# Patient Record
Sex: MALE | Race: WHITE | Employment: OTHER | URBAN - METROPOLITAN AREA
[De-identification: names, ages, dates, MRNs, and addresses within clinical notes are randomized per-mention and may not be internally consistent; named-entity substitution may affect disease eponyms.]

---

## 2017-02-24 DIAGNOSIS — R06.02 SHORTNESS OF BREATH: ICD-10-CM

## 2017-02-24 RX ORDER — FUROSEMIDE 40 MG
40 TABLET ORAL DAILY
Qty: 90 TABLET | Refills: 1 | Status: SHIPPED | OUTPATIENT
Start: 2017-02-24 | End: 2019-09-18

## 2017-03-01 ENCOUNTER — ALLIED HEALTH/NURSE VISIT (OUTPATIENT)
Dept: CARDIOLOGY | Facility: CLINIC | Age: 82
End: 2017-03-01
Payer: COMMERCIAL

## 2017-03-01 DIAGNOSIS — Z95.0 CARDIAC PACEMAKER IN SITU: Primary | ICD-10-CM

## 2017-03-01 PROCEDURE — 93280 PM DEVICE PROGR EVAL DUAL: CPT | Performed by: INTERNAL MEDICINE

## 2017-03-01 NOTE — MR AVS SNAPSHOT
"              After Visit Summary   3/1/2017    Jose Rucker    MRN: [id number]           Patient Information     Date Of Birth          2/4/1931        Visit Information        Provider Department      3/1/2017 10:50 AM RU MIGUELN [facility]        Today's Diagnoses     Cardiac pacemaker in situ    -  1       Follow-ups after your visit        Your next 10 appointments already scheduled     Jun 05, 2017 11:15 AM CDT   Remote PPM Check with NELSON TECH1   [facility] ([facility])    [address] [place] [address] [phone]           This appointment is for a remote check of your pacemaker.  This is not an appointment at the office.              Who to contact     If you have questions or need follow up information about today's clinic visit or your schedule please contact [facility] directly at [phone].  Normal or non-critical lab and imaging results will be communicated to you by Swoopohart, letter or phone within 4 business days after the clinic has received the results. If you do not hear from us within 7 days, please contact the clinic through MyChart or phone. If you have a critical or abnormal lab result, we will notify you by phone as soon as possible.  Submit refill requests through Grafighters or call your pharmacy and they will forward the refill request to us. Please allow 3 business days for your refill to be completed.          Additional Information About Your Visit        Swoopohart Information     Grafighters lets you send messages to your doctor, view your test results, renew your prescriptions, schedule appointments and more. To sign up, go to [email or web address] . Click on \"Log in\" on the left side of the screen, which will take you to the Welcome page. Then click on \"Sign up Now\" on the right side of the page.     You will be asked to enter " the access code listed below, as well as some personal information. Please follow the directions to create your username and password.     Your access code is: [email or web address]  Expires: 2017 11:24 AM     Your access code will  in 90 days. If you need help or a new code, please call your [place] clinic or [phone].        Care EveryWhere ID     This is your Care EveryWhere ID. This could be used by other organizations to access your [place] medical records  [id number]         Blood Pressure from Last 3 Encounters:   16 130/72   02/10/16 135/62   16 120/70    Weight from Last 3 Encounters:   16 83.9 kg (185 lb)   02/10/16 85.8 kg (189 lb 3.2 oz)   16 86.6 kg (191 lb)              We Performed the Following     PM DEVICE PROGRAMMING EVAL, DUAL LEAD PACER (58415)        Primary Care Provider Office Phone # Fax #    Octaviano Simmons [phone]       [facility] [address] [place] [address]        Thank you!     Thank you for choosing [facility] [place]  for your care. Our goal is always to provide you with excellent care. Hearing back from our patients is one way we can continue to improve our services. Please take a few minutes to complete the written survey that you may receive in the mail after your visit with us. Thank you!             Your Updated Medication List - Protect others around you: Learn how to safely use, store and throw away your medicines at www.disposemymeds.org.          This list is accurate as of: 3/1/17 11:24 AM.  Always use your most recent med list.                   Brand Name Dispense Instructions for use    apixaban ANTICOAGULANT 5 MG tablet    ELIQUIS    180 tablet    Take 1 tablet (5 mg) by mouth 2 times daily       furosemide 40 MG tablet    LASIX    90 tablet    Take 1 tablet (40 mg) by mouth daily       lisinopril 10 MG tablet    PRINIVIL/ZESTRIL    90 tablet    Take 1  tablet (10 mg) by mouth daily       metoprolol 50 MG 24 hr tablet    TOPROL-XL    135 tablet    Take 1 tab every am and 1/2 tab every pm (total of 75 mg daily)       PROSTEON PO      Take 500 mg by mouth 2 tablets twice daily       simvastatin 20 MG tablet    ZOCOR     Take 20 mg by mouth At Bedtime       TYLENOL PM EXTRA STRENGTH PO      Take 500 mg by mouth daily

## 2017-03-01 NOTE — PROGRESS NOTES
Medtronic Adapta (D) Pacemaker Device Check  AP: 96 % : 2.4 %  Mode: AAIR-DDDR  bpm        Underlying Rhythm: SB  Heart Rate: Heart rate stable with good variability.  Sensing: WNL    Pacing Threshold: WNL   Impedance: WNL  Battery Status: Approximately 13 years longevity.  Atrial Arrhythmia: Since last reset 2/17/16, there has been 18 mode switches comprising of <0.1 % of the time, stored EGMS showed a PAF with a controlled ventricular rate, longest lasting 1 hour 35 minutes. Patient is on Eliquis.  Ventricular Arrhythmia: 0  Setting Change: 0    Care Plan: Follow up with Q 3 month remote checks. MARY Vieira RN

## 2017-05-01 DIAGNOSIS — N40.0 BENIGN ENLARGEMENT OF PROSTATE: Primary | ICD-10-CM

## 2017-06-05 ENCOUNTER — ALLIED HEALTH/NURSE VISIT (OUTPATIENT)
Dept: CARDIOLOGY | Facility: CLINIC | Age: 82
End: 2017-06-05
Payer: COMMERCIAL

## 2017-06-05 DIAGNOSIS — Z95.0 CARDIAC PACEMAKER IN SITU: Primary | ICD-10-CM

## 2017-06-05 PROCEDURE — 93296 REM INTERROG EVL PM/IDS: CPT | Performed by: INTERNAL MEDICINE

## 2017-06-05 PROCEDURE — 93294 REM INTERROG EVL PM/LDLS PM: CPT | Performed by: INTERNAL MEDICINE

## 2017-06-05 NOTE — MR AVS SNAPSHOT
After Visit Summary   6/5/2017    Jose Rucker    MRN: [id number]           Patient Information     Date Of Birth          2/4/1931        Visit Information        Provider Department      6/5/2017 11:15 AM NELSON TECH1 [facility]        Today's Diagnoses     Cardiac pacemaker in situ    -  1       Follow-ups after your visit        Your next 10 appointments already scheduled     Jun 05, 2017 11:15 AM CDT   Remote PPM Check with NELSON TECH1   [facility] ([facility])    [address]  [facility] [id number] [phone]           This appointment is for a remote check of your pacemaker.  This is not an appointment at the office.            Jun 28, 2017 10:00 AM CDT   Return Visit with Boone Huerta MD   [facility] [place] ([facility])    [address]  [address]  [facility] [address] [phone]            Aug 01, 2017 11:15 AM CDT   Return Visit with Mode Ya MD   [facility] ([facility])    [address]  [facility] [address] [phone]              Who to contact     If you have questions or need follow up information about today's clinic visit or your schedule please contact [facility] directly at [phone].  Normal or non-critical lab and imaging results will be communicated to you by MyChart, letter or phone within 4 business days after the clinic has received the results. If you do not hear from us within 7 days, please contact the clinic through MyChart or phone. If you have a critical or abnormal lab result, we will notify you by phone as soon as possible.  Submit refill requests through [employer] or call your pharmacy and they will forward the refill request to us. Please allow  "3 business days for your refill to be completed.          Additional Information About Your Visit        MyChart Information     Wasabi Productionshart lets you send messages to your doctor, view your test results, renew your prescriptions, schedule appointments and more. To sign up, go to [email or web address] . Click on \"Log in\" on the left side of the screen, which will take you to the Welcome page. Then click on \"Sign up Now\" on the right side of the page.     You will be asked to enter the access code listed below, as well as some personal information. Please follow the directions to create your username and password.     Your access code is: [email or web address]  Expires: 9/3/2017 11:12 AM     Your access code will  in 90 days. If you need help or a new code, please call your [place] clinic or [phone].        Care EveryWhere ID     This is your Care EveryWhere ID. This could be used by other organizations to access your [place] medical records  [id number]         Blood Pressure from Last 3 Encounters:   16 130/72   02/10/16 135/62   16 120/70    Weight from Last 3 Encounters:   16 83.9 kg (185 lb)   02/10/16 85.8 kg (189 lb 3.2 oz)   16 86.6 kg (191 lb)              We Performed the Following     INTERROGATION DEVICE EVAL REMOTE, PACER/ICD (19109)     PM DEVICE INTERROGATE REMOTE (77850)        Primary Care Provider Office Phone # Fax #    Octaviano Simmons [phone]       [facility] [address] [place] [address]        Thank you!     Thank you for choosing [facility] [place]  for your care. Our goal is always to provide you with excellent care. Hearing back from our patients is one way we can continue to improve our services. Please take a few minutes to complete the written survey that you may receive in the mail after your visit with us. Thank you!             Your Updated Medication List - Protect others " around you: Learn how to safely use, store and throw away your medicines at www.disposemymeds.org.          This list is accurate as of: 6/5/17 11:12 AM.  Always use your most recent med list.                   Brand Name Dispense Instructions for use    apixaban ANTICOAGULANT 5 MG tablet    ELIQUIS    180 tablet    Take 1 tablet (5 mg) by mouth 2 times daily       furosemide 40 MG tablet    LASIX    90 tablet    Take 1 tablet (40 mg) by mouth daily       lisinopril 10 MG tablet    PRINIVIL/ZESTRIL    90 tablet    Take 1 tablet (10 mg) by mouth daily       metoprolol 50 MG 24 hr tablet    TOPROL-XL    135 tablet    Take 1 tab every am and 1/2 tab every pm (total of 75 mg daily)       PROSTEON PO      Take 500 mg by mouth 2 tablets twice daily       simvastatin 20 MG tablet    ZOCOR     Take 20 mg by mouth At Bedtime       TYLENOL PM EXTRA STRENGTH PO      Take 500 mg by mouth daily

## 2017-06-23 ENCOUNTER — HOSPITAL ENCOUNTER (OUTPATIENT)
Dept: LAB | Facility: CLINIC | Age: 82
Discharge: HOME | End: 2017-06-23
Attending: UROLOGY | Admitting: UROLOGY
Payer: MEDICARE

## 2017-06-23 DIAGNOSIS — N40.0 BENIGN ENLARGEMENT OF PROSTATE: ICD-10-CM

## 2017-06-23 LAB — PSA SERPL-MCNC: NORMAL UG/L (ref 0–4)

## 2017-06-23 PROCEDURE — 84153 ASSAY OF PSA TOTAL: CPT | Performed by: UROLOGY

## 2017-06-23 PROCEDURE — 36415 COLL VENOUS BLD VENIPUNCTURE: CPT | Performed by: UROLOGY

## 2017-06-28 ENCOUNTER — OFFICE VISIT (OUTPATIENT)
Dept: UROLOGY | Facility: CLINIC | Age: 82
End: 2017-06-28
Payer: COMMERCIAL

## 2017-06-28 VITALS — BODY MASS INDEX: 25.48 KG/M2 | WEIGHT: 178 LBS | HEIGHT: 70 IN | OXYGEN SATURATION: 97 % | HEART RATE: 90 BPM

## 2017-06-28 DIAGNOSIS — Z85.46 PERSONAL HISTORY OF MALIGNANT NEOPLASM OF PROSTATE: Primary | ICD-10-CM

## 2017-06-28 PROCEDURE — 51798 US URINE CAPACITY MEASURE: CPT | Performed by: UROLOGY

## 2017-06-28 PROCEDURE — 99212 OFFICE O/P EST SF 10 MIN: CPT | Mod: 25 | Performed by: UROLOGY

## 2017-06-28 ASSESSMENT — PAIN SCALES - GENERAL: PAINLEVEL: NO PAIN (0)

## 2017-06-28 NOTE — LETTER
6/28/2017       RE: Jose LIDIA [place]  [address] [place] [address]     Dear Colleague,    Thank you for referring your patient, Jose Rucker, [facility]. Please see a copy of my visit note below.    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 Hensley. His last hormone injection was in June 2010. He has done very well since his treatment with an undetectable PSA. istory is a remains undetectable last week. He has no urinary symptoms or complaints      PAST MEDICAL HISTORY:   Past Medical History:   Diagnosis Date     A-fib (H)      Bradycardia     PPM 5/27/2008     Hernia, abdominal      HTN (hypertension)      Palpitations      Sick sinus syndrome (H)      Syncope        PAST SURGICAL HISTORY:   Past Surgical History:   Procedure Laterality Date     CYSTOSCOPY       EP PPM GENERATOR CHANGE DUAL  2/10/16     HERNIA REPAIR       IMPLANT PACEMAKER  5/7/08    Medtronic Sensia, model# SEDR01, serial# [id number]     PROSTATE SURGERY         FAMILY HISTORY:   Family History   Problem Relation Age of Onset     HEART DISEASE Mother        SOCIAL HISTORY:   Social History   Substance Use Topics     Smoking status: Former Smoker     Smokeless tobacco: Never Used      Comment: quit about 60 years ago     Alcohol use Yes      Comment: 2 beers daily       Current Outpatient Prescriptions   Medication     furosemide (LASIX) 40 MG tablet     lisinopril (PRINIVIL/ZESTRIL) 10 MG tablet     apixaban ANTICOAGULANT (ELIQUIS) 5 MG tablet     metoprolol (TOPROL-XL) 50 MG 24 hr tablet     Multiple  "Minerals-Vitamins (PROSTEON PO)     simvastatin (ZOCOR) 20 MG tablet     Diphenhydramine-APAP, sleep, (TYLENOL PM EXTRA STRENGTH PO)     No current facility-administered medications for this visit.          PHYSICAL EXAM:    Pulse 90  Ht 1.778 m (5' 10\")  Wt 80.7 kg (178 lb)  SpO2 97%  BMI 25.54 kg/m2    HEENT: Normocephalic and atraumatic   Cardiac: Not done  Back/Flank: Not done  CNS/PNS: Not done  Respiratory: Normal non-labored breathing  Abdomen: Soft nontender and nondistended  Peripheral Vascular: Not done  Mental Status: Not done    Penis: Not done  Scrotal Skin: Not done  Testicles: Not done  Epididymis: Not done  Digital Rectal Exam:     Cystoscopy: Not done    Imaging: None    Urinalysis: UA RESULTS:  No results for input(s): COLOR, APPEARANCE, URINEGLC, URINEBILI, URINEKETONE, SG, UBLD, URINEPH, PROTEIN, UROBILINOGEN, NITRITE, LEUKEST, RBCU, WBCU in the last 60834 hours.    PSA: undetectable    Post Void Residual: 182 in bladder without voiding prior    Other labs: None today      IMPRESSION:  Doing well, PSA undetectable    PLAN:  I recommended that he continue to have PSA checked annually to make certain it remains undetectable. We will see him back next year.    Total Time: 10 minutes                                      Total in Consultation: 10 minutes  Boone Huerta M.D.            "

## 2017-06-28 NOTE — MR AVS SNAPSHOT
After Visit Summary   6/28/2017    Jose Rucker    MRN: [id number]           Patient Information     Date Of Birth          2/4/1931        Visit Information        Provider Department      6/28/2017 10:00 AM Boone Huerta MD [facility] [place]        Today's Diagnoses     Personal history of malignant neoplasm of prostate    -  1       Follow-ups after your visit        Follow-up notes from your care team     Return in about 1 year (around 6/28/2018) for See PA in 1 year for PSA.      Your next 10 appointments already scheduled     Aug 01, 2017 11:15 AM CDT   Return Visit with Mode Ya MD   [facility] [place] ([facility])    [address]  [facility] [id number] [phone]            Sep 11, 2017  9:45 AM CDT   Remote PPM Check with NELSON TECH1   [facility] [place] ([facility])    [address]  [facility] [id number] [phone]           This appointment is for a remote check of your pacemaker.  This is not an appointment at the office.              Future tests that were ordered for you today     Open Future Orders        Priority Expected Expires Ordered    PSA tumor marker Routine  6/28/2018 6/28/2017            Who to contact     If you have questions or need follow up information about today's clinic visit or your schedule please contact [facility] [place] directly at [phone].  Normal or non-critical lab and imaging results will be communicated to you by MyChart, letter or phone within 4 business days after the clinic has received the results. If you do not hear from us within 7 days, please contact the clinic through MyChart or phone. If you have a critical or abnormal lab result, we will notify you by phone as soon as possible.  Submit refill requests through  "MyChart or call your pharmacy and they will forward the refill request to us. Please allow 3 business days for your refill to be completed.          Additional Information About Your Visit        MyChart Information     Alereonhart lets you send messages to your doctor, view your test results, renew your prescriptions, schedule appointments and more. To sign up, go to [email or web address] . Click on \"Log in\" on the left side of the screen, which will take you to the Welcome page. Then click on \"Sign up Now\" on the right side of the page.     You will be asked to enter the access code listed below, as well as some personal information. Please follow the directions to create your username and password.     Your access code is: [email or web address]  Expires: 9/3/2017 11:12 AM     Your access code will  in 90 days. If you need help or a new code, please call your [place] clinic or [phone].        Care EveryWhere ID     This is your Care EveryWhere ID. This could be used by other organizations to access your [place] medical records  [id number]        Your Vitals Were     Pulse Height Pulse Oximetry BMI (Body Mass Index)          90 1.778 m (5' 10\") 97% 25.54 kg/m2         Blood Pressure from Last 3 Encounters:   16 130/72   02/10/16 135/62   16 120/70    Weight from Last 3 Encounters:   17 80.7 kg (178 lb)   16 83.9 kg (185 lb)   02/10/16 85.8 kg (189 lb 3.2 oz)               Primary Care Provider Office Phone # Fax #    Octaviano Simmons [phone]       [facility] [address] [place] [address]        Equal Access to Services     BRITTNEY QUEEN : Hailee Pope, wakerrie robles, qaybta kaalabdoul rosenthal, paul pérez. So [facility] [phone].    ATENCIÓN: Si habla español, tiene a varela disposición servicios gratuitos de asistencia lingüística. Eve al [phone].    We comply with applicable federal civil " rights laws and [place] laws. We do not discriminate on the basis of race, color, national origin, age, disability sex, sexual orientation or gender identity.            Thank you!     Thank you for choosing [facility] [place]  for your care. Our goal is always to provide you with excellent care. Hearing back from our patients is one way we can continue to improve our services. Please take a few minutes to complete the written survey that you may receive in the mail after your visit with us. Thank you!             Your Updated Medication List - Protect others around you: Learn how to safely use, store and throw away your medicines at www.disposemymeds.org.          This list is accurate as of: 6/28/17 10:19 AM.  Always use your most recent med list.                   Brand Name Dispense Instructions for use Diagnosis    apixaban ANTICOAGULANT 5 MG tablet    ELIQUIS    180 tablet    Take 1 tablet (5 mg) by mouth 2 times daily    A-fib (H)       furosemide 40 MG tablet    LASIX    90 tablet    Take 1 tablet (40 mg) by mouth daily    Shortness of breath       lisinopril 10 MG tablet    PRINIVIL/ZESTRIL    90 tablet    Take 1 tablet (10 mg) by mouth daily    Essential hypertension, benign       metoprolol 50 MG 24 hr tablet    TOPROL-XL    135 tablet    Take 1 tab every am and 1/2 tab every pm (total of 75 mg daily)    Paroxysmal atrial fibrillation (H)       PROSTEON PO      Take 500 mg by mouth 2 tablets twice daily        simvastatin 20 MG tablet    ZOCOR     Take 20 mg by mouth At Bedtime        TYLENOL PM EXTRA STRENGTH PO      Take 500 mg by mouth daily

## 2017-06-28 NOTE — PROGRESS NOTES
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 Hensley. His last hormone injection was in June 2010. He has done very well since his treatment with an undetectable PSA. istory is a remains undetectable last week. He has no urinary symptoms or complaints      PAST MEDICAL HISTORY:   Past Medical History:   Diagnosis Date     A-fib (H)      Bradycardia     PPM 5/27/2008     Hernia, abdominal      HTN (hypertension)      Palpitations      Sick sinus syndrome (H)      Syncope        PAST SURGICAL HISTORY:   Past Surgical History:   Procedure Laterality Date     CYSTOSCOPY       EP PPM GENERATOR CHANGE DUAL  2/10/16     HERNIA REPAIR       IMPLANT PACEMAKER  5/7/08    Medtronic Sensia, model# SEDR01, serial# [id number]     PROSTATE SURGERY         FAMILY HISTORY:   Family History   Problem Relation Age of Onset     HEART DISEASE Mother        SOCIAL HISTORY:   Social History   Substance Use Topics     Smoking status: Former Smoker     Smokeless tobacco: Never Used      Comment: quit about 60 years ago     Alcohol use Yes      Comment: 2 beers daily       Current Outpatient Prescriptions   Medication     furosemide (LASIX) 40 MG tablet     lisinopril (PRINIVIL/ZESTRIL) 10 MG tablet     apixaban ANTICOAGULANT (ELIQUIS) 5 MG tablet     metoprolol (TOPROL-XL) 50 MG 24 hr tablet     Multiple Minerals-Vitamins (PROSTEON PO)     simvastatin (ZOCOR) 20 MG tablet     Diphenhydramine-APAP, sleep, (TYLENOL PM EXTRA STRENGTH PO)     No current facility-administered medications for this visit.          PHYSICAL EXAM:    Pulse 90  Ht 1.778 m (5' 10\")  Wt 80.7 kg (178 lb)  SpO2 97%  BMI 25.54 kg/m2    HEENT: Normocephalic and " atraumatic   Cardiac: Not done  Back/Flank: Not done  CNS/PNS: Not done  Respiratory: Normal non-labored breathing  Abdomen: Soft nontender and nondistended  Peripheral Vascular: Not done  Mental Status: Not done    Penis: Not done  Scrotal Skin: Not done  Testicles: Not done  Epididymis: Not done  Digital Rectal Exam:     Cystoscopy: Not done    Imaging: None    Urinalysis: UA RESULTS:  No results for input(s): COLOR, APPEARANCE, URINEGLC, URINEBILI, URINEKETONE, SG, UBLD, URINEPH, PROTEIN, UROBILINOGEN, NITRITE, LEUKEST, RBCU, WBCU in the last 53552 hours.    PSA: undetectable    Post Void Residual: 182 in bladder without voiding prior    Other labs: None today      IMPRESSION:  Doing well, PSA undetectable    PLAN:  I recommended that he continue to have PSA checked annually to make certain it remains undetectable. We will see him back next year.    Total Time: 10 minutes                                      Total in Consultation: 10 minutes      Boone Huerta M.D.

## 2017-08-01 ENCOUNTER — OFFICE VISIT (OUTPATIENT)
Dept: CARDIOLOGY | Facility: CLINIC | Age: 82
End: 2017-08-01
Attending: INTERNAL MEDICINE
Payer: COMMERCIAL

## 2017-08-01 VITALS
HEIGHT: 70 IN | WEIGHT: 182 LBS | DIASTOLIC BLOOD PRESSURE: 70 MMHG | HEART RATE: 66 BPM | OXYGEN SATURATION: 95 % | BODY MASS INDEX: 26.05 KG/M2 | SYSTOLIC BLOOD PRESSURE: 132 MMHG

## 2017-08-01 DIAGNOSIS — I42.9 CARDIOMYOPATHY, UNSPECIFIED (H): ICD-10-CM

## 2017-08-01 DIAGNOSIS — Z95.0 CARDIAC PACEMAKER IN SITU: Primary | ICD-10-CM

## 2017-08-01 DIAGNOSIS — I48.0 PAROXYSMAL ATRIAL FIBRILLATION (H): ICD-10-CM

## 2017-08-01 DIAGNOSIS — I50.22 CHRONIC SYSTOLIC CONGESTIVE HEART FAILURE (H): ICD-10-CM

## 2017-08-01 DIAGNOSIS — I49.5 SSS (SICK SINUS SYNDROME) (H): ICD-10-CM

## 2017-08-01 PROCEDURE — 99214 OFFICE O/P EST MOD 30 MIN: CPT | Performed by: INTERNAL MEDICINE

## 2017-08-01 NOTE — PROGRESS NOTES
HPI and Plan:   See dictation    Orders Placed This Encounter   Procedures     Follow-Up with Cardiologist     EKG 12-lead complete w/read - Clinics (to be scheduled)     Echocardiogram       No orders of the defined types were placed in this encounter.      There are no discontinued medications.      Encounter Diagnoses   Name Primary?     SSS (sick sinus syndrome) (H)      Cardiac pacemaker in situ Yes     Paroxysmal atrial fibrillation (H)      Chronic systolic congestive heart failure (H)      Cardiomyopathy, unspecified (H)        CURRENT MEDICATIONS:  Current Outpatient Prescriptions   Medication Sig Dispense Refill     furosemide (LASIX) 40 MG tablet Take 1 tablet (40 mg) by mouth daily 90 tablet 1     lisinopril (PRINIVIL/ZESTRIL) 10 MG tablet Take 1 tablet (10 mg) by mouth daily 90 tablet 2     apixaban ANTICOAGULANT (ELIQUIS) 5 MG tablet Take 1 tablet (5 mg) by mouth 2 times daily 180 tablet 3     metoprolol (TOPROL-XL) 50 MG 24 hr tablet Take 1 tab every am and 1/2 tab every pm (total of 75 mg daily) 135 tablet 3     Multiple Minerals-Vitamins (PROSTEON PO) Take 500 mg by mouth 2 tablets twice daily       simvastatin (ZOCOR) 20 MG tablet Take 20 mg by mouth At Bedtime       Diphenhydramine-APAP, sleep, (TYLENOL PM EXTRA STRENGTH PO) Take 500 mg by mouth daily         ALLERGIES     Allergies   Allergen Reactions     Penicillin G Rash     And patient had an awful smell        PAST MEDICAL HISTORY:  Past Medical History:   Diagnosis Date     A-fib (H)      Bradycardia     PPM 5/27/2008     Hernia, abdominal      HTN (hypertension)      Palpitations      Sick sinus syndrome (H)      Syncope        PAST SURGICAL HISTORY:  Past Surgical History:   Procedure Laterality Date     CYSTOSCOPY       EP PPM GENERATOR CHANGE DUAL  2/10/16     HERNIA REPAIR       IMPLANT PACEMAKER  5/7/08    Medtronic Sensia, model# SEDR01, serial# [id number]     PROSTATE SURGERY         FAMILY HISTORY:  Family History   Problem  "Relation Age of Onset     HEART DISEASE Mother        SOCIAL HISTORY:  Social History     Social History     Marital status:      Spouse name: N/A     Number of children: N/A     Years of education: N/A     Social History Main Topics     Smoking status: Former Smoker     Smokeless tobacco: Never Used      Comment: quit about 60 years ago     Alcohol use Yes      Comment: 2 beers daily     Drug use: None     Sexual activity: Not Asked     Other Topics Concern     Caffeine Concern No     2 cups daily     Special Diet No     Exercise No     some stairs, yardwork, uses exercise ball     Social History Narrative       Review of Systems:  Skin:  Positive for   precancer on skin seeing derm    Eyes:  Positive for glasses    ENT:  Positive for postnasal drainage;hearing loss    Respiratory:  Positive for wheezing once in a while more so when laying head on a pillow    Cardiovascular:  Negative      Gastroenterology: Negative      Genitourinary:  Positive for urinary frequency hx of prostate cancer  Musculoskeletal:  Positive for arthritis;neck pain    Neurologic:  Positive for numbness or tingling of hands;numbness or tingling of feet hands and feet   Psychiatric:  Negative      Heme/Lymph/Imm:  Positive for easy bruising    Endocrine:  Negative        Physical Exam:  Vitals: /70 (BP Location: Right arm, Patient Position: Sitting, Cuff Size: Adult Regular)  Pulse 66  Ht 1.778 m (5' 10\")  Wt 82.6 kg (182 lb)  SpO2 95%  BMI 26.11 kg/m2    Constitutional:  cooperative, alert and oriented, well developed, well nourished, in no acute distress;cooperative overweight      Skin:  warm and dry to the touch, no apparent skin lesions or masses noted        Head:  normocephalic, no masses or lesions        Eyes:  pupils equal and round, conjunctivae and lids unremarkable, sclera white, no xanthalasma, EOMS intact, no nystagmus        ENT:  no pallor or cyanosis, dentition good        Neck:  carotid pulses are full and " equal bilaterally, JVP normal, no carotid bruit, no thyromegaly        Chest:  normal breath sounds, clear to auscultation, normal A-P diameter, normal symmetry, normal respiratory excursion, no use of accessory muscles  (Occassional coarse crackles at right base.) pacemaker incision in the left infraclavicular area was well-healed      Cardiac: regular rhythm, normal S1/S2, no S3 or S4, apical impulse not displaced, no murmurs, gallops or rubs                  Abdomen:  abdomen soft, non-tender, BS normoactive, no mass, no HSM, no bruits        Vascular: pulses full and equal, no bruits auscultated;pulses full and equal                                        Extremities and Back:  no deformities, clubbing, cyanosis, erythema observed;no edema stasis pigmentation            Neurological:  affect appropriate, oriented to time, person and place;no gross motor deficits              CC  Saida Rivas MD   [facility]  [address]  [place] [id number]

## 2017-08-01 NOTE — LETTER
2017    Octaviano Simmons MD  [facility]   [address] [place] [address]    RE: Jose Rucker       Dear Colleague,    I had the pleasure of seeing Jose Rucker in the [facility].    2017           Octaviano Simmons MD   [facility] [address] [place] [address]      RE: Jose Rucker   MRN: [id number]   : 1931           Dear Dr. Simmons:      It is my pleasure to see your patient Jose Rucker, who is a nice, 84-year-old gentleman previously seen by my partner, Dr. Radames Jewell, for a mild cardiomyopathy when his ejection fraction was felt to be in the 40%-45%.  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  The patient also has a history of sick sinus syndrome and paroxysmal atrial fibrillation.  The patient has a dual-chamber permanent pacemaker in situ for sick sinus syndrome.  The patient was noted to have short episodes of atrial fibrillation with a low AF burden.  He is anticoagulated with eliquis, and he is on metoprolol 50 mg once in the morning and 1/2 tablet at nighttime (total of 75 mg per day).   If you remember when I saw him in  he was complaining of shortness of breath. However, the EKG showed that his permanent pacemaker was at end-of-life mode. This means that the pacemaker is in a fixed heart rate and paces only in the ventricles with no rate responsiveness. So one loses the atrial involvement in the cardiac cycle and the ventricular rate does not increase with exercise. He had his generator replaced by Dr. Rivas and he symptoms went away. He still feels very well with no shortness of breath and is able to carry out his activities of daily living without limitation. Interrogation of these permanent pacemaker indicated that he had 8 V switches the lungs episode was only 1 minute and  7 seconds ~ventricular rate was on the higher side with that mode switch. Typically, he's ventricular rate has been well-controlled. He is asymptomatic when he's in atrial fibrillation. He is anticoagulated with eliquis and has no bleeding issues. In the past he has a history of cardiomyopathy. His most recent echocardiogram was a year ago showing that he had normal ejection fraction in the 55 to 60% range.       IMPRESSION:     1.  Atrial fibrillation. He's atrial fibrillation burden is low. The episodes are short-lived and he is asymptomatic. He is having no bleeding issues with his anticoagulation. 2. Permanent pacemaker. Since the replacement of his generator he symptoms of shortness of breath have disappeared for the reasons I mentioned above.  3. Cardiomyopathy. He's ejection fraction a year ago was normal. He does not of symptoms of congestive heart failure.       PLAN:   I will continue the patient on his present medications. I will have to follow-up with me in one years time and at that time we would repeat his echocardiogram to follow the cardiomyopathy.     It has been a pleasure to be involved in the care of this extremely nice patient. As always, he has been told to contact us if he has any questions or any concerns.     Sincerely,      Mode Ya MD, [facility]                         Name:     IDANIA BEDOLLA   MRN:      [id number]        Account:      [id number]   :      1931           Service Date: 2017         HPI and Plan:   See dictation    Orders Placed This Encounter   Procedures     Follow-Up with Cardiologist     EKG 12-lead complete w/read - Clinics (to be scheduled)     Echocardiogram       No orders of the defined types were placed in this encounter.      There are no discontinued medications.      Encounter Diagnoses   Name Primary?     SSS (sick sinus syndrome) (H)      Cardiac pacemaker in situ Yes     Paroxysmal atrial fibrillation (H)      Chronic systolic  congestive heart failure (H)      Cardiomyopathy, unspecified (H)        CURRENT MEDICATIONS:  Current Outpatient Prescriptions   Medication Sig Dispense Refill     furosemide (LASIX) 40 MG tablet Take 1 tablet (40 mg) by mouth daily 90 tablet 1     lisinopril (PRINIVIL/ZESTRIL) 10 MG tablet Take 1 tablet (10 mg) by mouth daily 90 tablet 2     apixaban ANTICOAGULANT (ELIQUIS) 5 MG tablet Take 1 tablet (5 mg) by mouth 2 times daily 180 tablet 3     metoprolol (TOPROL-XL) 50 MG 24 hr tablet Take 1 tab every am and 1/2 tab every pm (total of 75 mg daily) 135 tablet 3     Multiple Minerals-Vitamins (PROSTEON PO) Take 500 mg by mouth 2 tablets twice daily       simvastatin (ZOCOR) 20 MG tablet Take 20 mg by mouth At Bedtime       Diphenhydramine-APAP, sleep, (TYLENOL PM EXTRA STRENGTH PO) Take 500 mg by mouth daily         ALLERGIES     Allergies   Allergen Reactions     Penicillin G Rash     And patient had an awful smell        PAST MEDICAL HISTORY:  Past Medical History:   Diagnosis Date     A-fib (H)      Bradycardia     PPM 5/27/2008     Hernia, abdominal      HTN (hypertension)      Palpitations      Sick sinus syndrome (H)      Syncope        PAST SURGICAL HISTORY:  Past Surgical History:   Procedure Laterality Date     CYSTOSCOPY       EP PPM GENERATOR CHANGE DUAL  2/10/16     HERNIA REPAIR       IMPLANT PACEMAKER  5/7/08    Medtronic Sensia, model# SEDR01, serial# [id number]     PROSTATE SURGERY         FAMILY HISTORY:  Family History   Problem Relation Age of Onset     HEART DISEASE Mother        SOCIAL HISTORY:  Social History     Social History     Marital status:      Spouse name: N/A     Number of children: N/A     Years of education: N/A     Social History Main Topics     Smoking status: Former Smoker     Smokeless tobacco: Never Used      Comment: quit about 60 years ago     Alcohol use Yes      Comment: 2 beers daily     Drug use: None     Sexual activity: Not Asked     Other Topics Concern      "Caffeine Concern No     2 cups daily     Special Diet No     Exercise No     some stairs, yardwork, uses exercise ball     Social History Narrative       Review of Systems:  Skin:  Positive for   precancer on skin seeing derm    Eyes:  Positive for glasses    ENT:  Positive for postnasal drainage;hearing loss    Respiratory:  Positive for wheezing once in a while more so when laying head on a pillow    Cardiovascular:  Negative      Gastroenterology: Negative      Genitourinary:  Positive for urinary frequency hx of prostate cancer  Musculoskeletal:  Positive for arthritis;neck pain    Neurologic:  Positive for numbness or tingling of hands;numbness or tingling of feet hands and feet   Psychiatric:  Negative      Heme/Lymph/Imm:  Positive for easy bruising    Endocrine:  Negative        Physical Exam:  Vitals: /70 (BP Location: Right arm, Patient Position: Sitting, Cuff Size: Adult Regular)  Pulse 66  Ht 1.778 m (5' 10\")  Wt 82.6 kg (182 lb)  SpO2 95%  BMI 26.11 kg/m2    Constitutional:  cooperative, alert and oriented, well developed, well nourished, in no acute distress;cooperative overweight      Skin:  warm and dry to the touch, no apparent skin lesions or masses noted        Head:  normocephalic, no masses or lesions        Eyes:  pupils equal and round, conjunctivae and lids unremarkable, sclera white, no xanthalasma, EOMS intact, no nystagmus        ENT:  no pallor or cyanosis, dentition good        Neck:  carotid pulses are full and equal bilaterally, JVP normal, no carotid bruit, no thyromegaly        Chest:  normal breath sounds, clear to auscultation, normal A-P diameter, normal symmetry, normal respiratory excursion, no use of accessory muscles  (Occassional coarse crackles at right base.) pacemaker incision in the left infraclavicular area was well-healed      Cardiac: regular rhythm, normal S1/S2, no S3 or S4, apical impulse not displaced, no murmurs, gallops or rubs              "     Abdomen:  abdomen soft, non-tender, BS normoactive, no mass, no HSM, no bruits        Vascular: pulses full and equal, no bruits auscultated;pulses full and equal                                        Extremities and Back:  no deformities, clubbing, cyanosis, erythema observed;no edema stasis pigmentation            Neurological:  affect appropriate, oriented to time, person and place;no gross motor deficits              Thank you for allowing me to participate in the care of your patient.    Sincerely,     Mode Ya MD    [facility]

## 2017-08-01 NOTE — MR AVS SNAPSHOT
After Visit Summary   8/1/2017    Jose Rucker    MRN: [id number]           Patient Information     Date Of Birth          2/4/1931        Visit Information        Provider Department      8/1/2017 11:15 AM Mode Ya MD [facility]        Today's Diagnoses     Cardiac pacemaker in situ    -  1    SSS (sick sinus syndrome) (H)        Paroxysmal atrial fibrillation (H)        Chronic systolic congestive heart failure (H)        Cardiomyopathy, unspecified (H)           Follow-ups after your visit        Additional Services     Follow-Up with Cardiologist                 Your next 10 appointments already scheduled     Sep 11, 2017  9:45 AM CDT   Remote PPM Check with NELSON TECH1   [facility] ([facility])    [facility] [id number]  [facility] [place] [id number] [phone]           This appointment is for a remote check of your pacemaker.  This is not an appointment at the office.              Future tests that were ordered for you today     Open Future Orders        Priority Expected Expires Ordered    EKG 12-lead complete w/read - Clinics (to be scheduled) Routine 8/1/2018 8/2/2018 8/1/2017    Echocardiogram Routine 8/1/2018 8/2/2018 8/1/2017    Follow-Up with Cardiologist Routine 8/1/2018 8/2/2018 8/1/2017            Who to contact     If you have questions or need follow up information about today's clinic visit or your schedule please contact [facility] directly at [phone].  Normal or non-critical lab and imaging results will be communicated to you by MyChart, letter or phone within 4 business days after the clinic has received the results. If you do not hear from us within 7 days, please contact the clinic through MyChart or phone. If you have a critical or abnormal lab result, we will notify you by phone as soon as possible.  Submit refill  "requests through eCo[employer] or call your pharmacy and they will forward the refill request to us. Please allow 3 business days for your refill to be completed.          Additional Information About Your Visit        [employer] Information     e[employer]zy lets you send messages to your doctor, view your test results, renew your prescriptions, schedule appointments and more. To sign up, go to [email or web address] . Click on \"Log in\" on the left side of the screen, which will take you to the Welcome page. Then click on \"Sign up Now\" on the right side of the page.     You will be asked to enter the access code listed below, as well as some personal information. Please follow the directions to create your username and password.     Your access code is: [email or web address]  Expires: 9/3/2017 11:12 AM     Your access code will  in 90 days. If you need help or a new code, please call your [facility] [place] clinic or [phone].        Care EveryWhere ID     This is your Care EveryWhere ID. This could be used by other organizations to access your [facility] [place] medical records  [id number]        Your Vitals Were     Pulse Height Pulse Oximetry BMI (Body Mass Index)          66 1.778 m (5' 10\") 95% 26.11 kg/m2         Blood Pressure from Last 3 Encounters:   17 132/70   16 130/72   02/10/16 135/62    Weight from Last 3 Encounters:   17 82.6 kg (182 lb)   17 80.7 kg (178 lb)   16 83.9 kg (185 lb)              We Performed the Following     Follow-Up with Cardiologist        Primary Care Provider Office Phone # Fax #    Octaviano Simmons [phone]       [facility] [address] [place] [address]        Equal Access to Services     BAUTISTA QUEEN : Hailee Pope, victor manuel robles, qagómez kaalabdoul rosenthal, paul pérez. [facility] [phone].    ATENCIÓN: Si habla español, tiene a varela disposición servicios gratuitos de asistencia " lingüística. Eve al [phone].    We comply with applicable federal civil rights laws and [place] laws. We do not discriminate on the basis of race, color, national origin, age, disability sex, sexual orientation or gender identity.            Thank you!     Thank you for choosing [facility] [place]  for your care. Our goal is always to provide you with excellent care. Hearing back from our patients is one way we can continue to improve our services. Please take a few minutes to complete the written survey that you may receive in the mail after your visit with us. Thank you!             Your Updated Medication List - Protect others around you: Learn how to safely use, store and throw away your medicines at www.disposemymeds.org.          This list is accurate as of: 8/1/17 11:39 AM.  Always use your most recent med list.                   Brand Name Dispense Instructions for use Diagnosis    apixaban ANTICOAGULANT 5 MG tablet    ELIQUIS    180 tablet    Take 1 tablet (5 mg) by mouth 2 times daily    A-fib (H)       furosemide 40 MG tablet    LASIX    90 tablet    Take 1 tablet (40 mg) by mouth daily    Shortness of breath       lisinopril 10 MG tablet    PRINIVIL/ZESTRIL    90 tablet    Take 1 tablet (10 mg) by mouth daily    Essential hypertension, benign       metoprolol 50 MG 24 hr tablet    TOPROL-XL    135 tablet    Take 1 tab every am and 1/2 tab every pm (total of 75 mg daily)    Paroxysmal atrial fibrillation (H)       PROSTEON PO      Take 500 mg by mouth 2 tablets twice daily        simvastatin 20 MG tablet    ZOCOR     Take 20 mg by mouth At Bedtime        TYLENOL PM EXTRA STRENGTH PO      Take 500 mg by mouth daily

## 2017-08-01 NOTE — PROGRESS NOTES
2017           Octaviano Simmons MD   [facility]    [address] [place] [address]      RE: Jose Rucker   MRN: [id number]   : 1931           Dear Dr. Simmons:      It is my pleasure to see your patient Jose Rucker, who is a nice, 84-year-old gentleman previously seen by my partner, Dr. Radames Jewell, for a mild cardiomyopathy when his ejection fraction was felt to be in the 40%-45%.  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  The patient also has a history of sick sinus syndrome and paroxysmal atrial fibrillation.  The patient has a dual-chamber permanent pacemaker in situ for sick sinus syndrome.  The patient was noted to have short episodes of atrial fibrillation with a low AF burden.  He is anticoagulated with eliquis, and he is on metoprolol 50 mg once in the morning and 1/2 tablet at nighttime (total of 75 mg per day).   If you remember when I saw him in  he was complaining of shortness of breath. However, the EKG showed that his permanent pacemaker was at end-of-life mode. This means that the pacemaker is in a fixed heart rate and paces only in the ventricles with no rate responsiveness. So one loses the atrial involvement in the cardiac cycle and the ventricular rate does not increase with exercise. He had his generator replaced by Dr. Rivas and he symptoms went away. He still feels very well with no shortness of breath and is able to carry out his activities of daily living without limitation. Interrogation of these permanent pacemaker indicated that he had 8 V switches the lungs episode was only 1 minute and 7 seconds ~ventricular rate was on the higher side with that mode switch. Typically, he's ventricular rate has been well-controlled. He is asymptomatic when he's in atrial fibrillation. He is anticoagulated with eliquis and has no bleeding issues. In the  past he has a history of cardiomyopathy. His most recent echocardiogram was a year ago showing that he had normal ejection fraction in the 55 to 60% range.       IMPRESSION:     1.  Atrial fibrillation. He's atrial fibrillation burden is low. The episodes are short-lived and he is asymptomatic. He is having no bleeding issues with his anticoagulation. 2. Permanent pacemaker. Since the replacement of his generator he symptoms of shortness of breath have disappeared for the reasons I mentioned above.  3. Cardiomyopathy. He's ejection fraction a year ago was normal. He does not of symptoms of congestive heart failure.       PLAN:   I will continue the patient on his present medications. I will have to follow-up with me in one years time and at that time we would repeat his echocardiogram to follow the cardiomyopathy.     It has been a pleasure to be involved in the care of this extremely nice patient. As always, he has been told to contact us if he has any questions or any concerns.     Sincerely,      Mode Ya MD, [facility]                         Name:     IDANIA BEDOLLA   MRN:      [id number]        Account:      [id number]   :      1931           Service Date: 2017

## 2017-09-11 ENCOUNTER — ALLIED HEALTH/NURSE VISIT (OUTPATIENT)
Dept: CARDIOLOGY | Facility: CLINIC | Age: 82
End: 2017-09-11
Payer: COMMERCIAL

## 2017-09-11 DIAGNOSIS — Z95.0 CARDIAC PACEMAKER IN SITU: Primary | ICD-10-CM

## 2017-09-11 PROCEDURE — 93294 REM INTERROG EVL PM/LDLS PM: CPT | Performed by: INTERNAL MEDICINE

## 2017-09-11 PROCEDURE — 93296 REM INTERROG EVL PM/IDS: CPT | Performed by: INTERNAL MEDICINE

## 2017-09-11 NOTE — MR AVS SNAPSHOT
"              After Visit Summary   2017    Jose Rucker    MRN: [id number]           Patient Information     Date Of Birth          1931        Visit Information        Provider Department      2017 9:45 AM NELSON TECH1 [facility] HEART AT [place]        Today's Diagnoses     Cardiac pacemaker in situ    -  1       Follow-ups after your visit        Who to contact     If you have questions or need follow up information about today's clinic visit or your schedule please contact [facility] directly at [phone].  Normal or non-critical lab and imaging results will be communicated to you by ZALPhart, letter or phone within 4 business days after the clinic has received the results. If you do not hear from us within 7 days, please contact the clinic through "[employer]t or phone. If you have a critical or abnormal lab result, we will notify you by phone as soon as possible.  Submit refill requests through [email or web address] or call your pharmacy and they will forward the refill request to us. Please allow 3 business days for your refill to be completed.          Additional Information About Your Visit        MyChart Information     [email or web address] lets you send messages to your doctor, view your test results, renew your prescriptions, schedule appointments and more. To sign up, go to [email or web address] . Click on \"Log in\" on the left side of the screen, which will take you to the Welcome page. Then click on \"Sign up Now\" on the right side of the page.     You will be asked to enter the access code listed below, as well as some personal information. Please follow the directions to create your username and password.     Your access code is: [email or web address]  Expires: 12/10/2017 10:13 AM     Your access code will  in 90 days. If you need help or a new code, please call your [place] clinic or [phone].        Care EveryWhere ID     This is your Care " EveryWhere ID. This could be used by other organizations to access your [place] medical records  [id number]         Blood Pressure from Last 3 Encounters:   08/01/17 132/70   06/22/16 130/72   02/10/16 135/62    Weight from Last 3 Encounters:   08/01/17 82.6 kg (182 lb)   06/28/17 80.7 kg (178 lb)   06/22/16 83.9 kg (185 lb)              We Performed the Following     INTERROGATION DEVICE EVAL REMOTE, PACER/ICD (67576)     PM DEVICE INTERROGATE REMOTE (33194)        Primary Care Provider Office Phone # Fax #    Octaviano Simmons [phone]       [facility] [address] [place] [address]        Equal Access to Services     BAUTISTA QUEEN : Hadii aad ku hadasho Soomaali, waaxda luqadaha, qaybta kaalmada adeegyada, waxay dakotain haycammie estrada . So [facility] [phone].    ATENCIÓN: Si habla español, tiene a varela disposición servicios gratuitos de asistencia lingüística. Eve al [phone].    We comply with applicable federal civil rights laws and [place] laws. We do not discriminate on the basis of race, color, national origin, age, disability sex, sexual orientation or gender identity.            Thank you!     Thank you for choosing [facility] [place]  for your care. Our goal is always to provide you with excellent care. Hearing back from our patients is one way we can continue to improve our services. Please take a few minutes to complete the written survey that you may receive in the mail after your visit with us. Thank you!             Your Updated Medication List - Protect others around you: Learn how to safely use, store and throw away your medicines at www.disposemymeds.org.          This list is accurate as of: 9/11/17 10:13 AM.  Always use your most recent med list.                   Brand Name Dispense Instructions for use Diagnosis    apixaban ANTICOAGULANT 5 MG tablet    ELIQUIS    180 tablet    Take 1 tablet (5  mg) by mouth 2 times daily    A-fib (H)       furosemide 40 MG tablet    LASIX    90 tablet    Take 1 tablet (40 mg) by mouth daily    Shortness of breath       lisinopril 10 MG tablet    PRINIVIL/ZESTRIL    90 tablet    Take 1 tablet (10 mg) by mouth daily    Essential hypertension, benign       metoprolol 50 MG 24 hr tablet    TOPROL-XL    135 tablet    Take 1 tab every am and 1/2 tab every pm (total of 75 mg daily)    Paroxysmal atrial fibrillation (H)       PROSTEON PO      Take 500 mg by mouth 2 tablets twice daily        simvastatin 20 MG tablet    ZOCOR     Take 20 mg by mouth At Bedtime        TYLENOL PM EXTRA STRENGTH PO      Take 500 mg by mouth daily

## 2017-09-11 NOTE — PROGRESS NOTES
Medtronic Adapta (D) Remote PPM Device Check  AP: 99 % : 3 %  Mode: AAIR<->DDDR        Presenting Rhythm: AP/VS  Heart Rate: Adequate rates per histogram  Sensing: Stable    Pacing Threshold: Stable    Impedance: Stable  Battery Status: 10.5-14 years  Atrial Arrhythmia: 142 mode switch episodes comprising 0.6% of the time. Longest episode lasted 11 hours 47 minutes. Ventricular rates 60-160bpm. Taking Eliquis.       Ventricular Arrhythmia: None     Care Plan: F/u PPM Carelink q 3 months. LM with results. DEA QuinnT

## 2017-12-18 ENCOUNTER — ALLIED HEALTH/NURSE VISIT (OUTPATIENT)
Dept: CARDIOLOGY | Facility: CLINIC | Age: 82
End: 2017-12-18
Payer: COMMERCIAL

## 2017-12-18 DIAGNOSIS — Z95.0 CARDIAC PACEMAKER IN SITU: Primary | ICD-10-CM

## 2017-12-18 PROCEDURE — 93294 REM INTERROG EVL PM/LDLS PM: CPT | Performed by: INTERNAL MEDICINE

## 2017-12-18 PROCEDURE — 93296 REM INTERROG EVL PM/IDS: CPT | Performed by: INTERNAL MEDICINE

## 2017-12-18 NOTE — PROGRESS NOTES
Medtronic Adapta (D) Remote PPM Device Check  AP: 99 % : 2 %  Mode: AAIR<->DDDR        Presenting Rhythm: AP/VS  Heart Rate: Adequate rates per histogram  Sensing: Stable    Pacing Threshold: Stable    Impedance: Stable  Battery Status: 11-14.5 years  Atrial Arrhythmia: 2 brief mode switch episodes, no EGMs.   Ventricular Arrhythmia: None     Care Plan: F/u annual threshold in 3 months. LM with results. [place],CVT

## 2017-12-18 NOTE — MR AVS SNAPSHOT
After Visit Summary   12/18/2017    Jose Rucker    MRN: [id number]           Patient Information     Date Of Birth          2/4/1931        Visit Information        Provider Department      12/18/2017 1:30 PM [facility]IE HANNA [facility]        Today's Diagnoses     Cardiac pacemaker in situ    -  1       Follow-ups after your visit        Additional Services     Follow-Up with Device Clinic                 Your next 10 appointments already scheduled     Dec 18, 2017  1:30 PM CST   Remote PPM Check with LESLIE HANNA   [facility]   [place] ([facility])    [facility]  [facility] [id number] [phone]           This appointment is for a remote check of your pacemaker.  This is not an appointment at the office.              Future tests that were ordered for you today     Open Future Orders        Priority Expected Expires Ordered    Follow-Up with Device Clinic Routine 3/18/2018 12/18/2018 12/18/2017            Who to contact     If you have questions or need follow up information about today's clinic visit or your schedule please contact [facility] directly at [phone].  Normal or non-critical lab and imaging results will be communicated to you by Avenue Righthart, letter or phone within 4 business days after the clinic has received the results. If you do not hear from us within 7 days, please contact the clinic through [employer] or phone. If you have a critical or abnormal lab result, we will notify you by phone as soon as possible.  Submit refill requests through fundfindr or call your pharmacy and they will forward the refill request to us. Please allow 3 business days for your refill to be completed.          Additional Information About Your Visit        fundfindr Information     fundfindr lets you send messages to your doctor, view your test results, renew your prescriptions,  "schedule appointments and more. To sign up, go to [email or web address] . Click on \"Log in\" on the left side of the screen, which will take you to the Welcome page. Then click on \"Sign up Now\" on the right side of the page.     You will be asked to enter the access code listed below, as well as some personal information. Please follow the directions to create your username and password.     Your access code is: [email or web address]  Expires: 3/18/2018 10:41 AM     Your access code will  in 90 days. If you need help or a new code, please call your [place] clinic or [phone].        Care EveryWhere ID     This is your Care EveryWhere ID. This could be used by other organizations to access your [place] medical records  [id number]         Blood Pressure from Last 3 Encounters:   17 132/70   16 130/72   02/10/16 135/62    Weight from Last 3 Encounters:   17 82.6 kg (182 lb)   17 80.7 kg (178 lb)   16 83.9 kg (185 lb)              We Performed the Following     INTERROGATION DEVICE EVAL REMOTE, PACER/ICD (69255)     PM DEVICE INTERROGATE REMOTE (42222)        Primary Care Provider Office Phone # Fax #    Octaviano Simmons [phone]       [facility] [address] [place] [address]        Equal Access to Services     BAUTISTA QUEEN AH: Hadii aad ku hadasho Soomaali, waaxda luqadaha, qaybta kaalmada adeegyada, waxay christie pérez. So [facility] [phone].    ATENCIÓN: Si habla español, tiene a varela disposición servicios gratuitos de asistencia lingüística. Llame al [phone].    We comply with applicable federal civil rights laws and [place] laws. We do not discriminate on the basis of race, color, national origin, age, disability, sex, sexual orientation, or gender identity.            Thank you!     Thank you for choosing [facility]  for your care. Our goal is always to provide you with " excellent care. Hearing back from our patients is one way we can continue to improve our services. Please take a few minutes to complete the written survey that you may receive in the mail after your visit with us. Thank you!             Your Updated Medication List - Protect others around you: Learn how to safely use, store and throw away your medicines at www.disposemymeds.org.          This list is accurate as of: 12/18/17 10:41 AM.  Always use your most recent med list.                   Brand Name Dispense Instructions for use Diagnosis    apixaban ANTICOAGULANT 5 MG tablet    ELIQUIS    180 tablet    Take 1 tablet (5 mg) by mouth 2 times daily    A-fib (H)       furosemide 40 MG tablet    LASIX    90 tablet    Take 1 tablet (40 mg) by mouth daily    Shortness of breath       lisinopril 10 MG tablet    PRINIVIL/ZESTRIL    90 tablet    Take 1 tablet (10 mg) by mouth daily    Essential hypertension, benign       metoprolol 50 MG 24 hr tablet    TOPROL-XL    135 tablet    Take 1 tab every am and 1/2 tab every pm (total of 75 mg daily)    Paroxysmal atrial fibrillation (H)       PROSTEON PO      Take 500 mg by mouth 2 tablets twice daily        simvastatin 20 MG tablet    ZOCOR     Take 20 mg by mouth At Bedtime        TYLENOL PM EXTRA STRENGTH PO      Take 500 mg by mouth daily

## 2018-03-14 ENCOUNTER — ALLIED HEALTH/NURSE VISIT (OUTPATIENT)
Dept: CARDIOLOGY | Facility: CLINIC | Age: 83
End: 2018-03-14
Payer: COMMERCIAL

## 2018-03-14 DIAGNOSIS — Z95.0 CARDIAC PACEMAKER IN SITU: ICD-10-CM

## 2018-03-14 PROCEDURE — 93280 PM DEVICE PROGR EVAL DUAL: CPT | Performed by: INTERNAL MEDICINE

## 2018-03-14 NOTE — PROGRESS NOTES
Medtronic Adapta L ADDRL 1 Pacemaker Device Check  AP: 100 % : 2 %  Mode: AAIR-DDDR  bpm        Underlying Rhythm: SB in the 30's  Heart Rate: Stable with good variability.  Sensing: WNL, no P waves    Pacing Threshold: WNL   Impedance: WNL  Battery Status: Approximately 12 years longevity  Device Site: Well healed  Atrial Arrhythmia: Since last reset 3/1/17 193 mode switches comprising <0.6% of the time, EGMs showed PAF with controlled VR, longest lasting 11 hours. Patient is on Eliquis.  Ventricular Arrhythmia: 0  Setting Change: 0    Care Plan: Follow up with Q 3 month remote checks. BRIAN Maldonado

## 2018-03-14 NOTE — MR AVS SNAPSHOT
"              After Visit Summary   3/14/2018    Jose Rucker    MRN: [id number]           Patient Information     Date Of Birth          2/4/1931        Visit Information        Provider Department      3/14/2018 10:10 AM DAVID TOM [facility]        Today's Diagnoses     Cardiac pacemaker in situ           Follow-ups after your visit        Your next 10 appointments already scheduled     Jun 18, 2018  2:15 PM CDT   Remote PPM Check with NELSON TECH1   [facility] ([facility])    [address] [place], [place] [id number]  [facility] [address] [phone]           This appointment is for a remote check of your pacemaker.  This is not an appointment at the office.              Who to contact     If you have questions or need follow up information about today's clinic visit or your schedule please contact [facility] directly at [phone].  Normal or non-critical lab and imaging results will be communicated to you by Zhijiang Jonway Automobilehart, letter or phone within 4 business days after the clinic has received the results. If you do not hear from us within 7 days, please contact the clinic through [employer] or phone. If you have a critical or abnormal lab result, we will notify you by phone as soon as possible.  Submit refill requests through Twilio or call your pharmacy and they will forward the refill request to us. Please allow 3 business days for your refill to be completed.          Additional Information About Your Visit        Zh[employer]way [employer] Information     Twilio lets you send messages to your doctor, view your test results, renew your prescriptions, schedule appointments and more. To sign up, go to [email or web address] . Click on \"Log in\" on the left side of the screen, which will take you to the Welcome page. Then click on \"Sign up Now\" on the right side of the page.     You will be asked to enter the " access code listed below, as well as some personal information. Please follow the directions to create your username and password.     Your access code is: [email or web address]  Expires: 3/18/2018 11:41 AM     Your access code will  in 90 days. If you need help or a new code, please call your [place] clinic or [phone].        Care EveryWhere ID     This is your Care EveryWhere ID. This could be used by other organizations to access your [place] medical records  [id number]         Blood Pressure from Last 3 Encounters:   17 132/70   16 130/72   02/10/16 135/62    Weight from Last 3 Encounters:   17 82.6 kg (182 lb)   17 80.7 kg (178 lb)   16 83.9 kg (185 lb)              We Performed the Following     Follow-Up with Device Clinic     PM DEVICE PROGRAMMING EVAL, DUAL LEAD PACER (05725)        Primary Care Provider Office Phone # Fax #    Octaviano Simmons [phone]       [facility] [address] [place] [address]        Equal Access to Services     BAUTISTA QUEEN AH: Hadii aad ku hadasho Soomaali, waaxda luqadaha, qaybta kaalmada adeegyada, waxay christie hayjoen oh pérez. So [facility] [phone].    ATENCIÓN: Si habla español, tiene a varela disposición servicios gratuitos de asistencia lingüística. [facility] [phone].    We comply with applicable federal civil rights laws and [place] laws. We do not discriminate on the basis of race, color, national origin, age, disability, sex, sexual orientation, or gender identity.            Thank you!     Thank you for choosing [facility] HEART [facility]  for your care. Our goal is always to provide you with excellent care. Hearing back from our patients is one way we can continue to improve our services. Please take a few minutes to complete the written survey that you may receive in the mail after your visit with us. Thank you!             Your Updated Medication  List - Protect others around you: Learn how to safely use, store and throw away your medicines at www.disposemymeds.org.          This list is accurate as of 3/14/18 10:49 AM.  Always use your most recent med list.                   Brand Name Dispense Instructions for use Diagnosis    apixaban ANTICOAGULANT 5 MG tablet    ELIQUIS    180 tablet    Take 1 tablet (5 mg) by mouth 2 times daily    A-fib (H)       furosemide 40 MG tablet    LASIX    90 tablet    Take 1 tablet (40 mg) by mouth daily    Shortness of breath       lisinopril 10 MG tablet    PRINIVIL/ZESTRIL    90 tablet    Take 1 tablet (10 mg) by mouth daily    Essential hypertension, benign       metoprolol succinate 50 MG 24 hr tablet    TOPROL-XL    135 tablet    Take 1 tab every am and 1/2 tab every pm (total of 75 mg daily)    Paroxysmal atrial fibrillation (H)       PROSTEON PO      Take 500 mg by mouth 2 tablets twice daily        simvastatin 20 MG tablet    ZOCOR     Take 20 mg by mouth At Bedtime        TYLENOL PM EXTRA STRENGTH PO      Take 500 mg by mouth daily

## 2018-06-25 ENCOUNTER — ALLIED HEALTH/NURSE VISIT (OUTPATIENT)
Dept: CARDIOLOGY | Facility: CLINIC | Age: 83
End: 2018-06-25
Payer: COMMERCIAL

## 2018-06-25 DIAGNOSIS — Z95.0 CARDIAC PACEMAKER IN SITU: Primary | ICD-10-CM

## 2018-06-25 PROCEDURE — 93296 REM INTERROG EVL PM/IDS: CPT | Performed by: INTERNAL MEDICINE

## 2018-06-25 PROCEDURE — 93294 REM INTERROG EVL PM/LDLS PM: CPT | Performed by: INTERNAL MEDICINE

## 2018-06-25 NOTE — MR AVS SNAPSHOT
After Visit Summary   6/25/2018    Jose Rucker    MRN: [id number]           Patient Information     Date Of Birth          2/4/1931        Visit Information        Provider Department      6/25/2018 3:30 PM LESLIE HANNA [facility]        Today's Diagnoses     Cardiac pacemaker in situ    -  1       Follow-ups after your visit        Your next 10 appointments already scheduled     Jun 25, 2018  3:30 PM CDT   Remote PPM Check with LESLIE HANNA   [facility] ([facility])    [address]  [facility] [id number] [phone] OPT 2           This appointment is for a remote check of your pacemaker.  This is not an appointment at the office.            Sep 05, 2018 12:30 PM CDT   Ech Complete with [address] ([facility])    [address]  [facility] [address] [phone]           1. Please bring or wear a comfortable two-piece outfit. 2. You may eat, drink and take your normal medicines. 3. For any questions that cannot be answered, please contact the ordering physician ***Please check-in at the [place] Registration Office located in [address] in the [facility] building. When you are finished registering, please go to [address] and have a seat. The technician will call your name for the test.            Sep 05, 2018  3:45 PM CDT   Return Visit with Mode Ya MD   [facility] ([facility])    [address]  [facility] [address] [phone]              Who to contact     If you have questions or need follow up information about today's clinic visit or your schedule please contact [facility] directly at [phone].  Normal or non-critical lab and imaging results will be communicated to you  "by Jobbrhart, letter or phone within 4 business days after the clinic has received the results. If you do not hear from us within 7 days, please contact the clinic through Flowtownt or phone. If you have a critical or abnormal lab result, we will notify you by phone as soon as possible.  Submit refill requests through Unruly Â® or call your pharmacy and they will forward the refill request to us. Please allow 3 business days for your refill to be completed.          Additional Information About Your Visit        [employer] Information     Unruly Â® lets you send messages to your doctor, view your test results, renew your prescriptions, schedule appointments and more. To sign up, go to [email or web address] . Click on \"Log in\" on the left side of the screen, which will take you to the Welcome page. Then click on \"Sign up Now\" on the right side of the page.     You will be asked to enter the access code listed below, as well as some personal information. Please follow the directions to create your username and password.     Your access code is: [email or web address]  Expires: 2018 11:02 AM     Your access code will  in 90 days. If you need help or a new code, please call your [place] clinic or [phone].        Care EveryWhere ID     This is your Care EveryWhere ID. This could be used by other organizations to access your [place] medical records  [id number]         Blood Pressure from Last 3 Encounters:   17 132/70   16 130/72   02/10/16 135/62    Weight from Last 3 Encounters:   17 82.6 kg (182 lb)   17 80.7 kg (178 lb)   16 83.9 kg (185 lb)              We Performed the Following     INTERROGATION DEVICE EVAL REMOTE, PACER/ICD (78608)     PM DEVICE INTERROGATE REMOTE (58851)        Primary Care Provider Office Phone # Fax #    Octaviano Simmons [phone]       [facility] [address] [place] [address]        Equal Access to Services     [facility] " GAAR : Hadii aad ku hadatilioo Sonaderali, waaxda luqadaha, qaybta kaalmada adeegevetteda, paul dakotain hayaan oh missy natalie . So [facility] [phone].    ATENCIÓN: Si habla español, tiene a varela disposición servicios gratuitos de asistencia lingüística. Llame al [phone].    We comply with applicable federal civil rights laws and [place] laws. We do not discriminate on the basis of race, color, national origin, age, disability, sex, sexual orientation, or gender identity.            Thank you!     Thank you for choosing [facility] HEART [facility]  for your care. Our goal is always to provide you with excellent care. Hearing back from our patients is one way we can continue to improve our services. Please take a few minutes to complete the written survey that you may receive in the mail after your visit with us. Thank you!             Your Updated Medication List - Protect others around you: Learn how to safely use, store and throw away your medicines at www.disposemymeds.org.          This list is accurate as of 6/25/18 11:02 AM.  Always use your most recent med list.                   Brand Name Dispense Instructions for use Diagnosis    apixaban ANTICOAGULANT 5 MG tablet    ELIQUIS    180 tablet    Take 1 tablet (5 mg) by mouth 2 times daily    A-fib (H)       furosemide 40 MG tablet    LASIX    90 tablet    Take 1 tablet (40 mg) by mouth daily    Shortness of breath       lisinopril 10 MG tablet    PRINIVIL/ZESTRIL    90 tablet    Take 1 tablet (10 mg) by mouth daily    Essential hypertension, benign       metoprolol succinate 50 MG 24 hr tablet    TOPROL-XL    135 tablet    Take 1 tab every am and 1/2 tab every pm (total of 75 mg daily)    Paroxysmal atrial fibrillation (H)       PROSTEON PO      Take 500 mg by mouth 2 tablets twice daily        simvastatin 20 MG tablet    ZOCOR     Take 20 mg by mouth At Bedtime        TYLENOL PM EXTRA STRENGTH PO      Take 500 mg by mouth daily

## 2018-06-25 NOTE — PROGRESS NOTES
Medtronic Adapta ADDRL1 (D) Remote PPM Device Check  AP: 99% : 0%  Mode: AAIR <=> DDDR        Presenting Rhythm: AP/VS  Heart Rate: adequate heart rates per histogram  Sensing: RA: not performed RV: stable    Pacing Threshold: stable    Impedance: stable  Battery Status: 10 - 13.5 years remaining  Atrial Arrhythmia: 112 mode switch episodes comprising 0.8% of the time. Longest episode lasted 10hr 29 min. Majority of vent rates are 100 - 140bpm while in mode switch. Taking Eliquis   Ventricular Arrhythmia: none     Care Plan: F/U Carelink q 3 months. Pt is scheduled to see Dr. Butcher in September. Gave results and next transmission date over the phone to pt's wife. Dom CARDENAS

## 2018-07-16 ENCOUNTER — HOSPITAL ENCOUNTER (OUTPATIENT)
Dept: LAB | Facility: CLINIC | Age: 83
Discharge: HOME | End: 2018-07-16
Attending: UROLOGY | Admitting: UROLOGY
Payer: MEDICARE

## 2018-07-16 DIAGNOSIS — Z85.46 PERSONAL HISTORY OF MALIGNANT NEOPLASM OF PROSTATE: ICD-10-CM

## 2018-07-16 DIAGNOSIS — Z85.46 PERSONAL HISTORY OF MALIGNANT NEOPLASM OF PROSTATE: Primary | ICD-10-CM

## 2018-07-16 LAB — PSA SERPL-MCNC: <0.01 UG/L (ref 0–4)

## 2018-07-16 PROCEDURE — 84153 ASSAY OF PSA TOTAL: CPT | Performed by: UROLOGY

## 2018-07-16 PROCEDURE — 36415 COLL VENOUS BLD VENIPUNCTURE: CPT | Performed by: UROLOGY

## 2018-07-23 ENCOUNTER — OFFICE VISIT (OUTPATIENT)
Dept: UROLOGY | Facility: CLINIC | Age: 83
End: 2018-07-23
Payer: COMMERCIAL

## 2018-07-23 VITALS — WEIGHT: 182 LBS | HEIGHT: 70 IN | OXYGEN SATURATION: 96 % | BODY MASS INDEX: 26.05 KG/M2 | HEART RATE: 72 BPM

## 2018-07-23 DIAGNOSIS — Z85.46 PERSONAL HISTORY OF MALIGNANT NEOPLASM OF PROSTATE: Primary | ICD-10-CM

## 2018-07-23 PROCEDURE — 99213 OFFICE O/P EST LOW 20 MIN: CPT | Performed by: UROLOGY

## 2018-07-23 ASSESSMENT — PAIN SCALES - GENERAL: PAINLEVEL: NO PAIN (0)

## 2018-07-23 NOTE — MR AVS SNAPSHOT
After Visit Summary   7/23/2018    Jose Rucker    MRN: [id number]           Patient Information     Date Of Birth          2/4/1931        Visit Information        Provider Department      7/23/2018 11:15 AM Boone Huerta MD [facility] [place]        Today's Diagnoses     Personal history of malignant neoplasm of prostate    -  1       Follow-ups after your visit        Follow-up notes from your care team     Return in about 1 year (around 7/23/2019) for See PA for PSA.      Your next 10 appointments already scheduled     Sep 05, 2018 12:30 PM CDT   Ech Complete with [id number] ([facility])    [address]  [place] [id number] [phone]           1.  Please bring or wear a comfortable two-piece outfit. 2.  You may eat, drink and take your normal medicines. 3.  For any questions that cannot be answered, please contact the ordering physician 4.  Please do not wear perfumes or scented lotions on the day of your exam. ***Please check-in at the [place] Registration Office located in [address] in the [facility] building. When you are finished registering, please go to [address] and have a seat. The technician will call your name for the test.            Sep 05, 2018  3:45 PM CDT   Return Visit with Mode Ya MD   [facility] ([facility])    [address]  [facility] [place] [id number] [phone]            Oct 08, 2018  4:15 PM CDT   Remote PPM Check with NELSON TECH1   [facility] ([facility])    [address] [place] [address] [address]  [facility] [address] [phone] OPT 2           This appointment is for a remote check of your pacemaker.  This is not an appointment at the office.              Future tests that were ordered for you today  "    Open Future Orders        Priority Expected Expires Ordered    PSA tumor marker Routine  2019            Who to contact     If you have questions or need follow up information about today's clinic visit or your schedule please contact [facility] [place] directly at [phone].  Normal or non-critical lab and imaging results will be communicated to you by MyChart, letter or phone within 4 business days after the clinic has received the results. If you do not hear from us within 7 days, please contact the clinic through tuulhart or phone. If you have a critical or abnormal lab result, we will notify you by phone as soon as possible.  Submit refill requests through Famigo or call your pharmacy and they will forward the refill request to us. Please allow 3 business days for your refill to be completed.          Additional Information About Your Visit        MyChart Information     Famigo lets you send messages to your doctor, view your test results, renew your prescriptions, schedule appointments and more. To sign up, go to [email or web address] . Click on \"Log in\" on the left side of the screen, which will take you to the Welcome page. Then click on \"Sign up Now\" on the right side of the page.     You will be asked to enter the access code listed below, as well as some personal information. Please follow the directions to create your username and password.     Your access code is: [email or web address]  Expires: 2018 11:02 AM     Your access code will  in 90 days. If you need help or a new code, please call your [place] [facility] clinic or [phone].        Care EveryWhere ID     This is your Care EveryWhere ID. This could be used by other organizations to access your [facility] medical records  [id number]        Your Vitals Were     Pulse Height Pulse Oximetry BMI (Body Mass Index)          72 1.778 m (5' 10\") 96% 26.11 kg/m2         Blood Pressure from Last 3 " Encounters:   08/01/17 132/70   06/22/16 130/72   02/10/16 135/62    Weight from Last 3 Encounters:   07/23/18 82.6 kg (182 lb)   08/01/17 82.6 kg (182 lb)   06/28/17 80.7 kg (178 lb)               Primary Care Provider Office Phone # Fax #    Octaviano Simmons [phone]       [facility] [address] [place] [address]        Equal Access to Services     [facility]: Hadii aad ku hadasho Soomaali, waaxda luqadaha, qaybta kaalmada adeegyada, waxay idiin hayaan adeeg kharaeneida estrada . So [facility] [phone].    ATENCIÓN: Si habla español, tiene a varela disposición servicios gratuitos de asistencia lingüística. [place] [phone].    We comply with applicable federal civil rights laws and [place] laws. We do not discriminate on the basis of race, color, national origin, age, disability, sex, sexual orientation, or gender identity.            Thank you!     Thank you for choosing [facility] [place]  for your care. Our goal is always to provide you with excellent care. Hearing back from our patients is one way we can continue to improve our services. Please take a few minutes to complete the written survey that you may receive in the mail after your visit with us. Thank you!             Your Updated Medication List - Protect others around you: Learn how to safely use, store and throw away your medicines at www.disposemymeds.org.          This list is accurate as of 7/23/18 11:28 AM.  Always use your most recent med list.                   Brand Name Dispense Instructions for use Diagnosis    apixaban ANTICOAGULANT 5 MG tablet    ELIQUIS    180 tablet    Take 1 tablet (5 mg) by mouth 2 times daily    A-fib (H)       furosemide 40 MG tablet    LASIX    90 tablet    Take 1 tablet (40 mg) by mouth daily    Shortness of breath       lisinopril 10 MG tablet    PRINIVIL/ZESTRIL    90 tablet    Take 1 tablet (10 mg) by mouth daily    Essential  hypertension, benign       metoprolol succinate 50 MG 24 hr tablet    TOPROL-XL    135 tablet    Take 1 tab every am and 1/2 tab every pm (total of 75 mg daily)    Paroxysmal atrial fibrillation (H)       PROSTEON PO      Take 500 mg by mouth 2 tablets twice daily        simvastatin 20 MG tablet    ZOCOR     Take 20 mg by mouth At Bedtime        TYLENOL PM EXTRA STRENGTH PO      Take 500 mg by mouth daily

## 2018-07-23 NOTE — LETTER
7/23/2018       RE: Jose [place]UR[place]O [place]  [address] [place] [address]     Dear Colleague,    Thank you for referring your patient, Jose Rucker, [facility]. Please see a copy of my visit note below.    Office Visit Note  [facility]  [phone]    UROLOGIC DIAGNOSES:   Mekhi grade 10 prostate cancer    CURRENT INTERVENTIONS:   S/P combined hormonal therapy and radiotherapy in 2008    HISTORY:   Jose returns to clinic today for annual prostate cancer follow-up. His recent PSA remains undetectable. He continues to do well with no complaints.      PAST MEDICAL HISTORY:   Past Medical History:   Diagnosis Date     A-fib (H)      Bradycardia     PPM 5/27/2008     Hernia, abdominal      HTN (hypertension)      Palpitations      Sick sinus syndrome (H)      Syncope        PAST SURGICAL HISTORY:   Past Surgical History:   Procedure Laterality Date     CYSTOSCOPY       EP PPM GENERATOR CHANGE DUAL  2/10/16     HERNIA REPAIR       IMPLANT PACEMAKER  5/7/08    Medtronic Sensia, model# SEDR01, serial# [id number]     PROSTATE SURGERY         FAMILY HISTORY:   Family History   Problem Relation Age of Onset     HEART DISEASE Mother        SOCIAL HISTORY:   Social History   Substance Use Topics     Smoking status: Former Smoker     Smokeless tobacco: Never Used      Comment: quit about 60 years ago     Alcohol use Yes      Comment: 2 beers daily       Current Outpatient Prescriptions   Medication     apixaban ANTICOAGULANT (ELIQUIS) 5 MG tablet     Diphenhydramine-APAP, sleep, (TYLENOL PM EXTRA STRENGTH PO)     furosemide (LASIX) 40 MG tablet     lisinopril (PRINIVIL/ZESTRIL) 10 MG tablet     metoprolol (TOPROL-XL) 50 MG 24 hr tablet     Multiple Minerals-Vitamins (PROSTEON PO)     simvastatin (ZOCOR) 20 MG tablet     No current facility-administered medications for this visit.           PHYSICAL EXAM:    There were no vitals taken for this visit.    HEENT: Normocephalic and atraumatic   Cardiac: Not done  Back/Flank: Not done  CNS/PNS: Not done  Respiratory: Normal non-labored breathing  Abdomen: Soft nontender and nondistended  Peripheral Vascular: Not done  Mental Status: Not done    Penis: Not done  Scrotal Skin: Not done  Testicles: Not done  Epididymis: Not done  Digital Rectal Exam:     Cystoscopy: Not done    Imaging: None    Urinalysis: UA RESULTS:  No results for input(s): COLOR, APPEARANCE, URINEGLC, URINEBILI, URINEKETONE, SG, UBLD, URINEPH, PROTEIN, UROBILINOGEN, NITRITE, LEUKEST, RBCU, WBCU in the last 40415 hours.    PSA: undetectable    Post Void Residual:     Other labs: None today      IMPRESSION:  Doing well, PSA undetectable    PLAN:  I recommended that he continue to have the PSA checked on an annual basis. We will see him back next year for this.    Total Time: 10 minutes                                      Total in Consultation: 10 minutes      Boone Huerta M.D.

## 2018-07-23 NOTE — PROGRESS NOTES
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 His recent PSA remains undetectable. He continues to do well with no complaints.      PAST MEDICAL HISTORY:   Past Medical History:   Diagnosis Date     A-fib (H)      Bradycardia     PPM 5/27/2008     Hernia, abdominal      HTN (hypertension)      Palpitations      Sick sinus syndrome (H)      Syncope        PAST SURGICAL HISTORY:   Past Surgical History:   Procedure Laterality Date     CYSTOSCOPY       EP PPM GENERATOR CHANGE DUAL  2/10/16     HERNIA REPAIR       IMPLANT PACEMAKER  5/7/08    Medtronic Sensia, model# SEDR01, serial# [id number]     PROSTATE SURGERY         FAMILY HISTORY:   Family History   Problem Relation Age of Onset     HEART DISEASE Mother        SOCIAL HISTORY:   Social History   Substance Use Topics     Smoking status: Former Smoker     Smokeless tobacco: Never Used      Comment: quit about 60 years ago     Alcohol use Yes      Comment: 2 beers daily       Current Outpatient Prescriptions   Medication     apixaban ANTICOAGULANT (ELIQUIS) 5 MG tablet     Diphenhydramine-APAP, sleep, (TYLENOL PM EXTRA STRENGTH PO)     furosemide (LASIX) 40 MG tablet     lisinopril (PRINIVIL/ZESTRIL) 10 MG tablet     metoprolol (TOPROL-XL) 50 MG 24 hr tablet     Multiple Minerals-Vitamins (PROSTEON PO)     simvastatin (ZOCOR) 20 MG tablet     No current facility-administered medications for this visit.          PHYSICAL EXAM:    There were no vitals taken for this visit.    HEENT: Normocephalic and atraumatic   Cardiac: Not done  Back/Flank: Not done  CNS/PNS: Not done  Respiratory: Normal non-labored breathing  Abdomen: Soft nontender and nondistended  Peripheral Vascular: Not done  Mental Status: Not done    Penis: Not done  Scrotal  Skin: Not done  Testicles: Not done  Epididymis: Not done  Digital Rectal Exam:     Cystoscopy: Not done    Imaging: None    Urinalysis: UA RESULTS:  No results for input(s): COLOR, APPEARANCE, URINEGLC, URINEBILI, URINEKETONE, SG, UBLD, URINEPH, PROTEIN, UROBILINOGEN, NITRITE, LEUKEST, RBCU, WBCU in the last 30491 hours.    PSA: undetectable    Post Void Residual:     Other labs: None today      IMPRESSION:  Doing well, PSA undetectable    PLAN:  I recommended that he continue to have the PSA checked on an annual basis. We will see him back next year for this.    Total Time: 10 minutes                                      Total in Consultation: 10 minutes      Boone Huerta M.D.

## 2018-09-05 ENCOUNTER — OFFICE VISIT (OUTPATIENT)
Dept: CARDIOLOGY | Facility: CLINIC | Age: 83
End: 2018-09-05
Payer: COMMERCIAL

## 2018-09-05 ENCOUNTER — HOSPITAL ENCOUNTER (OUTPATIENT)
Dept: CARDIOLOGY | Facility: CLINIC | Age: 83
Discharge: HOME | End: 2018-09-05
Attending: INTERNAL MEDICINE | Admitting: INTERNAL MEDICINE
Payer: MEDICARE

## 2018-09-05 VITALS
WEIGHT: 178.1 LBS | HEART RATE: 68 BPM | HEIGHT: 70 IN | SYSTOLIC BLOOD PRESSURE: 110 MMHG | BODY MASS INDEX: 25.5 KG/M2 | DIASTOLIC BLOOD PRESSURE: 56 MMHG

## 2018-09-05 DIAGNOSIS — I42.9 CARDIOMYOPATHY (H): ICD-10-CM

## 2018-09-05 DIAGNOSIS — I42.9 CARDIOMYOPATHY, UNSPECIFIED TYPE (H): ICD-10-CM

## 2018-09-05 DIAGNOSIS — I50.22 CHRONIC SYSTOLIC CONGESTIVE HEART FAILURE (H): ICD-10-CM

## 2018-09-05 DIAGNOSIS — I49.5 SSS (SICK SINUS SYNDROME) (H): ICD-10-CM

## 2018-09-05 DIAGNOSIS — Z95.0 CARDIAC PACEMAKER IN SITU: ICD-10-CM

## 2018-09-05 DIAGNOSIS — I48.0 PAROXYSMAL ATRIAL FIBRILLATION (H): ICD-10-CM

## 2018-09-05 PROCEDURE — 25500064 ZZH RX 255 OP 636: Performed by: INTERNAL MEDICINE

## 2018-09-05 PROCEDURE — 93306 TTE W/DOPPLER COMPLETE: CPT

## 2018-09-05 PROCEDURE — 99214 OFFICE O/P EST MOD 30 MIN: CPT | Performed by: INTERNAL MEDICINE

## 2018-09-05 PROCEDURE — 93306 TTE W/DOPPLER COMPLETE: CPT | Mod: 26 | Performed by: INTERNAL MEDICINE

## 2018-09-05 PROCEDURE — 93000 ELECTROCARDIOGRAM COMPLETE: CPT | Performed by: INTERNAL MEDICINE

## 2018-09-05 RX ORDER — METOPROLOL SUCCINATE 50 MG/1
50 TABLET, EXTENDED RELEASE ORAL 2 TIMES DAILY
Qty: 180 TABLET | Refills: 3 | Status: SHIPPED | OUTPATIENT
Start: 2018-09-05 | End: 2019-09-18

## 2018-09-05 RX ADMIN — HUMAN ALBUMIN MICROSPHERES AND PERFLUTREN 3 ML: 10; .22 INJECTION, SOLUTION INTRAVENOUS at 13:34

## 2018-09-05 NOTE — LETTER
9/5/2018    Cathy Jones NP  [facility] [address]  [facility] [address]    RE: Jose Rucker       Dear Colleague,    I had the pleasure of seeing Jose Rucker in the [facility].    HPI and Plan:   See dictation    Orders Placed This Encounter   Procedures     Basic metabolic panel     Follow-Up with Cardiologist     EKG 12-lead complete w/read - Clinics (performed today)     EKG 12-lead complete w/read - Clinics (to be scheduled)     Echocardiogram       Orders Placed This Encounter   Medications     metoprolol succinate (TOPROL-XL) 50 MG 24 hr tablet     Sig: Take 1 tablet (50 mg) by mouth 2 times daily     Dispense:  180 tablet     Refill:  3       Medications Discontinued During This Encounter   Medication Reason     metoprolol (TOPROL-XL) 50 MG 24 hr tablet Reorder         Encounter Diagnoses   Name Primary?     SSS (sick sinus syndrome) (H)      Cardiac pacemaker in situ      Paroxysmal atrial fibrillation (H)      Chronic systolic congestive heart failure (H)      Cardiomyopathy, unspecified type (H)        CURRENT MEDICATIONS:  Current Outpatient Prescriptions   Medication Sig Dispense Refill     apixaban ANTICOAGULANT (ELIQUIS) 5 MG tablet Take 1 tablet (5 mg) by mouth 2 times daily 180 tablet 3     Diphenhydramine-APAP, sleep, (TYLENOL PM EXTRA STRENGTH PO) Take 500 mg by mouth daily       furosemide (LASIX) 40 MG tablet Take 1 tablet (40 mg) by mouth daily 90 tablet 1     lisinopril (PRINIVIL/ZESTRIL) 10 MG tablet Take 1 tablet (10 mg) by mouth daily 90 tablet 2     metoprolol succinate (TOPROL-XL) 50 MG 24 hr tablet Take 1 tablet (50 mg) by mouth 2 times daily 180 tablet 3     Multiple Minerals-Vitamins (PROSTEON PO) Take 500 mg by mouth 2 tablets twice daily       simvastatin (ZOCOR) 20 MG tablet Take 20 mg by mouth At Bedtime       [DISCONTINUED] metoprolol (TOPROL-XL) 50 MG 24 hr tablet Take 1 tab every am and 1/2 tab every pm (total  "of 75 mg daily) 135 tablet 3       ALLERGIES     Allergies   Allergen Reactions     Penicillin G Rash     And patient had an awful smell        PAST MEDICAL HISTORY:  Past Medical History:   Diagnosis Date     A-fib (H)      Bradycardia     PPM 5/27/2008     Hernia, abdominal      HTN (hypertension)      Palpitations      Primary cardiomyopathy (H) 11/4/2014     Problem list name updated by automated process. Provider to review     Sick sinus syndrome (H)      Syncope        PAST SURGICAL HISTORY:  Past Surgical History:   Procedure Laterality Date     CYSTOSCOPY       EP PPM GENERATOR CHANGE DUAL  2/10/16     HERNIA REPAIR       IMPLANT PACEMAKER  5/7/08    Medtronic Sensia, model# SEDR01, serial# [id number]     PROSTATE SURGERY         FAMILY HISTORY:  Family History   Problem Relation Age of Onset     HEART DISEASE Mother        SOCIAL HISTORY:  Social History     Social History     Marital status:      Spouse name: N/A     Number of children: N/A     Years of education: N/A     Social History Main Topics     Smoking status: Former Smoker     Packs/day: 0.50     Years: 6.00     Types: Cigarettes     Quit date: 1956     Smokeless tobacco: Never Used     Alcohol use Yes      Comment: 2 beers daily     Drug use: None     Sexual activity: Not Asked     Other Topics Concern     Caffeine Concern No     2 cups daily     Special Diet No     Exercise No     some stairs, yardwork, uses exercise ball     Social History Narrative       Review of Systems:  Skin:  Negative       Eyes:  Positive for glasses    ENT:  Negative      Respiratory:  Negative       Cardiovascular:  Negative      Gastroenterology: Negative      Genitourinary:  Negative      Musculoskeletal:  Positive for arthritis    Neurologic:  Positive for numbness or tingling of hands left hand  Psychiatric:  Negative      Heme/Lymph/Imm:  Negative      Endocrine:  Negative        Physical Exam:  Vitals: /56  Pulse 68  Ht 1.778 m (5' 10\")  Wt 80.8 " kg (178 lb 1.6 oz)  BMI 25.55 kg/m2    Constitutional:  cooperative, alert and oriented, well developed, well nourished, in no acute distress;cooperative overweight      Skin:  warm and dry to the touch, no apparent skin lesions or masses noted   pacemaker incision in the left infraclavicular area was well-healed      Head:  normocephalic, no masses or lesions        Eyes:  pupils equal and round, conjunctivae and lids unremarkable, sclera white, no xanthalasma, EOMS intact, no nystagmus        Lymph:      ENT:  no pallor or cyanosis, dentition good        Neck:  carotid pulses are full and equal bilaterally, JVP normal, no carotid bruit        Respiratory:  normal breath sounds, clear to auscultation, normal A-P diameter, normal symmetry, normal respiratory excursion, no use of accessory muscles  (Occassional coarse crackles at right base.)       Cardiac: regular rhythm, normal S1/S2, no S3 or S4, apical impulse not displaced, no murmurs, gallops or rubs                pulses full and equal                                        GI:  not assessed this visit        Extremities and Muscular Skeletal:  no deformities, clubbing, cyanosis, erythema observed;no edema stasis pigmentation            Neurological:  no gross motor deficits;affect appropriate        Psych:  Alert and Oriented x 3        CC  Mode Ya MD  [address]  [place] [address]                Thank you for allowing me to participate in the care of your patient.      Sincerely,     Mode Ya MD, MD     [facility]    cc:   Mode Ya MD  [address]  [place] [address]

## 2018-09-05 NOTE — PROGRESS NOTES
HPI and Plan:   See dictation    Orders Placed This Encounter   Procedures     Basic metabolic panel     Follow-Up with Cardiologist     EKG 12-lead complete w/read - Clinics (performed today)     EKG 12-lead complete w/read - Clinics (to be scheduled)     Echocardiogram       Orders Placed This Encounter   Medications     metoprolol succinate (TOPROL-XL) 50 MG 24 hr tablet     Sig: Take 1 tablet (50 mg) by mouth 2 times daily     Dispense:  180 tablet     Refill:  3       Medications Discontinued During This Encounter   Medication Reason     metoprolol (TOPROL-XL) 50 MG 24 hr tablet Reorder         Encounter Diagnoses   Name Primary?     SSS (sick sinus syndrome) (H)      Cardiac pacemaker in situ      Paroxysmal atrial fibrillation (H)      Chronic systolic congestive heart failure (H)      Cardiomyopathy, unspecified type (H)        CURRENT MEDICATIONS:  Current Outpatient Prescriptions   Medication Sig Dispense Refill     apixaban ANTICOAGULANT (ELIQUIS) 5 MG tablet Take 1 tablet (5 mg) by mouth 2 times daily 180 tablet 3     Diphenhydramine-APAP, sleep, (TYLENOL PM EXTRA STRENGTH PO) Take 500 mg by mouth daily       furosemide (LASIX) 40 MG tablet Take 1 tablet (40 mg) by mouth daily 90 tablet 1     lisinopril (PRINIVIL/ZESTRIL) 10 MG tablet Take 1 tablet (10 mg) by mouth daily 90 tablet 2     metoprolol succinate (TOPROL-XL) 50 MG 24 hr tablet Take 1 tablet (50 mg) by mouth 2 times daily 180 tablet 3     Multiple Minerals-Vitamins (PROSTEON PO) Take 500 mg by mouth 2 tablets twice daily       simvastatin (ZOCOR) 20 MG tablet Take 20 mg by mouth At Bedtime       [DISCONTINUED] metoprolol (TOPROL-XL) 50 MG 24 hr tablet Take 1 tab every am and 1/2 tab every pm (total of 75 mg daily) 135 tablet 3       ALLERGIES     Allergies   Allergen Reactions     Penicillin G Rash     And patient had an awful smell        PAST MEDICAL HISTORY:  Past Medical History:   Diagnosis Date     A-fib (H)      Bradycardia     PPM  "5/27/2008     Hernia, abdominal      HTN (hypertension)      Palpitations      Primary cardiomyopathy (H) 11/4/2014     Problem list name updated by automated process. Provider to review     Sick sinus syndrome (H)      Syncope        PAST SURGICAL HISTORY:  Past Surgical History:   Procedure Laterality Date     CYSTOSCOPY       EP PPM GENERATOR CHANGE DUAL  2/10/16     HERNIA REPAIR       IMPLANT PACEMAKER  5/7/08    Medtronic Sensia, model# SEDR01, serial# [id number]     PROSTATE SURGERY         FAMILY HISTORY:  Family History   Problem Relation Age of Onset     HEART DISEASE Mother        SOCIAL HISTORY:  Social History     Social History     Marital status:      Spouse name: N/A     Number of children: N/A     Years of education: N/A     Social History Main Topics     Smoking status: Former Smoker     Packs/day: 0.50     Years: 6.00     Types: Cigarettes     Quit date: 1956     Smokeless tobacco: Never Used     Alcohol use Yes      Comment: 2 beers daily     Drug use: None     Sexual activity: Not Asked     Other Topics Concern     Caffeine Concern No     2 cups daily     Special Diet No     Exercise No     some stairs, yardwork, uses exercise ball     Social History Narrative       Review of Systems:  Skin:  Negative       Eyes:  Positive for glasses    ENT:  Negative      Respiratory:  Negative       Cardiovascular:  Negative      Gastroenterology: Negative      Genitourinary:  Negative      Musculoskeletal:  Positive for arthritis    Neurologic:  Positive for numbness or tingling of hands left hand  Psychiatric:  Negative      Heme/Lymph/Imm:  Negative      Endocrine:  Negative        Physical Exam:  Vitals: /56  Pulse 68  Ht 1.778 m (5' 10\")  Wt 80.8 kg (178 lb 1.6 oz)  BMI 25.55 kg/m2    Constitutional:  cooperative, alert and oriented, well developed, well nourished, in no acute distress;cooperative overweight      Skin:  warm and dry to the touch, no apparent skin lesions or masses noted  "  pacemaker incision in the left infraclavicular area was well-healed      Head:  normocephalic, no masses or lesions        Eyes:  pupils equal and round, conjunctivae and lids unremarkable, sclera white, no xanthalasma, EOMS intact, no nystagmus        Lymph:      ENT:  no pallor or cyanosis, dentition good        Neck:  carotid pulses are full and equal bilaterally, JVP normal, no carotid bruit        Respiratory:  normal breath sounds, clear to auscultation, normal A-P diameter, normal symmetry, normal respiratory excursion, no use of accessory muscles  (Occassional coarse crackles at right base.)       Cardiac: regular rhythm, normal S1/S2, no S3 or S4, apical impulse not displaced, no murmurs, gallops or rubs                pulses full and equal                                        GI:  not assessed this visit        Extremities and Muscular Skeletal:  no deformities, clubbing, cyanosis, erythema observed;no edema stasis pigmentation            Neurological:  no gross motor deficits;affect appropriate        Psych:  Alert and Oriented x 3        CC  Mode Ya MD  [address]  [place] [address]

## 2018-09-05 NOTE — MR AVS SNAPSHOT
After Visit Summary   9/5/2018    Jose Rucker    MRN: [id number]           Patient Information     Date Of Birth          2/4/1931        Visit Information        Provider Department      9/5/2018 3:45 PM Mode Ya MD [facility]        Today's Diagnoses     SSS (sick sinus syndrome) (H)        Cardiac pacemaker in situ        Paroxysmal atrial fibrillation (H)        Chronic systolic congestive heart failure (H)        Cardiomyopathy, unspecified type (H)           Follow-ups after your visit        Additional Services     Follow-Up with Cardiologist                 Your next 10 appointments already scheduled     Oct 08, 2018  4:15 PM CDT   Remote PPM Check with NELSON TECH1   [facility] ([facility])    [address]  [facility] [id number] [phone] OPT 2           This appointment is for a remote check of your pacemaker.  This is not an appointment at the office.              Future tests that were ordered for you today     Open Future Orders        Priority Expected Expires Ordered    Basic metabolic panel Routine 9/5/2019 9/6/2019 9/5/2018    EKG 12-lead complete w/read - Clinics (to be scheduled) Routine 9/5/2019 9/6/2019 9/5/2018    Follow-Up with Cardiologist Routine 9/5/2019 9/6/2019 9/5/2018    Echocardiogram Routine 9/5/2019 9/6/2019 9/5/2018    ECHO COMPLETE WITH OPTISON Routine 9/26/2018 12/2/2018 8/1/2017            Who to contact     If you have questions or need follow up information about today's clinic visit or your schedule please contact [facility] directly at [phone].  Normal or non-critical lab and imaging results will be communicated to you by MyChart, letter or phone within 4 business days after the clinic has received the results. If you do not hear from us within 7 days, please contact the clinic  "through Ymagis or phone. If you have a critical or abnormal lab result, we will notify you by phone as soon as possible.  Submit refill requests through Ymagis or call your pharmacy and they will forward the refill request to us. Please allow 3 business days for your refill to be completed.          Additional Information About Your Visit        Ymagis Information     Ymagis lets you send messages to your doctor, view your test results, renew your prescriptions, schedule appointments and more. To sign up, go to [email or web address] . Click on \"Log in\" on the left side of the screen, which will take you to the Welcome page. Then click on \"Sign up Now\" on the right side of the page.     You will be asked to enter the access code listed below, as well as some personal information. Please follow the directions to create your username and password.     Your access code is: [email or web address]  Expires: 2018 11:02 AM     Your access code will  in 90 days. If you need help or a new code, please call your [facility] clinic or [phone].        Care EveryWhere ID     This is your Care EveryWhere ID. This could be used by other organizations to access your [facility] medical records  [id number]        Your Vitals Were     Pulse Height BMI (Body Mass Index)             68 1.778 m (5' 10\") 25.55 kg/m2          Blood Pressure from Last 3 Encounters:   18 110/56   17 132/70   16 130/72    Weight from Last 3 Encounters:   18 80.8 kg (178 lb 1.6 oz)   18 82.6 kg (182 lb)   17 82.6 kg (182 lb)              We Performed the Following     EKG 12-lead complete w/read - Clinics (performed today)     Follow-Up with Cardiologist          Today's Medication Changes          These changes are accurate as of 18  4:20 PM.  If you have any questions, ask your nurse or doctor.               These medicines have changed or have updated prescriptions.        Dose/Directions    metoprolol " succinate 50 MG 24 hr tablet   Commonly known as:  TOPROL-XL   This may have changed:    - how much to take  - how to take this  - when to take this  - additional instructions   Used for:  Paroxysmal atrial fibrillation (H)   Changed by:  Mode Ya MD        Dose:  50 mg   Take 1 tablet (50 mg) by mouth 2 times daily   Quantity:  180 tablet   Refills:  3            Where to get your medicines      These medications were sent to [facility] - [place], [place] - [address] [place] [address] [address]  [address] [facility] [address], [place] [address]     Phone:  [phone]     metoprolol succinate 50 MG 24 hr tablet                Primary Care Provider Office Phone # Fax #    Cathy LopezSUSAN mckeon [phone]       [facility] [address] [facility] [address]        Equal Access to Services     [facility]: Hadii aad ku hadasho Soomaali, waaxda luqadaha, qaybta kaalmada adeegyada, waxay idiin hayaan ademadelyn estrada . So [facility] [phone].    ATENCIÓN: Si habla español, tiene a varela disposición servicios gratuitos de asistencia lingüística. [place] al [phone].    We comply with applicable federal civil rights laws and [place] laws. We do not discriminate on the basis of race, color, national origin, age, disability, sex, sexual orientation, or gender identity.            Thank you!     Thank you for choosing [facility] HEART [facility]  for your care. Our goal is always to provide you with excellent care. Hearing back from our patients is one way we can continue to improve our services. Please take a few minutes to complete the written survey that you may receive in the mail after your visit with us. Thank you!             Your Updated Medication List - Protect others around you: Learn how to safely use, store and throw away your medicines at www.disposemymeds.org.          This list is accurate as of 9/5/18  4:20 PM.  Always  use your most recent med list.                   Brand Name Dispense Instructions for use Diagnosis    apixaban ANTICOAGULANT 5 MG tablet    ELIQUIS    180 tablet    Take 1 tablet (5 mg) by mouth 2 times daily    A-fib (H)       furosemide 40 MG tablet    LASIX    90 tablet    Take 1 tablet (40 mg) by mouth daily    Shortness of breath       lisinopril 10 MG tablet    PRINIVIL/ZESTRIL    90 tablet    Take 1 tablet (10 mg) by mouth daily    Essential hypertension, benign       metoprolol succinate 50 MG 24 hr tablet    TOPROL-XL    180 tablet    Take 1 tablet (50 mg) by mouth 2 times daily    Paroxysmal atrial fibrillation (H)       PROSTEON PO      Take 500 mg by mouth 2 tablets twice daily        simvastatin 20 MG tablet    ZOCOR     Take 20 mg by mouth At Bedtime        TYLENOL PM EXTRA STRENGTH PO      Take 500 mg by mouth daily

## 2018-09-05 NOTE — LETTER
9/5/2018      Cathy Jones NP  [facility] [address]  [facility] [address]      RE: Jose Colinonnell       Dear Colleague,    I had the pleasure of seeing Jose Rucker in the [facility].    Service Date: 09/05/2018      REFERRING PHYSICIAN:  Cathy Jones NP      HISTORY OF PRESENT ILLNESS:  It has been my pleasure to see your patient, Jose Rucker, in followup.  He is a very pleasant 87-year-old gentleman with a history of sick sinus syndrome and a mild cardiomyopathy with an EF of 40%-45%.  He was previously seen by my partner, Dr. Radames Jewell.  Nuclear stress testing showed no evidence of ischemia.  This patient has paroxysmal atrial fibrillation and had a dual-chamber pacemaker implanted for the sick sinus syndrome.  He does have a low atrial fibrillation burden.  Interrogation of his pacemaker on 06/25/2018 showed 141 mode switches.  The longest episode lasted for 10 minutes with the ventricular rate between 100 and 140.  Fortunately, his atrial fibrillation burden is only 0.2%.  He is entirely asymptomatic.  Echocardiography which was performed today shows that his ejection fraction is normal at 55%-60%.  No significant valvular heart disease is present.  He does have mild to moderate dilatation of the aortic root, which measures 4.4 cm.  The ascending aorta is mildly dilated at 3.9 cm.  EKG today shows that the patient is atrially paced and ventricularly sensed.  He does have a right bundle branch block.  Nonspecific ST changes are present in the inferior leads.  His blood pressure is normal at 110/56.      IMPRESSION:   1.  Paroxysmal atrial fibrillation.  His atrial fibrillation burden percentage is low at 0.2%.  However, when he does go into atrial fibrillation, his ventricular rate can be on the higher side between 100 and 140 beats per minute.  He is asymptomatic with this, however.  He is anticoagulated with apixaban without complication.    2.  Cardiomyopathy.  His ejection fraction has returned to normal on medical therapy.   3.  Mildly to moderately dilated sinuses of Valsalva and mildly dilated ascending aorta.   4.  Normotensive.      PLAN:  I will try to increase the dose of metoprolol succinate to 50 mg twice a day from his previous dosing, which was 50 mg in the morning and 25 mg in the evening time.  However, I have warned the patient and his wife, if he starts to become dizzy and lightheaded or near-syncopal, he is to immediately cut the dose back to the 25 mg in the evening and 50 mg in the morning dose.  I am doing this to try and slow his heart rate down if he goes into atrial fibrillation.  I will see him back again in a year, and I will repeat his EKG at that time.  We will also repeat his basic metabolic profile at that stage.  As always, he has been told to contact me if he has any questions or any concerns.      It has been my pleasure to be involved in the care of this very nice patient.      Mode Ya MD, FACC       cc:   Cathy Jones NP   [facility] [address] [place] [address]         MODE GREGG MD, FACC             D: 2018   T: 2018   MT: ELOISE      Name:     IDANIA BEDOLLA   MRN:      [id number]        Account:      [id number]   :      1931           Service Date: 2018      Document: [id number]         Outpatient Encounter Prescriptions as of 2018   Medication Sig Dispense Refill     apixaban ANTICOAGULANT (ELIQUIS) 5 MG tablet Take 1 tablet (5 mg) by mouth 2 times daily 180 tablet 3     Diphenhydramine-APAP, sleep, (TYLENOL PM EXTRA STRENGTH PO) Take 500 mg by mouth daily       furosemide (LASIX) 40 MG tablet Take 1 tablet (40 mg) by mouth daily 90 tablet 1     lisinopril (PRINIVIL/ZESTRIL) 10 MG tablet Take 1 tablet (10 mg) by mouth daily 90 tablet 2     metoprolol succinate (TOPROL-XL) 50 MG 24 hr tablet Take 1 tablet (50 mg) by mouth 2 times daily  180 tablet 3     Multiple Minerals-Vitamins (PROSTEON PO) Take 500 mg by mouth 2 tablets twice daily       simvastatin (ZOCOR) 20 MG tablet Take 20 mg by mouth At Bedtime       [DISCONTINUED] metoprolol (TOPROL-XL) 50 MG 24 hr tablet Take 1 tab every am and 1/2 tab every pm (total of 75 mg daily) 135 tablet 3     No facility-administered encounter medications on file as of 9/5/2018.        Again, thank you for allowing me to participate in the care of your patient.      Sincerely,    Mode Ya MD, MD     [facility]

## 2018-09-06 NOTE — PROGRESS NOTES
Service Date: 09/05/2018      REFERRING PHYSICIAN:  Cathy Jones NP      HISTORY OF PRESENT ILLNESS:  It has been my pleasure to see your patient, Jose Rucker, in followup.  He is a very pleasant 87-year-old gentleman with a history of sick sinus syndrome and a mild cardiomyopathy with an EF of 40%-45%.  He was previously seen by my partner, Dr. Radames Jewell.  Nuclear stress testing showed no evidence of ischemia.  This patient has paroxysmal atrial fibrillation and had a dual-chamber pacemaker implanted for the sick sinus syndrome.  He does have a low atrial fibrillation burden.  Interrogation of his pacemaker on 06/25/2018 showed 141 mode switches.  The longest episode lasted for 10 minutes with the ventricular rate between 100 and 140.  Fortunately, his atrial fibrillation burden is only 0.2%.  He is entirely asymptomatic.  Echocardiography which was performed today shows that his ejection fraction is normal at 55%-60%.  No significant valvular heart disease is present.  He does have mild to moderate dilatation of the aortic root, which measures 4.4 cm.  The ascending aorta is mildly dilated at 3.9 cm.  EKG today shows that the patient is atrially paced and ventricularly sensed.  He does have a right bundle branch block.  Nonspecific ST changes are present in the inferior leads.  His blood pressure is normal at 110/56.      IMPRESSION:   1.  Paroxysmal atrial fibrillation.  His atrial fibrillation burden percentage is low at 0.2%.  However, when he does go into atrial fibrillation, his ventricular rate can be on the higher side between 100 and 140 beats per minute.  He is asymptomatic with this, however.  He is anticoagulated with apixaban without complication.   2.  Cardiomyopathy.  His ejection fraction has returned to normal on medical therapy.   3.  Mildly to moderately dilated sinuses of Valsalva and mildly dilated ascending aorta.   4.  Normotensive.      PLAN:  I will try to increase the dose of  metoprolol succinate to 50 mg twice a day from his previous dosing, which was 50 mg in the morning and 25 mg in the evening time.  However, I have warned the patient and his wife, if he starts to become dizzy and lightheaded or near-syncopal, he is to immediately cut the dose back to the 25 mg in the evening and 50 mg in the morning dose.  I am doing this to try and slow his heart rate down if he goes into atrial fibrillation.  I will see him back again in a year, and I will repeat his EKG at that time.  We will also repeat his basic metabolic profile at that stage.  As always, he has been told to contact me if he has any questions or any concerns.      It has been my pleasure to be involved in the care of this very nice patient.      Mode Ya MD, FACC       cc:   Cathy Jones NP   [facility] [address] [place] [address]         MODE GREGG MD, FACC             D: 2018   T: 2018   MT: ELOISE      Name:     IDANIA BEDOLLA   MRN:      [id number]        Account:      [id number]   :      1931           Service Date: 2018      Document: [id number]

## 2018-10-08 ENCOUNTER — ALLIED HEALTH/NURSE VISIT (OUTPATIENT)
Dept: CARDIOLOGY | Facility: CLINIC | Age: 83
End: 2018-10-08
Payer: COMMERCIAL

## 2018-10-08 DIAGNOSIS — Z95.0 CARDIAC PACEMAKER IN SITU: Primary | ICD-10-CM

## 2018-10-08 PROCEDURE — 93296 REM INTERROG EVL PM/IDS: CPT | Performed by: INTERNAL MEDICINE

## 2018-10-08 PROCEDURE — 93294 REM INTERROG EVL PM/LDLS PM: CPT | Performed by: INTERNAL MEDICINE

## 2018-10-08 NOTE — PROGRESS NOTES
Medtronic Adapta ADDRL1 (D) Remote PPM Device Check  AP: 99% : 0%  Mode: AAIR,=> DDDR        Presenting Rhythm: AP/VS  Heart Rate: adequate heart rates per histogram  Sensing: RA: not performed RV: stable    Pacing Threshold: stable    Impedance: stable  Battery Status: 10 - 13.5 years remaining  Atrial Arrhythmia: 4 mode switch episodes comprising <1% of the time. Longest episode lasted 18 min. Average vent rate while in mode switch is 110bpm. Taking Eliquis  Ventricular Arrhythmia: none     Care Plan: F/U [facility] q 3 months. Order in for annual OV to be scheduled in September 2019. Gave results and next transmission date over the phone to pt's wife. Dom MALINT

## 2018-10-08 NOTE — MR AVS SNAPSHOT
After Visit Summary   10/8/2018    Jose Rucker    MRN: [id number]           Patient Information     Date Of Birth          2/4/1931        Visit Information        Provider Department      10/8/2018 4:15 PM NELSON [address]        Today's Diagnoses     Cardiac pacemaker in situ    -  1       Follow-ups after your visit        Your next 10 appointments already scheduled     Oct 08, 2018  4:15 PM CDT   Remote PPM Check with NELSON TECH1   [facility] ([facility])    [address] [id number] [phone] OPT 2           This appointment is for a remote check of your pacemaker.  This is not an appointment at the office.            Randy 15, 2019 11:15 AM CST   Remote PPM Check with NELSON TECH1   [facility] ([facility])    [address] [id number] [phone] OPT 2           This appointment is for a remote check of your pacemaker.  This is not an appointment at the office.              Who to contact     If you have questions or need follow up information about today's clinic visit or your schedule please contact [facility] directly at [phone].  Normal or non-critical lab and imaging results will be communicated to you by MyChart, letter or phone within 4 business days after the clinic has received the results. If you do not hear from us within 7 days, please contact the clinic through Space Pencil[employer] or phone. If you have a critical or abnormal lab result, we will notify you by phone as soon as possible.  Submit refill requests through Fubles or call your pharmacy and they will forward the refill request to us. Please allow 3 business days for your refill to be completed.          Additional Information About Your Visit        [employer] Information     Fubles lets you send  "messages to your doctor, view your test results, renew your prescriptions, schedule appointments and more. To sign up, go to [email or web address] . Click on \"Log in\" on the left side of the screen, which will take you to the Welcome page. Then click on \"Sign up Now\" on the right side of the page.     You will be asked to enter the access code listed below, as well as some personal information. Please follow the directions to create your username and password.     Your access code is: [email or web address]  Expires: 2019  1:13 PM     Your access code will  in 90 days. If you need help or a new code, please call your [facility] clinic or [phone].        Care EveryWhere ID     This is your Care EveryWhere ID. This could be used by other organizations to access your [facility] medical records  [id number]         Blood Pressure from Last 3 Encounters:   18 110/56   17 132/70   16 130/72    Weight from Last 3 Encounters:   18 80.8 kg (178 lb 1.6 oz)   18 82.6 kg (182 lb)   17 82.6 kg (182 lb)              We Performed the Following     INTERROGATION DEVICE EVAL REMOTE, PACER/ICD (77740)     PM DEVICE INTERROGATE REMOTE (31843)        Primary Care Provider Office Phone # Fax #    Cathy LopezSUSAN mckeon [phone]       [facility] [address] [facility] [address]        Equal Access to Services     [facility]: Hadii aad ku hadasho Soomaali, waaxda luqadaha, qaybta kaalmada adeegyada, paul estrada . So [facility] [phone].    ATENCIÓN: Si habla español, tiene a varela disposición servicios gratuitos de asistencia lingüística. Llame al [phone].    We comply with applicable federal civil rights laws and [place] laws. We do not discriminate on the basis of race, color, national origin, age, disability, sex, sexual orientation, or gender identity.            Thank you!     Thank you for choosing [facility] " [facility]   [place]  for your care. Our goal is always to provide you with excellent care. Hearing back from our patients is one way we can continue to improve our services. Please take a few minutes to complete the written survey that you may receive in the mail after your visit with us. Thank you!             Your Updated Medication List - Protect others around you: Learn how to safely use, store and throw away your medicines at www.disposemymeds.org.          This list is accurate as of 10/8/18  1:13 PM.  Always use your most recent med list.                   Brand Name Dispense Instructions for use Diagnosis    apixaban ANTICOAGULANT 5 MG tablet    ELIQUIS    180 tablet    Take 1 tablet (5 mg) by mouth 2 times daily    A-fib (H)       furosemide 40 MG tablet    LASIX    90 tablet    Take 1 tablet (40 mg) by mouth daily    Shortness of breath       lisinopril 10 MG tablet    PRINIVIL/ZESTRIL    90 tablet    Take 1 tablet (10 mg) by mouth daily    Essential hypertension, benign       metoprolol succinate 50 MG 24 hr tablet    TOPROL-XL    180 tablet    Take 1 tablet (50 mg) by mouth 2 times daily    Paroxysmal atrial fibrillation (H)       PROSTEON PO      Take 500 mg by mouth 2 tablets twice daily        simvastatin 20 MG tablet    ZOCOR     Take 20 mg by mouth At Bedtime        TYLENOL PM EXTRA STRENGTH PO      Take 500 mg by mouth daily

## 2019-01-15 ENCOUNTER — ANCILLARY PROCEDURE (OUTPATIENT)
Dept: CARDIOLOGY | Facility: CLINIC | Age: 84
End: 2019-01-15
Payer: MEDICARE

## 2019-01-15 DIAGNOSIS — I48.91 ATRIAL FIBRILLATION (H): ICD-10-CM

## 2019-01-15 PROCEDURE — 93294 REM INTERROG EVL PM/LDLS PM: CPT | Performed by: INTERNAL MEDICINE

## 2019-03-27 ENCOUNTER — ANCILLARY PROCEDURE (OUTPATIENT)
Dept: CARDIOLOGY | Facility: CLINIC | Age: 84
End: 2019-03-27
Payer: MEDICARE

## 2019-03-27 DIAGNOSIS — Z95.0 CARDIAC PACEMAKER IN SITU: Primary | ICD-10-CM

## 2019-03-27 DIAGNOSIS — I48.91 ATRIAL FIBRILLATION (H): ICD-10-CM

## 2019-03-27 PROCEDURE — 93280 PM DEVICE PROGR EVAL DUAL: CPT | Performed by: INTERNAL MEDICINE

## 2019-04-05 LAB
MDC_IDC_LEAD_IMPLANT_DT: NORMAL
MDC_IDC_LEAD_IMPLANT_DT: NORMAL
MDC_IDC_LEAD_LOCATION: NORMAL
MDC_IDC_LEAD_LOCATION: NORMAL
MDC_IDC_LEAD_MFG: NORMAL
MDC_IDC_LEAD_MFG: NORMAL
MDC_IDC_LEAD_MODEL: NORMAL
MDC_IDC_LEAD_MODEL: NORMAL
MDC_IDC_LEAD_POLARITY_TYPE: NORMAL
MDC_IDC_LEAD_POLARITY_TYPE: NORMAL
MDC_IDC_LEAD_SERIAL: NORMAL
MDC_IDC_LEAD_SERIAL: NORMAL
MDC_IDC_MSMT_BATTERY_DTM: NORMAL
MDC_IDC_MSMT_BATTERY_IMPEDANCE: 163 OHM
MDC_IDC_MSMT_BATTERY_REMAINING_LONGEVITY: 138 MO
MDC_IDC_MSMT_BATTERY_STATUS: NORMAL
MDC_IDC_MSMT_BATTERY_VOLTAGE: 2.78 V
MDC_IDC_MSMT_LEADCHNL_RA_IMPEDANCE_VALUE: 386 OHM
MDC_IDC_MSMT_LEADCHNL_RA_PACING_THRESHOLD_AMPLITUDE: 0.38 V
MDC_IDC_MSMT_LEADCHNL_RA_PACING_THRESHOLD_AMPLITUDE: 0.5 V
MDC_IDC_MSMT_LEADCHNL_RA_PACING_THRESHOLD_PULSEWIDTH: 0.4 MS
MDC_IDC_MSMT_LEADCHNL_RA_PACING_THRESHOLD_PULSEWIDTH: 0.4 MS
MDC_IDC_MSMT_LEADCHNL_RA_SENSING_INTR_AMPL: 4 MV
MDC_IDC_MSMT_LEADCHNL_RV_IMPEDANCE_VALUE: 386 OHM
MDC_IDC_MSMT_LEADCHNL_RV_PACING_THRESHOLD_AMPLITUDE: 0.75 V
MDC_IDC_MSMT_LEADCHNL_RV_PACING_THRESHOLD_AMPLITUDE: 1.12 V
MDC_IDC_MSMT_LEADCHNL_RV_PACING_THRESHOLD_PULSEWIDTH: 0.4 MS
MDC_IDC_MSMT_LEADCHNL_RV_PACING_THRESHOLD_PULSEWIDTH: 0.4 MS
MDC_IDC_MSMT_LEADCHNL_RV_SENSING_INTR_AMPL: 11.2 MV
MDC_IDC_PG_IMPLANT_DTM: NORMAL
MDC_IDC_PG_MFG: NORMAL
MDC_IDC_PG_MODEL: NORMAL
MDC_IDC_PG_SERIAL: NORMAL
MDC_IDC_PG_TYPE: NORMAL
MDC_IDC_SESS_CLINIC_NAME: NORMAL
MDC_IDC_SESS_DTM: NORMAL
MDC_IDC_SESS_TYPE: NORMAL
MDC_IDC_SET_BRADY_AT_MODE_SWITCH_MODE: NORMAL
MDC_IDC_SET_BRADY_AT_MODE_SWITCH_RATE: 175 {BEATS}/MIN
MDC_IDC_SET_BRADY_LOWRATE: 60 {BEATS}/MIN
MDC_IDC_SET_BRADY_MAX_SENSOR_RATE: 130 {BEATS}/MIN
MDC_IDC_SET_BRADY_MAX_TRACKING_RATE: 130 {BEATS}/MIN
MDC_IDC_SET_BRADY_MODE: NORMAL
MDC_IDC_SET_BRADY_PAV_DELAY_LOW: 150 MS
MDC_IDC_SET_BRADY_SAV_DELAY_LOW: 120 MS
MDC_IDC_SET_LEADCHNL_RA_PACING_AMPLITUDE: 1.5 V
MDC_IDC_SET_LEADCHNL_RA_PACING_ANODE_ELECTRODE_1: NORMAL
MDC_IDC_SET_LEADCHNL_RA_PACING_ANODE_LOCATION_1: NORMAL
MDC_IDC_SET_LEADCHNL_RA_PACING_CAPTURE_MODE: NORMAL
MDC_IDC_SET_LEADCHNL_RA_PACING_CATHODE_ELECTRODE_1: NORMAL
MDC_IDC_SET_LEADCHNL_RA_PACING_CATHODE_LOCATION_1: NORMAL
MDC_IDC_SET_LEADCHNL_RA_PACING_POLARITY: NORMAL
MDC_IDC_SET_LEADCHNL_RA_PACING_PULSEWIDTH: 0.4 MS
MDC_IDC_SET_LEADCHNL_RA_SENSING_ANODE_ELECTRODE_1: NORMAL
MDC_IDC_SET_LEADCHNL_RA_SENSING_ANODE_LOCATION_1: NORMAL
MDC_IDC_SET_LEADCHNL_RA_SENSING_CATHODE_ELECTRODE_1: NORMAL
MDC_IDC_SET_LEADCHNL_RA_SENSING_CATHODE_LOCATION_1: NORMAL
MDC_IDC_SET_LEADCHNL_RA_SENSING_POLARITY: NORMAL
MDC_IDC_SET_LEADCHNL_RA_SENSING_SENSITIVITY: 0.5 MV
MDC_IDC_SET_LEADCHNL_RV_PACING_AMPLITUDE: 2.25 V
MDC_IDC_SET_LEADCHNL_RV_PACING_ANODE_ELECTRODE_1: NORMAL
MDC_IDC_SET_LEADCHNL_RV_PACING_ANODE_LOCATION_1: NORMAL
MDC_IDC_SET_LEADCHNL_RV_PACING_CAPTURE_MODE: NORMAL
MDC_IDC_SET_LEADCHNL_RV_PACING_CATHODE_ELECTRODE_1: NORMAL
MDC_IDC_SET_LEADCHNL_RV_PACING_CATHODE_LOCATION_1: NORMAL
MDC_IDC_SET_LEADCHNL_RV_PACING_POLARITY: NORMAL
MDC_IDC_SET_LEADCHNL_RV_PACING_PULSEWIDTH: 0.4 MS
MDC_IDC_SET_LEADCHNL_RV_SENSING_ANODE_ELECTRODE_1: NORMAL
MDC_IDC_SET_LEADCHNL_RV_SENSING_ANODE_LOCATION_1: NORMAL
MDC_IDC_SET_LEADCHNL_RV_SENSING_CATHODE_ELECTRODE_1: NORMAL
MDC_IDC_SET_LEADCHNL_RV_SENSING_CATHODE_LOCATION_1: NORMAL
MDC_IDC_SET_LEADCHNL_RV_SENSING_POLARITY: NORMAL
MDC_IDC_SET_LEADCHNL_RV_SENSING_SENSITIVITY: 5.6 MV
MDC_IDC_SET_ZONE_DETECTION_INTERVAL: 333.33 MS
MDC_IDC_SET_ZONE_DETECTION_INTERVAL: 342.86 MS
MDC_IDC_SET_ZONE_TYPE: NORMAL
MDC_IDC_SET_ZONE_TYPE: NORMAL
MDC_IDC_STAT_AT_BURDEN_PERCENT: 1.1 %
MDC_IDC_STAT_AT_DTM_END: NORMAL
MDC_IDC_STAT_AT_DTM_START: NORMAL
MDC_IDC_STAT_AT_MODE_SW_COUNT: 487
MDC_IDC_STAT_BRADY_AP_VP_PERCENT: 0 %
MDC_IDC_STAT_BRADY_AP_VS_PERCENT: 98 %
MDC_IDC_STAT_BRADY_AS_VP_PERCENT: 0 %
MDC_IDC_STAT_BRADY_AS_VS_PERCENT: 2 %
MDC_IDC_STAT_BRADY_DTM_END: NORMAL
MDC_IDC_STAT_BRADY_DTM_START: NORMAL
MDC_IDC_STAT_EPISODE_RECENT_COUNT: 0
MDC_IDC_STAT_EPISODE_RECENT_COUNT: 4903
MDC_IDC_STAT_EPISODE_RECENT_COUNT_DTM_END: NORMAL
MDC_IDC_STAT_EPISODE_RECENT_COUNT_DTM_END: NORMAL
MDC_IDC_STAT_EPISODE_RECENT_COUNT_DTM_START: NORMAL
MDC_IDC_STAT_EPISODE_RECENT_COUNT_DTM_START: NORMAL
MDC_IDC_STAT_EPISODE_TYPE: NORMAL
MDC_IDC_STAT_EPISODE_TYPE: NORMAL

## 2019-06-13 DIAGNOSIS — Z85.46 PERSONAL HISTORY OF MALIGNANT NEOPLASM OF PROSTATE: Primary | ICD-10-CM

## 2019-07-02 ENCOUNTER — ANCILLARY PROCEDURE (OUTPATIENT)
Dept: CARDIOLOGY | Facility: CLINIC | Age: 84
End: 2019-07-02
Attending: INTERNAL MEDICINE
Payer: MEDICARE

## 2019-07-02 DIAGNOSIS — Z95.0 CARDIAC PACEMAKER IN SITU: ICD-10-CM

## 2019-07-02 PROCEDURE — 93296 REM INTERROG EVL PM/IDS: CPT | Performed by: INTERNAL MEDICINE

## 2019-07-02 PROCEDURE — 93294 REM INTERROG EVL PM/LDLS PM: CPT | Performed by: INTERNAL MEDICINE

## 2019-07-10 LAB
MDC_IDC_LEAD_IMPLANT_DT: NORMAL
MDC_IDC_LEAD_IMPLANT_DT: NORMAL
MDC_IDC_LEAD_LOCATION: NORMAL
MDC_IDC_LEAD_LOCATION: NORMAL
MDC_IDC_LEAD_MFG: NORMAL
MDC_IDC_LEAD_MFG: NORMAL
MDC_IDC_LEAD_MODEL: NORMAL
MDC_IDC_LEAD_MODEL: NORMAL
MDC_IDC_LEAD_POLARITY_TYPE: NORMAL
MDC_IDC_LEAD_POLARITY_TYPE: NORMAL
MDC_IDC_LEAD_SERIAL: NORMAL
MDC_IDC_LEAD_SERIAL: NORMAL
MDC_IDC_MSMT_BATTERY_DTM: NORMAL
MDC_IDC_MSMT_BATTERY_IMPEDANCE: 163 OHM
MDC_IDC_MSMT_BATTERY_REMAINING_LONGEVITY: 138 MO
MDC_IDC_MSMT_BATTERY_STATUS: NORMAL
MDC_IDC_MSMT_BATTERY_VOLTAGE: 2.79 V
MDC_IDC_MSMT_LEADCHNL_RA_IMPEDANCE_VALUE: 386 OHM
MDC_IDC_MSMT_LEADCHNL_RA_PACING_THRESHOLD_AMPLITUDE: 0.5 V
MDC_IDC_MSMT_LEADCHNL_RA_PACING_THRESHOLD_PULSEWIDTH: 0.4 MS
MDC_IDC_MSMT_LEADCHNL_RV_IMPEDANCE_VALUE: 391 OHM
MDC_IDC_MSMT_LEADCHNL_RV_PACING_THRESHOLD_AMPLITUDE: 1.12 V
MDC_IDC_MSMT_LEADCHNL_RV_PACING_THRESHOLD_PULSEWIDTH: 0.4 MS
MDC_IDC_PG_IMPLANT_DTM: NORMAL
MDC_IDC_PG_MFG: NORMAL
MDC_IDC_PG_MODEL: NORMAL
MDC_IDC_PG_SERIAL: NORMAL
MDC_IDC_PG_TYPE: NORMAL
MDC_IDC_SESS_CLINIC_NAME: NORMAL
MDC_IDC_SESS_DTM: NORMAL
MDC_IDC_SESS_TYPE: NORMAL
MDC_IDC_SET_BRADY_AT_MODE_SWITCH_MODE: NORMAL
MDC_IDC_SET_BRADY_AT_MODE_SWITCH_RATE: 175 {BEATS}/MIN
MDC_IDC_SET_BRADY_LOWRATE: 60 {BEATS}/MIN
MDC_IDC_SET_BRADY_MAX_SENSOR_RATE: 130 {BEATS}/MIN
MDC_IDC_SET_BRADY_MAX_TRACKING_RATE: 130 {BEATS}/MIN
MDC_IDC_SET_BRADY_MODE: NORMAL
MDC_IDC_SET_BRADY_PAV_DELAY_LOW: 150 MS
MDC_IDC_SET_BRADY_SAV_DELAY_LOW: 120 MS
MDC_IDC_SET_LEADCHNL_RA_PACING_AMPLITUDE: 1.5 V
MDC_IDC_SET_LEADCHNL_RA_PACING_ANODE_ELECTRODE_1: NORMAL
MDC_IDC_SET_LEADCHNL_RA_PACING_ANODE_LOCATION_1: NORMAL
MDC_IDC_SET_LEADCHNL_RA_PACING_CAPTURE_MODE: NORMAL
MDC_IDC_SET_LEADCHNL_RA_PACING_CATHODE_ELECTRODE_1: NORMAL
MDC_IDC_SET_LEADCHNL_RA_PACING_CATHODE_LOCATION_1: NORMAL
MDC_IDC_SET_LEADCHNL_RA_PACING_POLARITY: NORMAL
MDC_IDC_SET_LEADCHNL_RA_PACING_PULSEWIDTH: 0.4 MS
MDC_IDC_SET_LEADCHNL_RA_SENSING_ANODE_ELECTRODE_1: NORMAL
MDC_IDC_SET_LEADCHNL_RA_SENSING_ANODE_LOCATION_1: NORMAL
MDC_IDC_SET_LEADCHNL_RA_SENSING_CATHODE_ELECTRODE_1: NORMAL
MDC_IDC_SET_LEADCHNL_RA_SENSING_CATHODE_LOCATION_1: NORMAL
MDC_IDC_SET_LEADCHNL_RA_SENSING_POLARITY: NORMAL
MDC_IDC_SET_LEADCHNL_RA_SENSING_SENSITIVITY: 0.35 MV
MDC_IDC_SET_LEADCHNL_RV_PACING_AMPLITUDE: 2.25 V
MDC_IDC_SET_LEADCHNL_RV_PACING_ANODE_ELECTRODE_1: NORMAL
MDC_IDC_SET_LEADCHNL_RV_PACING_ANODE_LOCATION_1: NORMAL
MDC_IDC_SET_LEADCHNL_RV_PACING_CAPTURE_MODE: NORMAL
MDC_IDC_SET_LEADCHNL_RV_PACING_CATHODE_ELECTRODE_1: NORMAL
MDC_IDC_SET_LEADCHNL_RV_PACING_CATHODE_LOCATION_1: NORMAL
MDC_IDC_SET_LEADCHNL_RV_PACING_POLARITY: NORMAL
MDC_IDC_SET_LEADCHNL_RV_PACING_PULSEWIDTH: 0.4 MS
MDC_IDC_SET_LEADCHNL_RV_SENSING_ANODE_ELECTRODE_1: NORMAL
MDC_IDC_SET_LEADCHNL_RV_SENSING_ANODE_LOCATION_1: NORMAL
MDC_IDC_SET_LEADCHNL_RV_SENSING_CATHODE_ELECTRODE_1: NORMAL
MDC_IDC_SET_LEADCHNL_RV_SENSING_CATHODE_LOCATION_1: NORMAL
MDC_IDC_SET_LEADCHNL_RV_SENSING_POLARITY: NORMAL
MDC_IDC_SET_LEADCHNL_RV_SENSING_SENSITIVITY: 5.6 MV
MDC_IDC_SET_ZONE_DETECTION_INTERVAL: 333.33 MS
MDC_IDC_SET_ZONE_DETECTION_INTERVAL: 342.86 MS
MDC_IDC_SET_ZONE_TYPE: NORMAL
MDC_IDC_SET_ZONE_TYPE: NORMAL
MDC_IDC_STAT_AT_BURDEN_PERCENT: 0.1 %
MDC_IDC_STAT_AT_DTM_END: NORMAL
MDC_IDC_STAT_AT_DTM_START: NORMAL
MDC_IDC_STAT_AT_MODE_SW_COUNT: 20
MDC_IDC_STAT_BRADY_AP_VP_PERCENT: 0 %
MDC_IDC_STAT_BRADY_AP_VS_PERCENT: 99 %
MDC_IDC_STAT_BRADY_AS_VP_PERCENT: 0 %
MDC_IDC_STAT_BRADY_AS_VS_PERCENT: 1 %
MDC_IDC_STAT_BRADY_DTM_END: NORMAL
MDC_IDC_STAT_BRADY_DTM_START: NORMAL
MDC_IDC_STAT_EPISODE_RECENT_COUNT: 0
MDC_IDC_STAT_EPISODE_RECENT_COUNT: 7
MDC_IDC_STAT_EPISODE_RECENT_COUNT_DTM_END: NORMAL
MDC_IDC_STAT_EPISODE_RECENT_COUNT_DTM_END: NORMAL
MDC_IDC_STAT_EPISODE_RECENT_COUNT_DTM_START: NORMAL
MDC_IDC_STAT_EPISODE_RECENT_COUNT_DTM_START: NORMAL
MDC_IDC_STAT_EPISODE_TYPE: NORMAL
MDC_IDC_STAT_EPISODE_TYPE: NORMAL

## 2019-07-26 ENCOUNTER — HOSPITAL ENCOUNTER (OUTPATIENT)
Dept: LAB | Facility: CLINIC | Age: 84
Discharge: HOME | End: 2019-07-26
Attending: UROLOGY | Admitting: UROLOGY
Payer: MEDICARE

## 2019-07-26 DIAGNOSIS — Z85.46 PERSONAL HISTORY OF MALIGNANT NEOPLASM OF PROSTATE: ICD-10-CM

## 2019-07-26 LAB — PSA SERPL-MCNC: <0.01 UG/L (ref 0–4)

## 2019-07-26 PROCEDURE — 84153 ASSAY OF PSA TOTAL: CPT | Performed by: UROLOGY

## 2019-07-26 PROCEDURE — 36415 COLL VENOUS BLD VENIPUNCTURE: CPT | Performed by: UROLOGY

## 2019-07-29 ENCOUNTER — OFFICE VISIT (OUTPATIENT)
Dept: UROLOGY | Facility: CLINIC | Age: 84
End: 2019-07-29
Payer: MEDICARE

## 2019-07-29 VITALS — BODY MASS INDEX: 25.76 KG/M2 | WEIGHT: 170 LBS | HEART RATE: 82 BPM | HEIGHT: 68 IN | OXYGEN SATURATION: 97 %

## 2019-07-29 DIAGNOSIS — Z85.46 PERSONAL HISTORY OF MALIGNANT NEOPLASM OF PROSTATE: Primary | ICD-10-CM

## 2019-07-29 PROCEDURE — 99213 OFFICE O/P EST LOW 20 MIN: CPT | Performed by: UROLOGY

## 2019-07-29 ASSESSMENT — PAIN SCALES - GENERAL: PAINLEVEL: NO PAIN (0)

## 2019-07-29 ASSESSMENT — MIFFLIN-ST. JEOR: SCORE: 1415.61

## 2019-07-29 NOTE — PROGRESS NOTES
"Office Visit Note  [facility]  [phone]    UROLOGIC DIAGNOSES:   [place] grade 10 prostate cancer    CURRENT INTERVENTIONS:   S/P Combined hormonal therapy and radiotherapy in     HISTORY:   Jose returns to clinic today for prostate cancer follow-up. He continues to have no urinary symptoms or complaints. His PSA remains undetectable.      PAST MEDICAL HISTORY:   Past Medical History:   Diagnosis Date     A-fib (H)      Bradycardia     PPM 2008     Hernia, abdominal      HTN (hypertension)      Palpitations      Primary cardiomyopathy (H) 2014     Problem list name updated by automated process. Provider to review     Sick sinus syndrome (H)      Syncope        PAST SURGICAL HISTORY:   Past Surgical History:   Procedure Laterality Date     CYSTOSCOPY       EP PPM GENERATOR CHANGE DUAL  2/10/16     HERNIA REPAIR       IMPLANT PACEMAKER  08    Medtronic Sensia, model# SEDR01, serial# [id number]     PROSTATE SURGERY         FAMILY HISTORY:   Family History   Problem Relation Age of Onset     Heart Disease Mother        SOCIAL HISTORY:   Social History     Tobacco Use     Smoking status: Former Smoker     Packs/day: 0.50     Years: 6.00     Pack years: 3.00     Types: Cigarettes     Last attempt to quit: 1956     Years since quittin.6     Smokeless tobacco: Never Used   Substance Use Topics     Alcohol use: Yes     Comment: 2 beers daily       Current Outpatient Medications   Medication     apixaban ANTICOAGULANT (ELIQUIS) 5 MG tablet     Diphenhydramine-APAP, sleep, (TYLENOL PM EXTRA STRENGTH PO)     furosemide (LASIX) 40 MG tablet     lisinopril (PRINIVIL/ZESTRIL) 10 MG tablet     metoprolol succinate (TOPROL-XL) 50 MG 24 hr tablet     Multiple Minerals-Vitamins (PROSTEON PO)     simvastatin (ZOCOR) 20 MG tablet     No current facility-administered medications for this visit.          PHYSICAL EXAM:    Pulse 82   Ht 1.727 m (5' 8\")   Wt 77.1 kg (170 lb)   SpO2 97%   BMI " 25.85 kg/m      Constitutional: Well developed. Conversant and in no acute distress  Eyes: Anicteric sclera, conjunctiva clear, normal extraocular movements  ENT: Normocephalic and atraumatic,   Skin: Warm and dry. No rashes or lesions  Cardiac: No peripheral edema  Back/Flank: Not done  CNS/PNS: Normal musculature and movements, moves all extremities normally  Respiratory: Normal non-labored breathing  Abdomen: Soft nontender and nondistended  Peripheral Vascular: No peripheral edema  Mental Status/Psych: Alert and Oriented x 3. Normal mood and affect    Penis: Not done  Scrotal Skin: Not done  Testicles: Not done  Epididymis: Not done  Digital Rectal Exam:     Cystoscopy: Not done    Imaging: None    Urinalysis: UA RESULTS:  No results for input(s): COLOR, APPEARANCE, URINEGLC, URINEBILI, URINEKETONE, SG, UBLD, URINEPH, PROTEIN, UROBILINOGEN, NITRITE, LEUKEST, RBCU, WBCU in the last 00503 hours.    PSA: undetectable    Post Void Residual:     Other labs: None today      IMPRESSION:  Doing well, PSA undetectable    PLAN:  He is doing very well, no more than 10 years out from prostate cancer treatment with no evidence of recurrence. I counseled him that it would be fine to wait 2 years until his next PSA check.      Boone Huerta M.D.

## 2019-07-29 NOTE — LETTER
2019       RE: Jose Rucker  [address] [place] [address]     Dear Colleague,    Thank you for referring your patient, Jose Rucker, to the [facility] at [facility]. Please see a copy of my visit note below.    Office Visit Note  [facility]  [phone]    UROLOGIC DIAGNOSES:   Mekhi grade 10 prostate cancer    CURRENT INTERVENTIONS:   S/P Combined hormonal therapy and radiotherapy in     HISTORY:   Jose returns to clinic today for prostate cancer follow-up. He continues to have no urinary symptoms or complaints. His PSA remains undetectable.      PAST MEDICAL HISTORY:   Past Medical History:   Diagnosis Date     A-fib (H)      Bradycardia     PPM 2008     Hernia, abdominal      HTN (hypertension)      Palpitations      Primary cardiomyopathy (H) 2014     Problem list name updated by automated process. Provider to review     Sick sinus syndrome (H)      Syncope        PAST SURGICAL HISTORY:   Past Surgical History:   Procedure Laterality Date     CYSTOSCOPY       EP PPM GENERATOR CHANGE DUAL  2/10/16     HERNIA REPAIR       IMPLANT PACEMAKER  08    Medtronic Sensia, model# SEDR01, serial# [id number]     PROSTATE SURGERY         FAMILY HISTORY:   Family History   Problem Relation Age of Onset     Heart Disease Mother        SOCIAL HISTORY:   Social History     Tobacco Use     Smoking status: Former Smoker     Packs/day: 0.50     Years: 6.00     Pack years: 3.00     Types: Cigarettes     Last attempt to quit: 1956     Years since quittin.6     Smokeless tobacco: Never Used   Substance Use Topics     Alcohol use: Yes     Comment: 2 beers daily       Current Outpatient Medications   Medication     apixaban ANTICOAGULANT (ELIQUIS) 5 MG tablet     Diphenhydramine-APAP, sleep, (TYLENOL PM EXTRA STRENGTH PO)     furosemide (LASIX) 40 MG tablet     lisinopril  "(PRINIVIL/ZESTRIL) 10 MG tablet     metoprolol succinate (TOPROL-XL) 50 MG 24 hr tablet     Multiple Minerals-Vitamins (PROSTEON PO)     simvastatin (ZOCOR) 20 MG tablet     No current facility-administered medications for this visit.          PHYSICAL EXAM:    Pulse 82   Ht 1.727 m (5' 8\")   Wt 77.1 kg (170 lb)   SpO2 97%   BMI 25.85 kg/m       Constitutional: Well developed. Conversant and in no acute distress  Eyes: Anicteric sclera, conjunctiva clear, normal extraocular movements  ENT: Normocephalic and atraumatic,   Skin: Warm and dry. No rashes or lesions  Cardiac: No peripheral edema  Back/Flank: Not done  CNS/PNS: Normal musculature and movements, moves all extremities normally  Respiratory: Normal non-labored breathing  Abdomen: Soft nontender and nondistended  Peripheral Vascular: No peripheral edema  Mental Status/Psych: Alert and Oriented x 3. Normal mood and affect    Penis: Not done  Scrotal Skin: Not done  Testicles: Not done  Epididymis: Not done  Digital Rectal Exam:     Cystoscopy: Not done    Imaging: None    Urinalysis: UA RESULTS:  No results for input(s): COLOR, APPEARANCE, URINEGLC, URINEBILI, URINEKETONE, SG, UBLD, URINEPH, PROTEIN, UROBILINOGEN, NITRITE, LEUKEST, RBCU, WBCU in the last 42093 hours.    PSA: undetectable    Post Void Residual:     Other labs: None today      IMPRESSION:  Doing well, PSA undetectable    PLAN:  He is doing very well, no more than 10 years out from prostate cancer treatment with no evidence of recurrence. I counseled him that it would be fine to wait 2 years until his next PSA check.      Boone Huerta M.D.                  "

## 2019-09-05 ENCOUNTER — HOSPITAL ENCOUNTER (OUTPATIENT)
Dept: CARDIOLOGY | Facility: CLINIC | Age: 84
Discharge: HOME | End: 2019-09-05
Attending: INTERNAL MEDICINE | Admitting: INTERNAL MEDICINE
Payer: MEDICARE

## 2019-09-05 DIAGNOSIS — I50.22 CHRONIC SYSTOLIC CONGESTIVE HEART FAILURE (H): ICD-10-CM

## 2019-09-05 DIAGNOSIS — I42.9 CARDIOMYOPATHY, UNSPECIFIED TYPE (H): ICD-10-CM

## 2019-09-05 LAB
ANION GAP SERPL CALCULATED.3IONS-SCNC: 2 MMOL/L (ref 3–14)
BUN SERPL-MCNC: 20 MG/DL (ref 7–30)
CALCIUM SERPL-MCNC: 9 MG/DL (ref 8.5–10.1)
CHLORIDE SERPL-SCNC: 108 MMOL/L (ref 94–109)
CO2 SERPL-SCNC: 28 MMOL/L (ref 20–32)
CREAT SERPL-MCNC: 1.06 MG/DL (ref 0.66–1.25)
GFR SERPL CREATININE-BSD FRML MDRD: 62 ML/MIN/{1.73_M2}
GLUCOSE SERPL-MCNC: 103 MG/DL (ref 70–99)
POTASSIUM SERPL-SCNC: 4.5 MMOL/L (ref 3.4–5.3)
SODIUM SERPL-SCNC: 138 MMOL/L (ref 133–144)

## 2019-09-05 PROCEDURE — 93306 TTE W/DOPPLER COMPLETE: CPT

## 2019-09-05 PROCEDURE — 36415 COLL VENOUS BLD VENIPUNCTURE: CPT | Performed by: INTERNAL MEDICINE

## 2019-09-05 PROCEDURE — 80048 BASIC METABOLIC PNL TOTAL CA: CPT | Performed by: INTERNAL MEDICINE

## 2019-09-05 PROCEDURE — 93306 TTE W/DOPPLER COMPLETE: CPT | Mod: 26 | Performed by: INTERNAL MEDICINE

## 2019-09-11 LAB
MDC_IDC_LEAD_IMPLANT_DT: NORMAL
MDC_IDC_LEAD_IMPLANT_DT: NORMAL
MDC_IDC_LEAD_LOCATION: NORMAL
MDC_IDC_LEAD_LOCATION: NORMAL
MDC_IDC_LEAD_MFG: NORMAL
MDC_IDC_LEAD_MFG: NORMAL
MDC_IDC_LEAD_MODEL: NORMAL
MDC_IDC_LEAD_MODEL: NORMAL
MDC_IDC_LEAD_POLARITY_TYPE: NORMAL
MDC_IDC_LEAD_POLARITY_TYPE: NORMAL
MDC_IDC_LEAD_SERIAL: NORMAL
MDC_IDC_LEAD_SERIAL: NORMAL
MDC_IDC_MSMT_BATTERY_DTM: NORMAL
MDC_IDC_MSMT_BATTERY_IMPEDANCE: 163 OHM
MDC_IDC_MSMT_BATTERY_REMAINING_LONGEVITY: 138 MO
MDC_IDC_MSMT_BATTERY_STATUS: NORMAL
MDC_IDC_MSMT_BATTERY_VOLTAGE: 2.78 V
MDC_IDC_MSMT_LEADCHNL_RA_IMPEDANCE_VALUE: 391 OHM
MDC_IDC_MSMT_LEADCHNL_RA_PACING_THRESHOLD_AMPLITUDE: 0.5 V
MDC_IDC_MSMT_LEADCHNL_RA_PACING_THRESHOLD_PULSEWIDTH: 0.4 MS
MDC_IDC_MSMT_LEADCHNL_RV_IMPEDANCE_VALUE: 415 OHM
MDC_IDC_MSMT_LEADCHNL_RV_PACING_THRESHOLD_AMPLITUDE: 1.12 V
MDC_IDC_MSMT_LEADCHNL_RV_PACING_THRESHOLD_PULSEWIDTH: 0.4 MS
MDC_IDC_PG_IMPLANT_DTM: NORMAL
MDC_IDC_PG_MFG: NORMAL
MDC_IDC_PG_MODEL: NORMAL
MDC_IDC_PG_SERIAL: NORMAL
MDC_IDC_PG_TYPE: NORMAL
MDC_IDC_SESS_CLINIC_NAME: NORMAL
MDC_IDC_SESS_DTM: NORMAL
MDC_IDC_SESS_TYPE: NORMAL
MDC_IDC_SET_BRADY_AT_MODE_SWITCH_MODE: NORMAL
MDC_IDC_SET_BRADY_AT_MODE_SWITCH_RATE: 175 {BEATS}/MIN
MDC_IDC_SET_BRADY_LOWRATE: 60 {BEATS}/MIN
MDC_IDC_SET_BRADY_MAX_SENSOR_RATE: 130 {BEATS}/MIN
MDC_IDC_SET_BRADY_MAX_TRACKING_RATE: 130 {BEATS}/MIN
MDC_IDC_SET_BRADY_MODE: NORMAL
MDC_IDC_SET_BRADY_PAV_DELAY_LOW: 150 MS
MDC_IDC_SET_BRADY_SAV_DELAY_LOW: 120 MS
MDC_IDC_SET_LEADCHNL_RA_PACING_AMPLITUDE: 1.5 V
MDC_IDC_SET_LEADCHNL_RA_PACING_ANODE_ELECTRODE_1: NORMAL
MDC_IDC_SET_LEADCHNL_RA_PACING_ANODE_LOCATION_1: NORMAL
MDC_IDC_SET_LEADCHNL_RA_PACING_CAPTURE_MODE: NORMAL
MDC_IDC_SET_LEADCHNL_RA_PACING_CATHODE_ELECTRODE_1: NORMAL
MDC_IDC_SET_LEADCHNL_RA_PACING_CATHODE_LOCATION_1: NORMAL
MDC_IDC_SET_LEADCHNL_RA_PACING_POLARITY: NORMAL
MDC_IDC_SET_LEADCHNL_RA_PACING_PULSEWIDTH: 0.4 MS
MDC_IDC_SET_LEADCHNL_RA_SENSING_ANODE_ELECTRODE_1: NORMAL
MDC_IDC_SET_LEADCHNL_RA_SENSING_ANODE_LOCATION_1: NORMAL
MDC_IDC_SET_LEADCHNL_RA_SENSING_CATHODE_ELECTRODE_1: NORMAL
MDC_IDC_SET_LEADCHNL_RA_SENSING_CATHODE_LOCATION_1: NORMAL
MDC_IDC_SET_LEADCHNL_RA_SENSING_POLARITY: NORMAL
MDC_IDC_SET_LEADCHNL_RA_SENSING_SENSITIVITY: 0.5 MV
MDC_IDC_SET_LEADCHNL_RV_PACING_AMPLITUDE: 2.25 V
MDC_IDC_SET_LEADCHNL_RV_PACING_ANODE_ELECTRODE_1: NORMAL
MDC_IDC_SET_LEADCHNL_RV_PACING_ANODE_LOCATION_1: NORMAL
MDC_IDC_SET_LEADCHNL_RV_PACING_CAPTURE_MODE: NORMAL
MDC_IDC_SET_LEADCHNL_RV_PACING_CATHODE_ELECTRODE_1: NORMAL
MDC_IDC_SET_LEADCHNL_RV_PACING_CATHODE_LOCATION_1: NORMAL
MDC_IDC_SET_LEADCHNL_RV_PACING_POLARITY: NORMAL
MDC_IDC_SET_LEADCHNL_RV_PACING_PULSEWIDTH: 0.4 MS
MDC_IDC_SET_LEADCHNL_RV_SENSING_ANODE_ELECTRODE_1: NORMAL
MDC_IDC_SET_LEADCHNL_RV_SENSING_ANODE_LOCATION_1: NORMAL
MDC_IDC_SET_LEADCHNL_RV_SENSING_CATHODE_ELECTRODE_1: NORMAL
MDC_IDC_SET_LEADCHNL_RV_SENSING_CATHODE_LOCATION_1: NORMAL
MDC_IDC_SET_LEADCHNL_RV_SENSING_POLARITY: NORMAL
MDC_IDC_SET_LEADCHNL_RV_SENSING_SENSITIVITY: 5.6 MV
MDC_IDC_SET_ZONE_DETECTION_INTERVAL: 333.33 MS
MDC_IDC_SET_ZONE_DETECTION_INTERVAL: 342.86 MS
MDC_IDC_SET_ZONE_TYPE: NORMAL
MDC_IDC_SET_ZONE_TYPE: NORMAL
MDC_IDC_STAT_AT_BURDEN_PERCENT: 0.5 %
MDC_IDC_STAT_AT_DTM_END: NORMAL
MDC_IDC_STAT_AT_DTM_START: NORMAL
MDC_IDC_STAT_AT_MODE_SW_COUNT: 131
MDC_IDC_STAT_BRADY_AP_VP_PERCENT: 0 %
MDC_IDC_STAT_BRADY_AP_VS_PERCENT: 99 %
MDC_IDC_STAT_BRADY_AS_VP_PERCENT: 0 %
MDC_IDC_STAT_BRADY_AS_VS_PERCENT: 1 %
MDC_IDC_STAT_BRADY_DTM_END: NORMAL
MDC_IDC_STAT_BRADY_DTM_START: NORMAL
MDC_IDC_STAT_EPISODE_RECENT_COUNT: 0
MDC_IDC_STAT_EPISODE_RECENT_COUNT: 1243
MDC_IDC_STAT_EPISODE_RECENT_COUNT_DTM_END: NORMAL
MDC_IDC_STAT_EPISODE_RECENT_COUNT_DTM_END: NORMAL
MDC_IDC_STAT_EPISODE_RECENT_COUNT_DTM_START: NORMAL
MDC_IDC_STAT_EPISODE_RECENT_COUNT_DTM_START: NORMAL
MDC_IDC_STAT_EPISODE_TYPE: NORMAL
MDC_IDC_STAT_EPISODE_TYPE: NORMAL

## 2019-09-18 ENCOUNTER — OFFICE VISIT (OUTPATIENT)
Dept: CARDIOLOGY | Facility: CLINIC | Age: 84
End: 2019-09-18
Payer: MEDICARE

## 2019-09-18 VITALS
HEART RATE: 68 BPM | HEIGHT: 68 IN | DIASTOLIC BLOOD PRESSURE: 76 MMHG | WEIGHT: 176.8 LBS | SYSTOLIC BLOOD PRESSURE: 128 MMHG | BODY MASS INDEX: 26.8 KG/M2

## 2019-09-18 DIAGNOSIS — R06.02 SHORTNESS OF BREATH: ICD-10-CM

## 2019-09-18 DIAGNOSIS — E78.5 HYPERLIPIDEMIA LDL GOAL <70: Primary | ICD-10-CM

## 2019-09-18 DIAGNOSIS — I42.9 CARDIOMYOPATHY, UNSPECIFIED TYPE (H): ICD-10-CM

## 2019-09-18 DIAGNOSIS — I10 ESSENTIAL HYPERTENSION, BENIGN: ICD-10-CM

## 2019-09-18 DIAGNOSIS — Z95.0 CARDIAC PACEMAKER IN SITU: ICD-10-CM

## 2019-09-18 DIAGNOSIS — I49.5 SSS (SICK SINUS SYNDROME) (H): ICD-10-CM

## 2019-09-18 DIAGNOSIS — I48.0 PAROXYSMAL ATRIAL FIBRILLATION (H): ICD-10-CM

## 2019-09-18 PROCEDURE — 99214 OFFICE O/P EST MOD 30 MIN: CPT | Performed by: INTERNAL MEDICINE

## 2019-09-18 PROCEDURE — 93000 ELECTROCARDIOGRAM COMPLETE: CPT | Performed by: INTERNAL MEDICINE

## 2019-09-18 RX ORDER — METOPROLOL SUCCINATE 50 MG/1
50 TABLET, EXTENDED RELEASE ORAL 2 TIMES DAILY
Qty: 180 TABLET | Refills: 3 | Status: SHIPPED | OUTPATIENT
Start: 2019-09-18 | End: 2020-11-10

## 2019-09-18 RX ORDER — FUROSEMIDE 40 MG
40 TABLET ORAL DAILY
Qty: 90 TABLET | Refills: 3 | Status: SHIPPED | OUTPATIENT
Start: 2019-09-18 | End: 2020-11-10

## 2019-09-18 RX ORDER — LISINOPRIL 10 MG/1
10 TABLET ORAL DAILY
Qty: 90 TABLET | Refills: 3 | Status: SHIPPED | OUTPATIENT
Start: 2019-09-18 | End: 2020-11-10

## 2019-09-18 RX ORDER — SIMVASTATIN 20 MG
20 TABLET ORAL AT BEDTIME
Qty: 90 TABLET | Refills: 3 | Status: SHIPPED | OUTPATIENT
Start: 2019-09-18 | End: 2020-11-25

## 2019-09-18 ASSESSMENT — MIFFLIN-ST. JEOR: SCORE: 1446.46

## 2019-09-18 NOTE — PROGRESS NOTES
HPI and Plan:   See dictation    Orders Placed This Encounter   Procedures     Basic metabolic panel     Lipid Profile     ALT     Follow-Up with Cardiologist     EKG 12-lead complete w/read - Clinics (to be scheduled)     Echocardiogram Complete       Orders Placed This Encounter   Medications     apixaban ANTICOAGULANT (ELIQUIS) 5 MG tablet     Sig: Take 1 tablet (5 mg) by mouth 2 times daily     Dispense:  180 tablet     Refill:  3     furosemide (LASIX) 40 MG tablet     Sig: Take 1 tablet (40 mg) by mouth daily     Dispense:  90 tablet     Refill:  3     lisinopril (PRINIVIL/ZESTRIL) 10 MG tablet     Sig: Take 1 tablet (10 mg) by mouth daily     Dispense:  90 tablet     Refill:  3     metoprolol succinate ER (TOPROL-XL) 50 MG 24 hr tablet     Sig: Take 1 tablet (50 mg) by mouth 2 times daily     Dispense:  180 tablet     Refill:  3     simvastatin (ZOCOR) 20 MG tablet     Sig: Take 1 tablet (20 mg) by mouth At Bedtime     Dispense:  90 tablet     Refill:  3       Medications Discontinued During This Encounter   Medication Reason     apixaban ANTICOAGULANT (ELIQUIS) 5 MG tablet Reorder     furosemide (LASIX) 40 MG tablet Reorder     lisinopril (PRINIVIL/ZESTRIL) 10 MG tablet Reorder     metoprolol succinate (TOPROL-XL) 50 MG 24 hr tablet Reorder     simvastatin (ZOCOR) 20 MG tablet Reorder         Encounter Diagnoses   Name Primary?     Paroxysmal atrial fibrillation (H)      Shortness of breath      Essential hypertension, benign      Hyperlipidemia LDL goal <70 Yes     Cardiomyopathy, unspecified type (H)      SSS (sick sinus syndrome) (H)      Cardiac pacemaker in situ        CURRENT MEDICATIONS:  Current Outpatient Medications   Medication Sig Dispense Refill     apixaban ANTICOAGULANT (ELIQUIS) 5 MG tablet Take 1 tablet (5 mg) by mouth 2 times daily 180 tablet 3     Diphenhydramine-APAP, sleep, (TYLENOL PM EXTRA STRENGTH PO) Take 500 mg by mouth daily       furosemide (LASIX) 40 MG tablet Take 1 tablet (40  mg) by mouth daily 90 tablet 3     lisinopril (PRINIVIL/ZESTRIL) 10 MG tablet Take 1 tablet (10 mg) by mouth daily 90 tablet 3     metoprolol succinate ER (TOPROL-XL) 50 MG 24 hr tablet Take 1 tablet (50 mg) by mouth 2 times daily 180 tablet 3     Multiple Minerals-Vitamins (PROSTEON PO) Take 500 mg by mouth 2 tablets twice daily       simvastatin (ZOCOR) 20 MG tablet Take 1 tablet (20 mg) by mouth At Bedtime 90 tablet 3       ALLERGIES     Allergies   Allergen Reactions     Penicillin G Rash     And patient had an awful smell        PAST MEDICAL HISTORY:  Past Medical History:   Diagnosis Date     A-fib (H)      Bradycardia     PPM 5/27/2008     Cardiac pacemaker in situ 9/17/2014 2008 PPM Medtronic Sensia, model# SEDR01, serial# [id number]       Heart murmur 5/23/2007    Overview:  Benign Echo     Hernia, abdominal      HTN (hypertension)      Hyperlipidemia with target LDL less than 130 5/23/2007    Overview:  ICD 10     Osteoarthritis of pelvic region 11/12/2008    Overview:  LW Modifier:  Rt, SHARON done 1/15/09 LW Onset:  5-6 years ; DJD Hip     Palpitations      Primary cardiomyopathy (H) 11/4/2014     Problem list name updated by automated process. Provider to review     Prostate cancer (H) 6/26/2007    Overview:  Mekhi Score:5+5=1 Radiation Therapy x 25 + Radioactive Seed Implant     Sick sinus syndrome (H)      Syncope        PAST SURGICAL HISTORY:  Past Surgical History:   Procedure Laterality Date     CYSTOSCOPY       EP PPM GENERATOR CHANGE DUAL  2/10/16     HERNIA REPAIR       IMPLANT PACEMAKER  5/7/08    Medtronic Sensia, model# SEDR01, serial# [id number]     PROSTATE SURGERY         FAMILY HISTORY:  Family History   Problem Relation Age of Onset     Heart Disease Mother        SOCIAL HISTORY:  Social History     Socioeconomic History     Marital status:      Spouse name: None     Number of children: None     Years of education: None     Highest education level: None   Occupational History      None   Social Needs     Financial resource strain: None     Food insecurity:     Worry: None     Inability: None     Transportation needs:     Medical: None     Non-medical: None   Tobacco Use     Smoking status: Former Smoker     Packs/day: 0.50     Years: 6.00     Pack years: 3.00     Types: Cigarettes     Last attempt to quit:      Years since quittin.7     Smokeless tobacco: Never Used   Substance and Sexual Activity     Alcohol use: Yes     Comment: 1 beer daily     Drug use: None     Sexual activity: None   Lifestyle     Physical activity:     Days per week: None     Minutes per session: None     Stress: None   Relationships     Social connections:     Talks on phone: None     Gets together: None     Attends Zoroastrianism service: None     Active member of club or organization: None     Attends meetings of clubs or organizations: None     Relationship status: None     Intimate partner violence:     Fear of current or ex partner: None     Emotionally abused: None     Physically abused: None     Forced sexual activity: None   Other Topics Concern      Service Not Asked     Blood Transfusions Not Asked     Caffeine Concern No     Comment: 2 cups daily     Occupational Exposure Not Asked     Hobby Hazards Not Asked     Sleep Concern Not Asked     Stress Concern Not Asked     Weight Concern Not Asked     Special Diet No     Back Care Not Asked     Exercise No     Comment: some stairs, yardwork, uses exercise ball     Bike Helmet Not Asked     Seat Belt Not Asked     Self-Exams Not Asked     Parent/sibling w/ CABG, MI or angioplasty before 65F 55M? Not Asked   Social History Narrative     None       Review of Systems:  Skin:  Negative       Eyes:  Positive for glasses cataract extraction of both eyes  ENT:  Positive for hearing loss does not wear hearing aids  Respiratory:  Negative       Cardiovascular:  Negative      Gastroenterology: Negative      Genitourinary:  Positive for   hx of prostate  "cancer  Musculoskeletal:  Positive for arthritis;nocturnal cramping hands / fingers /neck  Neurologic:  Positive for numbness or tingling of feet left hand  Psychiatric:  Positive for sleep disturbances    Heme/Lymph/Imm:  Negative      Endocrine:  Negative        Physical Exam:  Vitals: /76   Pulse 68   Ht 1.727 m (5' 8\")   Wt 80.2 kg (176 lb 12.8 oz)   BMI 26.88 kg/m      Constitutional:  cooperative, alert and oriented, well developed, well nourished, in no acute distress;cooperative overweight      Skin:  warm and dry to the touch, no apparent skin lesions or masses noted   pacemaker incision in the left infraclavicular area was well-healed      Head:  normocephalic, no masses or lesions        Eyes:  pupils equal and round, conjunctivae and lids unremarkable, sclera white, no xanthalasma, EOMS intact, no nystagmus        Lymph:      ENT:  no pallor or cyanosis, dentition good        Neck:  carotid pulses are full and equal bilaterally, JVP normal, no carotid bruit        Respiratory:  normal breath sounds, clear to auscultation, normal A-P diameter, normal symmetry, normal respiratory excursion, no use of accessory muscles (Occassional coarse crackles at right base.)       Cardiac: regular rhythm, normal S1/S2, no S3 or S4, apical impulse not displaced, no murmurs, gallops or rubs                pulses full and equal                                        GI:  not assessed this visit        Extremities and Muscular Skeletal:  no deformities, clubbing, cyanosis, erythema observed;no edema stasis pigmentation            Neurological:  no gross motor deficits;affect appropriate        Psych:  Alert and Oriented x 3        CC  Cathy Jones NP  [facility]  [address] [place] [address]              "

## 2019-09-18 NOTE — PROGRESS NOTES
Service Date: 09/18/2019      CARDIOLOGY FOLLOWUP VISIT      REFERRING PROVIDER:  Cathy Jones NP.      HISTORY OF PRESENT ILLNESS:  It is my pleasure to see your patient, Jose Rucker, who is a very pleasant 88-year-old gentleman who has a history of idiopathic mild cardiomyopathy, sick sinus syndrome and paroxysmal atrial fibrillation.  He also has a history of hyperlipidemia.  Since I last saw him, he has been doing well.  Echocardiography which was performed on 09/05, which is about 2 weeks ago, showed that his ejection fraction is now in the normal range with an EF of 55%-60%.  The left ventricular size is normal.  He does have mildly to moderately dilated aortic root dilatation at 4.4 cm, but the ascending aorta beyond that is normal in dimension.  No significant valvular heart disease is present, apart from trace to mild aortic regurgitation and trace pulmonary regurgitation, trace tricuspid regurgitation.      With respect to the atrial fibrillation, only 0.1% of the time is the patient in atrial fibrillation on interrogation of his pacemaker.  He did have 27 mode switches.  The longest episode was 2 hours with a heart rate between 98 and 157 beats per minute.  He does not feel the atrial fibrillation.  He is anticoagulated with apixaban without bleeding issues.      His blood pressure is well controlled at 128/76.  His renal function is normal, which is important because he is taking apixaban and at his age if he develops worsening renal function the dose of apixaban has to be adjusted.  About 2 weeks ago, his BUN was 20, creatinine was 1.06 and GFR was 62.  The sodium was 138 with a potassium of 4.5.      IMPRESSION:   1.  Cardiomyopathy.  His ejection fraction has returned to normal with an EF of 55%-60%.   2.  Essential hypertension.  His blood pressure is well controlled.   3.  Atrial fibrillation.  He is only in atrial fibrillation 0.1% of the time indicating a low atrial fibrillation burden.  He  is anticoagulated without bleeding issues with apixaban.   4.  Normally functioning permanent pacemaker.  EKG today shows the patient is atrially paced and ventricularly sensed.      PLAN:  We will continue the patient on his present medications.  I will see him back again in 1 year's time, but as always he has been told to contact us if he has any questions or any concerns.  We will repeat his echocardiogram at that stage to follow his cardiomyopathy, but also his aortic root size.  [facility] will follow his pacemaker independently.        As always, he has been told to contact us if he has any questions or any concerns.      cc:   Cathy Jones NP   [facility] [address] [place] [address]         ROSALVA GREGG MD, [facility]             D: 2019   T: 2019   MT: KESHA      Name:     IDANIA BEDOLLA   MRN:      [id number]        Account:      [id number]   :      1931           Service Date: 2019      Document: [id number]

## 2019-09-18 NOTE — LETTER
9/18/2019    Cathy Jones NP  [facility] [address]  [facility] [address]    RE: Jose Rucker       Dear Colleague,    I had the pleasure of seeing Jose Rucker in the [facility].    HPI and Plan:   See dictation    Orders Placed This Encounter   Procedures     Basic metabolic panel     Lipid Profile     ALT     Follow-Up with Cardiologist     EKG 12-lead complete w/read - Clinics (to be scheduled)     Echocardiogram Complete       Orders Placed This Encounter   Medications     apixaban ANTICOAGULANT (ELIQUIS) 5 MG tablet     Sig: Take 1 tablet (5 mg) by mouth 2 times daily     Dispense:  180 tablet     Refill:  3     furosemide (LASIX) 40 MG tablet     Sig: Take 1 tablet (40 mg) by mouth daily     Dispense:  90 tablet     Refill:  3     lisinopril (PRINIVIL/ZESTRIL) 10 MG tablet     Sig: Take 1 tablet (10 mg) by mouth daily     Dispense:  90 tablet     Refill:  3     metoprolol succinate ER (TOPROL-XL) 50 MG 24 hr tablet     Sig: Take 1 tablet (50 mg) by mouth 2 times daily     Dispense:  180 tablet     Refill:  3     simvastatin (ZOCOR) 20 MG tablet     Sig: Take 1 tablet (20 mg) by mouth At Bedtime     Dispense:  90 tablet     Refill:  3       Medications Discontinued During This Encounter   Medication Reason     apixaban ANTICOAGULANT (ELIQUIS) 5 MG tablet Reorder     furosemide (LASIX) 40 MG tablet Reorder     lisinopril (PRINIVIL/ZESTRIL) 10 MG tablet Reorder     metoprolol succinate (TOPROL-XL) 50 MG 24 hr tablet Reorder     simvastatin (ZOCOR) 20 MG tablet Reorder         Encounter Diagnoses   Name Primary?     Paroxysmal atrial fibrillation (H)      Shortness of breath      Essential hypertension, benign      Hyperlipidemia LDL goal <70 Yes     Cardiomyopathy, unspecified type (H)      SSS (sick sinus syndrome) (H)      Cardiac pacemaker in situ        CURRENT MEDICATIONS:  Current Outpatient Medications   Medication Sig Dispense Refill      apixaban ANTICOAGULANT (ELIQUIS) 5 MG tablet Take 1 tablet (5 mg) by mouth 2 times daily 180 tablet 3     Diphenhydramine-APAP, sleep, (TYLENOL PM EXTRA STRENGTH PO) Take 500 mg by mouth daily       furosemide (LASIX) 40 MG tablet Take 1 tablet (40 mg) by mouth daily 90 tablet 3     lisinopril (PRINIVIL/ZESTRIL) 10 MG tablet Take 1 tablet (10 mg) by mouth daily 90 tablet 3     metoprolol succinate ER (TOPROL-XL) 50 MG 24 hr tablet Take 1 tablet (50 mg) by mouth 2 times daily 180 tablet 3     Multiple Minerals-Vitamins (PROSTEON PO) Take 500 mg by mouth 2 tablets twice daily       simvastatin (ZOCOR) 20 MG tablet Take 1 tablet (20 mg) by mouth At Bedtime 90 tablet 3       ALLERGIES     Allergies   Allergen Reactions     Penicillin G Rash     And patient had an awful smell        PAST MEDICAL HISTORY:  Past Medical History:   Diagnosis Date     A-fib (H)      Bradycardia     PPM 5/27/2008     Cardiac pacemaker in situ 9/17/2014 2008 PPM Medtronic Sensia, model# SEDR01, serial# [id number]       Heart murmur 5/23/2007    Overview:  Benign Echo     Hernia, abdominal      HTN (hypertension)      Hyperlipidemia with target LDL less than 130 5/23/2007    Overview:  ICD 10     Osteoarthritis of pelvic region 11/12/2008    Overview:  LW Modifier:  Rt, SHARON done 1/15/09 LW Onset:  5-6 years ; DJD Hip     Palpitations      Primary cardiomyopathy (H) 11/4/2014     Problem list name updated by automated process. Provider to review     Prostate cancer (H) 6/26/2007    Overview:  Mekhi Score:5+5=1 Radiation Therapy x 25 + Radioactive Seed Implant     Sick sinus syndrome (H)      Syncope        PAST SURGICAL HISTORY:  Past Surgical History:   Procedure Laterality Date     CYSTOSCOPY       EP PPM GENERATOR CHANGE DUAL  2/10/16     HERNIA REPAIR       IMPLANT PACEMAKER  5/7/08    Medtronic Sensia, model# SEDR01, serial# [id number]     PROSTATE SURGERY         FAMILY HISTORY:  Family History   Problem Relation Age of Onset      Heart Disease Mother        SOCIAL HISTORY:  Social History     Socioeconomic History     Marital status:      Spouse name: None     Number of children: None     Years of education: None     Highest education level: None   Occupational History     None   Social Needs     Financial resource strain: None     Food insecurity:     Worry: None     Inability: None     Transportation needs:     Medical: None     Non-medical: None   Tobacco Use     Smoking status: Former Smoker     Packs/day: 0.50     Years: 6.00     Pack years: 3.00     Types: Cigarettes     Last attempt to quit:      Years since quittin.7     Smokeless tobacco: Never Used   Substance and Sexual Activity     Alcohol use: Yes     Comment: 1 beer daily     Drug use: None     Sexual activity: None   Lifestyle     Physical activity:     Days per week: None     Minutes per session: None     Stress: None   Relationships     Social connections:     Talks on phone: None     Gets together: None     Attends Anabaptist service: None     Active member of club or organization: None     Attends meetings of clubs or organizations: None     Relationship status: None     Intimate partner violence:     Fear of current or ex partner: None     Emotionally abused: None     Physically abused: None     Forced sexual activity: None   Other Topics Concern      Service Not Asked     Blood Transfusions Not Asked     Caffeine Concern No     Comment: 2 cups daily     Occupational Exposure Not Asked     Hobby Hazards Not Asked     Sleep Concern Not Asked     Stress Concern Not Asked     Weight Concern Not Asked     Special Diet No     Back Care Not Asked     Exercise No     Comment: some stairs, yardwork, uses exercise ball     Bike Helmet Not Asked     Seat Belt Not Asked     Self-Exams Not Asked     Parent/sibling w/ CABG, MI or angioplasty before 65F 55M? Not Asked   Social History Narrative     None       Review of Systems:  Skin:  Negative       Eyes:   "Positive for glasses cataract extraction of both eyes  ENT:  Positive for hearing loss does not wear hearing aids  Respiratory:  Negative       Cardiovascular:  Negative      Gastroenterology: Negative      Genitourinary:  Positive for   hx of prostate cancer  Musculoskeletal:  Positive for arthritis;nocturnal cramping hands / fingers /neck  Neurologic:  Positive for numbness or tingling of feet left hand  Psychiatric:  Positive for sleep disturbances    Heme/Lymph/Imm:  Negative      Endocrine:  Negative        Physical Exam:  Vitals: /76   Pulse 68   Ht 1.727 m (5' 8\")   Wt 80.2 kg (176 lb 12.8 oz)   BMI 26.88 kg/m       Constitutional:  cooperative, alert and oriented, well developed, well nourished, in no acute distress;cooperative overweight      Skin:  warm and dry to the touch, no apparent skin lesions or masses noted   pacemaker incision in the left infraclavicular area was well-healed      Head:  normocephalic, no masses or lesions        Eyes:  pupils equal and round, conjunctivae and lids unremarkable, sclera white, no xanthalasma, EOMS intact, no nystagmus        Lymph:      ENT:  no pallor or cyanosis, dentition good        Neck:  carotid pulses are full and equal bilaterally, JVP normal, no carotid bruit        Respiratory:  normal breath sounds, clear to auscultation, normal A-P diameter, normal symmetry, normal respiratory excursion, no use of accessory muscles (Occassional coarse crackles at right base.)       Cardiac: regular rhythm, normal S1/S2, no S3 or S4, apical impulse not displaced, no murmurs, gallops or rubs                pulses full and equal                                        GI:  not assessed this visit        Extremities and Muscular Skeletal:  no deformities, clubbing, cyanosis, erythema observed;no edema stasis pigmentation            Neurological:  no gross motor deficits;affect appropriate        Psych:  Alert and Oriented x 3        CC  Cathy Jones, " NP  [facility]  [address] [place] [address]                Thank you for allowing me to participate in the care of your patient.      Sincerely,     Mode Ya MD, MD     [facility]    cc:   Cathy Jones NP  [facility]  [address] [place] [address]

## 2019-09-18 NOTE — LETTER
9/18/2019      Cathy Jones NP  [facility] [address]  [facility] [address]      RE: Jose Agarwalell       Dear Colleague,    I had the pleasure of seeing Jose Rucker in the [facility].    Service Date: 09/18/2019      CARDIOLOGY FOLLOWUP VISIT      REFERRING PROVIDER:  Cathy Jones NP.      HISTORY OF PRESENT ILLNESS:  It is my pleasure to see your patient, Jose Rucker, who is a very pleasant 88-year-old gentleman who has a history of idiopathic mild cardiomyopathy, sick sinus syndrome and paroxysmal atrial fibrillation.  He also has a history of hyperlipidemia.  Since I last saw him, he has been doing well.  Echocardiography which was performed on 09/05, which is about 2 weeks ago, showed that his ejection fraction is now in the normal range with an EF of 55%-60%.  The left ventricular size is normal.  He does have mildly to moderately dilated aortic root dilatation at 4.4 cm, but the ascending aorta beyond that is normal in dimension.  No significant valvular heart disease is present, apart from trace to mild aortic regurgitation and trace pulmonary regurgitation, trace tricuspid regurgitation.      With respect to the atrial fibrillation, only 0.1% of the time is the patient in atrial fibrillation on interrogation of his pacemaker.  He did have 27 mode switches.  The longest episode was 2 hours with a heart rate between 98 and 157 beats per minute.  He does not feel the atrial fibrillation.  He is anticoagulated with apixaban without bleeding issues.      His blood pressure is well controlled at 128/76.  His renal function is normal, which is important because he is taking apixaban and at his age if he develops worsening renal function the dose of apixaban has to be adjusted.  About 2 weeks ago, his BUN was 20, creatinine was 1.06 and GFR was 62.  The sodium was 138 with a potassium of 4.5.      IMPRESSION:   1.  Cardiomyopathy.  His ejection  fraction has returned to normal with an EF of 55%-60%.   2.  Essential hypertension.  His blood pressure is well controlled.   3.  Atrial fibrillation.  He is only in atrial fibrillation 0.1% of the time indicating a low atrial fibrillation burden.  He is anticoagulated without bleeding issues with apixaban.   4.  Normally functioning permanent pacemaker.  EKG today shows the patient is atrially paced and ventricularly sensed.      PLAN:  We will continue the patient on his present medications.  I will see him back again in 1 year's time, but as always he has been told to contact us if he has any questions or any concerns.  We will repeat his echocardiogram at that stage to follow his cardiomyopathy, but also his aortic root size.  [facility] will follow his pacemaker independently.        As always, he has been told to contact us if he has any questions or any concerns.      cc:   Cathy Jones NP   [facility] [address] [place] [address]         ROSALVA GREGG MD, [facility]             D: 2019   T: 2019   MT: KESHA      Name:     IDANIA BEDOLLA   MRN:      [id number]        Account:      [id number]   :      1931           Service Date: 2019      Document: [id number]         Outpatient Encounter Medications as of 2019   Medication Sig Dispense Refill     apixaban ANTICOAGULANT (ELIQUIS) 5 MG tablet Take 1 tablet (5 mg) by mouth 2 times daily 180 tablet 3     Diphenhydramine-APAP, sleep, (TYLENOL PM EXTRA STRENGTH PO) Take 500 mg by mouth daily       furosemide (LASIX) 40 MG tablet Take 1 tablet (40 mg) by mouth daily 90 tablet 3     lisinopril (PRINIVIL/ZESTRIL) 10 MG tablet Take 1 tablet (10 mg) by mouth daily 90 tablet 3     metoprolol succinate ER (TOPROL-XL) 50 MG 24 hr tablet Take 1 tablet (50 mg) by mouth 2 times daily 180 tablet 3     Multiple Minerals-Vitamins (PROSTEON PO) Take 500 mg by mouth 2 tablets twice daily       simvastatin  (ZOCOR) 20 MG tablet Take 1 tablet (20 mg) by mouth At Bedtime 90 tablet 3     [DISCONTINUED] apixaban ANTICOAGULANT (ELIQUIS) 5 MG tablet Take 1 tablet (5 mg) by mouth 2 times daily 180 tablet 3     [DISCONTINUED] furosemide (LASIX) 40 MG tablet Take 1 tablet (40 mg) by mouth daily 90 tablet 1     [DISCONTINUED] lisinopril (PRINIVIL/ZESTRIL) 10 MG tablet Take 1 tablet (10 mg) by mouth daily 90 tablet 2     [DISCONTINUED] metoprolol succinate (TOPROL-XL) 50 MG 24 hr tablet Take 1 tablet (50 mg) by mouth 2 times daily 180 tablet 3     [DISCONTINUED] simvastatin (ZOCOR) 20 MG tablet Take 20 mg by mouth At Bedtime       No facility-administered encounter medications on file as of 9/18/2019.        Again, thank you for allowing me to participate in the care of your patient.      Sincerely,    Mode Ya MD, MD     [facility]

## 2019-10-10 ENCOUNTER — ANCILLARY PROCEDURE (OUTPATIENT)
Dept: CARDIOLOGY | Facility: CLINIC | Age: 84
End: 2019-10-10
Attending: INTERNAL MEDICINE
Payer: MEDICARE

## 2019-10-10 DIAGNOSIS — Z95.0 CARDIAC PACEMAKER IN SITU: ICD-10-CM

## 2019-10-10 PROCEDURE — 93296 REM INTERROG EVL PM/IDS: CPT | Performed by: INTERNAL MEDICINE

## 2019-10-10 PROCEDURE — 93294 REM INTERROG EVL PM/LDLS PM: CPT | Performed by: INTERNAL MEDICINE

## 2019-11-04 LAB
MDC_IDC_LEAD_IMPLANT_DT: NORMAL
MDC_IDC_LEAD_IMPLANT_DT: NORMAL
MDC_IDC_LEAD_LOCATION: NORMAL
MDC_IDC_LEAD_LOCATION: NORMAL
MDC_IDC_LEAD_MFG: NORMAL
MDC_IDC_LEAD_MFG: NORMAL
MDC_IDC_LEAD_MODEL: NORMAL
MDC_IDC_LEAD_MODEL: NORMAL
MDC_IDC_LEAD_POLARITY_TYPE: NORMAL
MDC_IDC_LEAD_POLARITY_TYPE: NORMAL
MDC_IDC_LEAD_SERIAL: NORMAL
MDC_IDC_LEAD_SERIAL: NORMAL
MDC_IDC_MSMT_BATTERY_DTM: NORMAL
MDC_IDC_MSMT_BATTERY_IMPEDANCE: 163 OHM
MDC_IDC_MSMT_BATTERY_REMAINING_LONGEVITY: 138 MO
MDC_IDC_MSMT_BATTERY_STATUS: NORMAL
MDC_IDC_MSMT_BATTERY_VOLTAGE: 2.78 V
MDC_IDC_MSMT_LEADCHNL_RA_IMPEDANCE_VALUE: 390 OHM
MDC_IDC_MSMT_LEADCHNL_RA_PACING_THRESHOLD_AMPLITUDE: 0.5 V
MDC_IDC_MSMT_LEADCHNL_RA_PACING_THRESHOLD_PULSEWIDTH: 0.4 MS
MDC_IDC_MSMT_LEADCHNL_RV_IMPEDANCE_VALUE: 391 OHM
MDC_IDC_MSMT_LEADCHNL_RV_PACING_THRESHOLD_AMPLITUDE: 1.12 V
MDC_IDC_MSMT_LEADCHNL_RV_PACING_THRESHOLD_PULSEWIDTH: 0.4 MS
MDC_IDC_PG_IMPLANT_DTM: NORMAL
MDC_IDC_PG_MFG: NORMAL
MDC_IDC_PG_MODEL: NORMAL
MDC_IDC_PG_SERIAL: NORMAL
MDC_IDC_PG_TYPE: NORMAL
MDC_IDC_SESS_CLINIC_NAME: NORMAL
MDC_IDC_SESS_DTM: NORMAL
MDC_IDC_SESS_TYPE: NORMAL
MDC_IDC_SET_BRADY_AT_MODE_SWITCH_MODE: NORMAL
MDC_IDC_SET_BRADY_AT_MODE_SWITCH_RATE: 175 {BEATS}/MIN
MDC_IDC_SET_BRADY_LOWRATE: 60 {BEATS}/MIN
MDC_IDC_SET_BRADY_MAX_SENSOR_RATE: 130 {BEATS}/MIN
MDC_IDC_SET_BRADY_MAX_TRACKING_RATE: 130 {BEATS}/MIN
MDC_IDC_SET_BRADY_MODE: NORMAL
MDC_IDC_SET_BRADY_PAV_DELAY_LOW: 150 MS
MDC_IDC_SET_BRADY_SAV_DELAY_LOW: 120 MS
MDC_IDC_SET_LEADCHNL_RA_PACING_AMPLITUDE: 1.5 V
MDC_IDC_SET_LEADCHNL_RA_PACING_ANODE_ELECTRODE_1: NORMAL
MDC_IDC_SET_LEADCHNL_RA_PACING_ANODE_LOCATION_1: NORMAL
MDC_IDC_SET_LEADCHNL_RA_PACING_CAPTURE_MODE: NORMAL
MDC_IDC_SET_LEADCHNL_RA_PACING_CATHODE_ELECTRODE_1: NORMAL
MDC_IDC_SET_LEADCHNL_RA_PACING_CATHODE_LOCATION_1: NORMAL
MDC_IDC_SET_LEADCHNL_RA_PACING_POLARITY: NORMAL
MDC_IDC_SET_LEADCHNL_RA_PACING_PULSEWIDTH: 0.4 MS
MDC_IDC_SET_LEADCHNL_RA_SENSING_ANODE_ELECTRODE_1: NORMAL
MDC_IDC_SET_LEADCHNL_RA_SENSING_ANODE_LOCATION_1: NORMAL
MDC_IDC_SET_LEADCHNL_RA_SENSING_CATHODE_ELECTRODE_1: NORMAL
MDC_IDC_SET_LEADCHNL_RA_SENSING_CATHODE_LOCATION_1: NORMAL
MDC_IDC_SET_LEADCHNL_RA_SENSING_POLARITY: NORMAL
MDC_IDC_SET_LEADCHNL_RA_SENSING_SENSITIVITY: 0.5 MV
MDC_IDC_SET_LEADCHNL_RV_PACING_AMPLITUDE: 2.25 V
MDC_IDC_SET_LEADCHNL_RV_PACING_ANODE_ELECTRODE_1: NORMAL
MDC_IDC_SET_LEADCHNL_RV_PACING_ANODE_LOCATION_1: NORMAL
MDC_IDC_SET_LEADCHNL_RV_PACING_CAPTURE_MODE: NORMAL
MDC_IDC_SET_LEADCHNL_RV_PACING_CATHODE_ELECTRODE_1: NORMAL
MDC_IDC_SET_LEADCHNL_RV_PACING_CATHODE_LOCATION_1: NORMAL
MDC_IDC_SET_LEADCHNL_RV_PACING_POLARITY: NORMAL
MDC_IDC_SET_LEADCHNL_RV_PACING_PULSEWIDTH: 0.4 MS
MDC_IDC_SET_LEADCHNL_RV_SENSING_ANODE_ELECTRODE_1: NORMAL
MDC_IDC_SET_LEADCHNL_RV_SENSING_ANODE_LOCATION_1: NORMAL
MDC_IDC_SET_LEADCHNL_RV_SENSING_CATHODE_ELECTRODE_1: NORMAL
MDC_IDC_SET_LEADCHNL_RV_SENSING_CATHODE_LOCATION_1: NORMAL
MDC_IDC_SET_LEADCHNL_RV_SENSING_POLARITY: NORMAL
MDC_IDC_SET_LEADCHNL_RV_SENSING_SENSITIVITY: 5.6 MV
MDC_IDC_SET_ZONE_DETECTION_INTERVAL: 333.33 MS
MDC_IDC_SET_ZONE_DETECTION_INTERVAL: 342.86 MS
MDC_IDC_SET_ZONE_TYPE: NORMAL
MDC_IDC_SET_ZONE_TYPE: NORMAL
MDC_IDC_STAT_AT_BURDEN_PERCENT: 0.1 %
MDC_IDC_STAT_AT_DTM_END: NORMAL
MDC_IDC_STAT_AT_DTM_START: NORMAL
MDC_IDC_STAT_AT_MODE_SW_COUNT: 35
MDC_IDC_STAT_BRADY_AP_VP_PERCENT: 0 %
MDC_IDC_STAT_BRADY_AP_VS_PERCENT: 99 %
MDC_IDC_STAT_BRADY_AS_VP_PERCENT: 0 %
MDC_IDC_STAT_BRADY_AS_VS_PERCENT: 1 %
MDC_IDC_STAT_BRADY_DTM_END: NORMAL
MDC_IDC_STAT_BRADY_DTM_START: NORMAL
MDC_IDC_STAT_EPISODE_RECENT_COUNT: 0
MDC_IDC_STAT_EPISODE_RECENT_COUNT: 10
MDC_IDC_STAT_EPISODE_RECENT_COUNT_DTM_END: NORMAL
MDC_IDC_STAT_EPISODE_RECENT_COUNT_DTM_END: NORMAL
MDC_IDC_STAT_EPISODE_RECENT_COUNT_DTM_START: NORMAL
MDC_IDC_STAT_EPISODE_RECENT_COUNT_DTM_START: NORMAL
MDC_IDC_STAT_EPISODE_TYPE: NORMAL
MDC_IDC_STAT_EPISODE_TYPE: NORMAL

## 2020-01-17 ENCOUNTER — ANCILLARY PROCEDURE (OUTPATIENT)
Dept: CARDIOLOGY | Facility: CLINIC | Age: 85
End: 2020-01-17
Attending: INTERNAL MEDICINE
Payer: MEDICARE

## 2020-01-17 DIAGNOSIS — Z95.0 CARDIAC PACEMAKER IN SITU: ICD-10-CM

## 2020-01-17 PROCEDURE — 93294 REM INTERROG EVL PM/LDLS PM: CPT | Performed by: INTERNAL MEDICINE

## 2020-01-17 PROCEDURE — 93296 REM INTERROG EVL PM/IDS: CPT | Performed by: INTERNAL MEDICINE

## 2020-01-27 LAB
MDC_IDC_LEAD_IMPLANT_DT: NORMAL
MDC_IDC_LEAD_IMPLANT_DT: NORMAL
MDC_IDC_LEAD_LOCATION: NORMAL
MDC_IDC_LEAD_LOCATION: NORMAL
MDC_IDC_LEAD_MFG: NORMAL
MDC_IDC_LEAD_MFG: NORMAL
MDC_IDC_LEAD_MODEL: NORMAL
MDC_IDC_LEAD_MODEL: NORMAL
MDC_IDC_LEAD_POLARITY_TYPE: NORMAL
MDC_IDC_LEAD_POLARITY_TYPE: NORMAL
MDC_IDC_LEAD_SERIAL: NORMAL
MDC_IDC_LEAD_SERIAL: NORMAL
MDC_IDC_MSMT_BATTERY_DTM: NORMAL
MDC_IDC_MSMT_BATTERY_IMPEDANCE: 187 OHM
MDC_IDC_MSMT_BATTERY_REMAINING_LONGEVITY: 134 MO
MDC_IDC_MSMT_BATTERY_STATUS: NORMAL
MDC_IDC_MSMT_BATTERY_VOLTAGE: 2.78 V
MDC_IDC_MSMT_LEADCHNL_RA_IMPEDANCE_VALUE: 391 OHM
MDC_IDC_MSMT_LEADCHNL_RA_PACING_THRESHOLD_AMPLITUDE: 0.5 V
MDC_IDC_MSMT_LEADCHNL_RA_PACING_THRESHOLD_PULSEWIDTH: 0.4 MS
MDC_IDC_MSMT_LEADCHNL_RV_IMPEDANCE_VALUE: 398 OHM
MDC_IDC_MSMT_LEADCHNL_RV_PACING_THRESHOLD_AMPLITUDE: 0.88 V
MDC_IDC_MSMT_LEADCHNL_RV_PACING_THRESHOLD_PULSEWIDTH: 0.4 MS
MDC_IDC_PG_IMPLANT_DTM: NORMAL
MDC_IDC_PG_MFG: NORMAL
MDC_IDC_PG_MODEL: NORMAL
MDC_IDC_PG_SERIAL: NORMAL
MDC_IDC_PG_TYPE: NORMAL
MDC_IDC_SESS_CLINIC_NAME: NORMAL
MDC_IDC_SESS_DTM: NORMAL
MDC_IDC_SESS_TYPE: NORMAL
MDC_IDC_SET_BRADY_AT_MODE_SWITCH_MODE: NORMAL
MDC_IDC_SET_BRADY_AT_MODE_SWITCH_RATE: 175 {BEATS}/MIN
MDC_IDC_SET_BRADY_LOWRATE: 60 {BEATS}/MIN
MDC_IDC_SET_BRADY_MAX_SENSOR_RATE: 130 {BEATS}/MIN
MDC_IDC_SET_BRADY_MAX_TRACKING_RATE: 130 {BEATS}/MIN
MDC_IDC_SET_BRADY_MODE: NORMAL
MDC_IDC_SET_BRADY_PAV_DELAY_LOW: 150 MS
MDC_IDC_SET_BRADY_SAV_DELAY_LOW: 120 MS
MDC_IDC_SET_LEADCHNL_RA_PACING_AMPLITUDE: 1.5 V
MDC_IDC_SET_LEADCHNL_RA_PACING_ANODE_ELECTRODE_1: NORMAL
MDC_IDC_SET_LEADCHNL_RA_PACING_ANODE_LOCATION_1: NORMAL
MDC_IDC_SET_LEADCHNL_RA_PACING_CAPTURE_MODE: NORMAL
MDC_IDC_SET_LEADCHNL_RA_PACING_CATHODE_ELECTRODE_1: NORMAL
MDC_IDC_SET_LEADCHNL_RA_PACING_CATHODE_LOCATION_1: NORMAL
MDC_IDC_SET_LEADCHNL_RA_PACING_POLARITY: NORMAL
MDC_IDC_SET_LEADCHNL_RA_PACING_PULSEWIDTH: 0.4 MS
MDC_IDC_SET_LEADCHNL_RA_SENSING_ANODE_ELECTRODE_1: NORMAL
MDC_IDC_SET_LEADCHNL_RA_SENSING_ANODE_LOCATION_1: NORMAL
MDC_IDC_SET_LEADCHNL_RA_SENSING_CATHODE_ELECTRODE_1: NORMAL
MDC_IDC_SET_LEADCHNL_RA_SENSING_CATHODE_LOCATION_1: NORMAL
MDC_IDC_SET_LEADCHNL_RA_SENSING_POLARITY: NORMAL
MDC_IDC_SET_LEADCHNL_RA_SENSING_SENSITIVITY: 0.5 MV
MDC_IDC_SET_LEADCHNL_RV_PACING_AMPLITUDE: 2 V
MDC_IDC_SET_LEADCHNL_RV_PACING_ANODE_ELECTRODE_1: NORMAL
MDC_IDC_SET_LEADCHNL_RV_PACING_ANODE_LOCATION_1: NORMAL
MDC_IDC_SET_LEADCHNL_RV_PACING_CAPTURE_MODE: NORMAL
MDC_IDC_SET_LEADCHNL_RV_PACING_CATHODE_ELECTRODE_1: NORMAL
MDC_IDC_SET_LEADCHNL_RV_PACING_CATHODE_LOCATION_1: NORMAL
MDC_IDC_SET_LEADCHNL_RV_PACING_POLARITY: NORMAL
MDC_IDC_SET_LEADCHNL_RV_PACING_PULSEWIDTH: 0.4 MS
MDC_IDC_SET_LEADCHNL_RV_SENSING_ANODE_ELECTRODE_1: NORMAL
MDC_IDC_SET_LEADCHNL_RV_SENSING_ANODE_LOCATION_1: NORMAL
MDC_IDC_SET_LEADCHNL_RV_SENSING_CATHODE_ELECTRODE_1: NORMAL
MDC_IDC_SET_LEADCHNL_RV_SENSING_CATHODE_LOCATION_1: NORMAL
MDC_IDC_SET_LEADCHNL_RV_SENSING_POLARITY: NORMAL
MDC_IDC_SET_LEADCHNL_RV_SENSING_SENSITIVITY: 5.6 MV
MDC_IDC_SET_ZONE_DETECTION_INTERVAL: 333.33 MS
MDC_IDC_SET_ZONE_DETECTION_INTERVAL: 342.86 MS
MDC_IDC_SET_ZONE_TYPE: NORMAL
MDC_IDC_SET_ZONE_TYPE: NORMAL
MDC_IDC_STAT_AT_BURDEN_PERCENT: 0.2 %
MDC_IDC_STAT_AT_DTM_END: NORMAL
MDC_IDC_STAT_AT_DTM_START: NORMAL
MDC_IDC_STAT_AT_MODE_SW_COUNT: 80
MDC_IDC_STAT_BRADY_AP_VP_PERCENT: 0 %
MDC_IDC_STAT_BRADY_AP_VS_PERCENT: 99 %
MDC_IDC_STAT_BRADY_AS_VP_PERCENT: 0 %
MDC_IDC_STAT_BRADY_AS_VS_PERCENT: 1 %
MDC_IDC_STAT_BRADY_DTM_END: NORMAL
MDC_IDC_STAT_BRADY_DTM_START: NORMAL
MDC_IDC_STAT_EPISODE_RECENT_COUNT: 0
MDC_IDC_STAT_EPISODE_RECENT_COUNT: 39
MDC_IDC_STAT_EPISODE_RECENT_COUNT_DTM_END: NORMAL
MDC_IDC_STAT_EPISODE_RECENT_COUNT_DTM_END: NORMAL
MDC_IDC_STAT_EPISODE_RECENT_COUNT_DTM_START: NORMAL
MDC_IDC_STAT_EPISODE_RECENT_COUNT_DTM_START: NORMAL
MDC_IDC_STAT_EPISODE_TYPE: NORMAL
MDC_IDC_STAT_EPISODE_TYPE: NORMAL

## 2020-03-25 ENCOUNTER — TELEPHONE (OUTPATIENT)
Dept: CARDIOLOGY | Facility: CLINIC | Age: 85
End: 2020-03-25

## 2020-03-25 DIAGNOSIS — I49.5 SSS (SICK SINUS SYNDROME) (H): ICD-10-CM

## 2020-03-25 DIAGNOSIS — Z95.0 CARDIAC PACEMAKER IN SITU: Primary | ICD-10-CM

## 2020-03-25 NOTE — TELEPHONE ENCOUNTER
Called patient with update regarding changing in clinic device check to remote. Patient verbalized understanding. Ordered updated, and remote device check scheduled for 4/20/2020.

## 2020-04-20 ENCOUNTER — ANCILLARY PROCEDURE (OUTPATIENT)
Dept: CARDIOLOGY | Facility: CLINIC | Age: 85
End: 2020-04-20
Attending: INTERNAL MEDICINE
Payer: MEDICARE

## 2020-04-20 DIAGNOSIS — Z95.0 CARDIAC PACEMAKER IN SITU: ICD-10-CM

## 2020-04-20 DIAGNOSIS — I49.5 SSS (SICK SINUS SYNDROME) (H): ICD-10-CM

## 2020-04-20 LAB
MDC_IDC_LEAD_IMPLANT_DT: NORMAL
MDC_IDC_LEAD_IMPLANT_DT: NORMAL
MDC_IDC_LEAD_LOCATION: NORMAL
MDC_IDC_LEAD_LOCATION: NORMAL
MDC_IDC_LEAD_MFG: NORMAL
MDC_IDC_LEAD_MFG: NORMAL
MDC_IDC_LEAD_MODEL: NORMAL
MDC_IDC_LEAD_MODEL: NORMAL
MDC_IDC_LEAD_POLARITY_TYPE: NORMAL
MDC_IDC_LEAD_POLARITY_TYPE: NORMAL
MDC_IDC_LEAD_SERIAL: NORMAL
MDC_IDC_LEAD_SERIAL: NORMAL
MDC_IDC_MSMT_BATTERY_DTM: NORMAL
MDC_IDC_MSMT_BATTERY_IMPEDANCE: 212 OHM
MDC_IDC_MSMT_BATTERY_REMAINING_LONGEVITY: 129 MO
MDC_IDC_MSMT_BATTERY_STATUS: NORMAL
MDC_IDC_MSMT_BATTERY_VOLTAGE: 2.78 V
MDC_IDC_MSMT_LEADCHNL_RA_IMPEDANCE_VALUE: 386 OHM
MDC_IDC_MSMT_LEADCHNL_RA_PACING_THRESHOLD_AMPLITUDE: 0.5 V
MDC_IDC_MSMT_LEADCHNL_RA_PACING_THRESHOLD_PULSEWIDTH: 0.4 MS
MDC_IDC_MSMT_LEADCHNL_RV_IMPEDANCE_VALUE: 394 OHM
MDC_IDC_MSMT_LEADCHNL_RV_PACING_THRESHOLD_AMPLITUDE: 1 V
MDC_IDC_MSMT_LEADCHNL_RV_PACING_THRESHOLD_PULSEWIDTH: 0.4 MS
MDC_IDC_PG_IMPLANT_DTM: NORMAL
MDC_IDC_PG_MFG: NORMAL
MDC_IDC_PG_MODEL: NORMAL
MDC_IDC_PG_SERIAL: NORMAL
MDC_IDC_PG_TYPE: NORMAL
MDC_IDC_SESS_CLINIC_NAME: NORMAL
MDC_IDC_SESS_DTM: NORMAL
MDC_IDC_SESS_TYPE: NORMAL
MDC_IDC_SET_BRADY_AT_MODE_SWITCH_MODE: NORMAL
MDC_IDC_SET_BRADY_AT_MODE_SWITCH_RATE: 175 {BEATS}/MIN
MDC_IDC_SET_BRADY_LOWRATE: 60 {BEATS}/MIN
MDC_IDC_SET_BRADY_MAX_SENSOR_RATE: 130 {BEATS}/MIN
MDC_IDC_SET_BRADY_MAX_TRACKING_RATE: 130 {BEATS}/MIN
MDC_IDC_SET_BRADY_MODE: NORMAL
MDC_IDC_SET_BRADY_PAV_DELAY_LOW: 150 MS
MDC_IDC_SET_BRADY_SAV_DELAY_LOW: 120 MS
MDC_IDC_SET_LEADCHNL_RA_PACING_AMPLITUDE: 1.5 V
MDC_IDC_SET_LEADCHNL_RA_PACING_CAPTURE_MODE: NORMAL
MDC_IDC_SET_LEADCHNL_RA_PACING_POLARITY: NORMAL
MDC_IDC_SET_LEADCHNL_RA_PACING_PULSEWIDTH: 0.4 MS
MDC_IDC_SET_LEADCHNL_RA_SENSING_POLARITY: NORMAL
MDC_IDC_SET_LEADCHNL_RA_SENSING_SENSITIVITY: 0.35 MV
MDC_IDC_SET_LEADCHNL_RV_PACING_AMPLITUDE: 2 V
MDC_IDC_SET_LEADCHNL_RV_PACING_CAPTURE_MODE: NORMAL
MDC_IDC_SET_LEADCHNL_RV_PACING_POLARITY: NORMAL
MDC_IDC_SET_LEADCHNL_RV_PACING_PULSEWIDTH: 0.4 MS
MDC_IDC_SET_LEADCHNL_RV_SENSING_POLARITY: NORMAL
MDC_IDC_SET_LEADCHNL_RV_SENSING_SENSITIVITY: 5.6 MV
MDC_IDC_SET_ZONE_DETECTION_INTERVAL: 333.33 MS
MDC_IDC_SET_ZONE_DETECTION_INTERVAL: 342.86 MS
MDC_IDC_SET_ZONE_TYPE: NORMAL
MDC_IDC_SET_ZONE_TYPE: NORMAL
MDC_IDC_STAT_AT_BURDEN_PERCENT: 0.2 %
MDC_IDC_STAT_AT_DTM_END: NORMAL
MDC_IDC_STAT_AT_DTM_START: NORMAL
MDC_IDC_STAT_AT_MODE_SW_COUNT: 86
MDC_IDC_STAT_BRADY_AP_VP_PERCENT: 0 %
MDC_IDC_STAT_BRADY_AP_VS_PERCENT: 99 %
MDC_IDC_STAT_BRADY_AS_VP_PERCENT: 0 %
MDC_IDC_STAT_BRADY_AS_VS_PERCENT: 1 %
MDC_IDC_STAT_BRADY_DTM_END: NORMAL
MDC_IDC_STAT_BRADY_DTM_START: NORMAL
MDC_IDC_STAT_BRADY_RA_PERCENT_PACED: 99 %
MDC_IDC_STAT_BRADY_RV_PERCENT_PACED: 1 %
MDC_IDC_STAT_EPISODE_RECENT_COUNT: 0
MDC_IDC_STAT_EPISODE_RECENT_COUNT: 40
MDC_IDC_STAT_EPISODE_RECENT_COUNT_DTM_END: NORMAL
MDC_IDC_STAT_EPISODE_RECENT_COUNT_DTM_END: NORMAL
MDC_IDC_STAT_EPISODE_RECENT_COUNT_DTM_START: NORMAL
MDC_IDC_STAT_EPISODE_RECENT_COUNT_DTM_START: NORMAL
MDC_IDC_STAT_EPISODE_TYPE: NORMAL
MDC_IDC_STAT_EPISODE_TYPE: NORMAL

## 2020-04-20 PROCEDURE — 93296 REM INTERROG EVL PM/IDS: CPT | Performed by: INTERNAL MEDICINE

## 2020-04-20 PROCEDURE — 93294 REM INTERROG EVL PM/LDLS PM: CPT | Performed by: INTERNAL MEDICINE

## 2020-06-04 ENCOUNTER — APPOINTMENT (OUTPATIENT)
Dept: GENERAL RADIOLOGY | Facility: CLINIC | Age: 85
End: 2020-06-04
Attending: EMERGENCY MEDICINE
Payer: MEDICARE

## 2020-06-04 ENCOUNTER — HOSPITAL ENCOUNTER (EMERGENCY)
Facility: CLINIC | Age: 85
Discharge: HOME | End: 2020-06-04
Attending: EMERGENCY MEDICINE | Admitting: EMERGENCY MEDICINE
Payer: MEDICARE

## 2020-06-04 ENCOUNTER — APPOINTMENT (OUTPATIENT)
Dept: ULTRASOUND IMAGING | Facility: CLINIC | Age: 85
End: 2020-06-04
Attending: EMERGENCY MEDICINE
Payer: MEDICARE

## 2020-06-04 VITALS
HEART RATE: 79 BPM | RESPIRATION RATE: 18 BRPM | TEMPERATURE: 98.1 F | OXYGEN SATURATION: 97 % | SYSTOLIC BLOOD PRESSURE: 155 MMHG | DIASTOLIC BLOOD PRESSURE: 77 MMHG

## 2020-06-04 DIAGNOSIS — M79.662 PAIN OF LEFT LOWER LEG: ICD-10-CM

## 2020-06-04 DIAGNOSIS — R58 ECCHYMOSIS: ICD-10-CM

## 2020-06-04 DIAGNOSIS — Z79.01 BLOOD THINNED DUE TO LONG-TERM ANTICOAGULANT USE: ICD-10-CM

## 2020-06-04 PROCEDURE — 93971 EXTREMITY STUDY: CPT | Mod: LT

## 2020-06-04 PROCEDURE — 73630 X-RAY EXAM OF FOOT: CPT | Mod: LT

## 2020-06-04 PROCEDURE — 73590 X-RAY EXAM OF LOWER LEG: CPT | Mod: LT

## 2020-06-04 PROCEDURE — 99285 EMERGENCY DEPT VISIT HI MDM: CPT | Mod: 25

## 2020-06-04 PROCEDURE — 73610 X-RAY EXAM OF ANKLE: CPT | Mod: LT

## 2020-06-04 NOTE — ED PROVIDER NOTES
History     Chief Complaint:  Fall      The history is provided by the patient.      Jose Rucker is a 89 year old male, with a history of paroxysmal atrial fibrillation and CHF and is on Eliquis, who presents with  presented with fall. The patient notes that around a week ago he was outside standing on a 5 gallon bucket and the bucket had tipped and scraped his left lower leg and denies being seen. He denies hitting his head. He reports that his nurse had noticed that his leg was not healing and that he should come in for evaluation. He notes pain as well in his lower extremity. He states that he has been able to walk on it. He denies, chest pain, shortness of breath, fever, cough, abdominal pain, nausea, emesis, or troubles breathing.     Allergies:  Penicillin G     Medications:    Eliquis  Lasix  Lisinopril   Metoprolol  Simvastatin    Past Medical History:    Atrial fibrillation   Bradycardia  Palpitations  Cardiac pacemaker in situ  Heart murmur  Hyperlipidemia    Hernia  Osteopenia  Primary cardiomyopathy  Prostate cancer  Syncope  Sick sinus syndrome  Palpitations  Osteoarthritis  Hyperlipidemia  Erectile dysfunction    Past Surgical History:    Hernia repair  Implant pacemaker  Prostate surgery    Family History:    Heart disease, mother  HTN, sister    Social History:  Smoking status: Former smoker  Alcohol use: Yes  Drug use: No  PCP: Cathy Jones  Presents to the ED unaccompanied  Marital Status:   [2]      Review of Systems   All other systems reviewed and are negative.    Physical Exam     Patient Vitals for the past 24 hrs:   BP Temp Temp src Pulse Heart Rate Resp SpO2   06/04/20 1331 (!) 155/77 -- -- -- 61 18 97 %   06/04/20 1147 (!) 175/78 98.1  F (36.7  C) Oral 79 -- 18 97 %        Physical Exam   General: Resting on the bed.  Well appearing no toxic.   Head: No obvious trauma to head.  Ears, Nose, Throat:  External ears normal.  Nose normal.    Eyes:  Conjunctivae clear.  Pupils are  equal, round, and reactive.   CV: Regular rate and rhythm.  No murmurs.      Respiratory: Effort normal and breath sounds normal.  No wheezing or crackles.   Gastrointestinal: Soft.  No distension. There is no tenderness.   Musculoskeletal: Tenderness with palpation of the left left tib-fib and ankle.  Full range of motion of the ankle and knee.  2+ DP pulses.  Sensation intact to light touch.  Pitting edema noted.  Neuro: Alert. Moving all extremities appropriately.  Normal speech.    Skin: Skin is warm and dry.  Left lower extremity with ecchymosis pressure on the lateral, posterior aspects.  Ecchymosis over the dorsum of the foot.  2+ DP pulses.  Mild abrasion to the left lateral lower leg.     Emergency Department Course     Imaging:  Radiology findings were communicated with the patient who voiced understanding of the findings.    XR Ankle left:  No evidence of fracture. The ankle mortise appears   congruent.     Reading per radiology      XR Tibia and Fibula left:  Negative exam.      Reading per radiology      XR left Foot:  No apparent fracture or dislocation.      Reading per radiology      US lower extremity:  No evidence of deep venous thrombosis.      Reading per radiology      Procedures    Emergency Department Course:   Nursing notes and vitals reviewed.    1155 I performed an exam of the patient as documented above.     1224 The patient was sent for a Left Foot XR, Ankle XR, Tibia and fibula, US lower extremity while in the emergency department, results above.      1330 I personally reviewed the results with the patient and answered all related questions prior to discharge.    Impression & Plan      Medical Decision Makin-year-old male on presents with leg pain.  Vital signs mildly hypertensive improved on recheck.  Broad differential was pursued including not limited to hematoma, contusion, sprain strain, fracture dislocation, neurovascular injury, compartment syndrome, cellulitis or abscess,  DVT, etc.  Overall patient's well-appearing nontoxic.  There is no areas of micheal cellulitis, no significant erythema, induration or warmth.  Afebrile.  Overall very low suspicion for cellulitis or abscess at this time.  Ultrasound shows no evidence of acute DVT.  Patient is on anticoagulation so this appears to be therapeutic.  X-ray of the foot, tib-fib and ankle all show no evidence acute fracture or dislocation.  Patient is up and ambulating on his leg.  I do not suspect an occult fracture.  Soft compartments, do not suspect compartment syndrome.  2+ DP pulses.  Sensation intact to light touch.  Motor intact.  I think that patient is otherwise neurovascular intact no convincing evidence of neurovascular injury.  No amenable lacerations to repair, these lacerations were greater than 24 hours from onset.  Do not feel that repair would be indicated nor do these require repair given how superficial they are.  Patient is here largely because the ecchymosis has persisted.  I discussed that with his blood thinner use I do suspect that he will have a prolonged reabsorption of this bruise.  It appears that it is just kind of involved in the last couple days.  I advised that his primary doctor recheck on Monday.  Strict return precautions were discussed.  Patient was discharged home.    Diagnosis:    ICD-10-CM    1. Pain of left lower leg  M79.662    2. Ecchymosis  R58    3. Blood thinned due to long-term anticoagulant use  Z79.01        Disposition:   The patient is discharged to home.     Scribe Disclosure:  I, Kristen Loera, am serving as a scribe at 1155 on 6/4/2020 to document services personally performed by Adriana Huerta MD based on my observations and the provider's statements to me.  [facility] EMERGENCY DEPARTMENT       Adriana Huerta MD  06/04/20 [id number]

## 2020-06-04 NOTE — ED NOTES
Emergency Department Attending Supervision Note  6/4/2020  12:02 PM      I evaluated this patient in conjunction with Dolores Morris DO.     Briefly, the patient with a history of paroxysmal atrial fibrillation and CHF and is on Eliquis, presented with fall. The patient notes that around a week ago he was outside standing on a 5 gallon bucket and the bucket had tipped and scraped his eft leg. He denies hitting his head. He reports that his nurse had noticed that his leg was not healing and that he should come in for evaluation. He notes pain as well in his lower extremity. He states that he has been able to walk on it. He denies, chest pain, shortness of breath, fever, cough, abdominal pain, nausea, emesis, or troubles breathing.     On my exam,   General: Resting on the bed.  Head: No obvious trauma to head.  Ears, Nose, Throat:  External ears normal.  Nose normal.  No pharyngeal erythema, swelling or exudate.  Midline uvula.    Eyes:  Conjunctivae clear.  Pupils are equal, round, and reactive.   Neck: Normal range of motion.  Neck supple.   CV: Regular rate and rhythm.  No murmurs.      Respiratory: Effort normal and breath sounds normal.  No wheezing or crackles.   Gastrointestinal: Soft.  No distension. There is no tenderness.  There is no rigidity, no rebound and no guarding.   Musculoskeletal: Normal range of motion.  Non tender extremities to palpations.    Neuro: Alert. Moving all extremities appropriately.  Normal speech.    Skin: Skin is warm and dry.  No rash noted.   Psych: Normal mood and affect. Behavior is normal.       Emergency Department Course       Imaging:  Radiology findings were communicated with the patient who voiced understanding of the findings.    No orders to display     Laboratory:  Laboratory findings were communicated with the patient who voiced understanding of the findings.    CBC: WBC ***, HGB ***, PLT ***  ***MP: *** o/w WNL (Creatinine  "***)  ***    Procedures    Interventions:  ***  Medications - No data to display    Emergency Department Course:   Nursing notes and vitals reviewed.    1205 I performed an exam of the patient as documented above.     *** IV was inserted and blood was drawn for laboratory testing, results above.     *** The patient was sent for a Lower extremity US and XR Left tibia and fibula while in the emergency department, results above.      *** I personally reviewed the results with the patient and answered all related questions prior to ***.    Impression & Plan      Medical Decision Making:  Jose Rucker is a 89 year old male who presents to the emergency department today for evaluation of ***    Diagnosis:  No diagnosis found.    Disposition:   ***    Critical Care time was *** minutes for this patient excluding procedures.     CMS Diagnoses: {Sepsis/Septic Shock/Stemi/Stroke:334434::\" \"}      {trauma activation?:528627::\" \"}    Discharge Medications:  New Prescriptions    No medications on file       Scribe Disclosure:  I, Kristen Loera, am serving as a scribe at 1205 on 6/4/2020 to document services personally performed by Adriana Huerta MD based on my observations and the provider's statements to me.    "

## 2020-06-04 NOTE — ED TRIAGE NOTES
Patient comes in for evaluation of a fall, trying to stand up onto 5 gallon bucket, the bucket tipped and patient fell, bucket scraped left leg. Denies hitting head, denies LOC. ABCs intact.

## 2020-06-04 NOTE — ED NOTES
This RN Reviewed patient discharge instructions with patient.  All questions answered, patient verbalized an understanding of follow up with PCP in 1 week.  Patient ambulated independently with this RN to ED doors to await his ride.

## 2020-06-04 NOTE — DISCHARGE INSTRUCTIONS
Follow-up with your primary doctor for wound recheck next week.  If you develop redness, swelling, drainage from the wound or fevers return to the ER emergently.  The bruise should start to heal over time.  You likely have such a large bruise due to your blood thinner use.    .

## 2020-06-04 NOTE — ED AVS SNAPSHOT
[facility] Emergency Department  [address]  [facility] [address]  Phone:  [phone]  Fax:  [phone]                                    Jose Rucker   MRN: [id number]    Department:  [facility] Emergency Department   Date of Visit:  6/4/2020           After Visit Summary Signature Page    I have received my discharge instructions, and my questions have been answered. I have discussed any challenges I see with this plan with the nurse or doctor.    ..........................................................................................................................................  Patient/Patient Representative Signature      ..........................................................................................................................................  Patient Representative Print Name and Relationship to Patient    ..................................................               ................................................  Date                                   Time    ..........................................................................................................................................  Reviewed by Signature/Title    ...................................................              ..............................................  Date                                               Time          22EPIC Rev 08/18

## 2020-06-25 DIAGNOSIS — Z95.0 CARDIAC PACEMAKER IN SITU: Primary | ICD-10-CM

## 2020-06-25 DIAGNOSIS — I49.5 SSS (SICK SINUS SYNDROME) (H): ICD-10-CM

## 2020-07-14 ENCOUNTER — HOSPITAL ENCOUNTER (EMERGENCY)
Facility: CLINIC | Age: 85
Discharge: HOME | End: 2020-07-14
Attending: EMERGENCY MEDICINE | Admitting: EMERGENCY MEDICINE
Payer: MEDICARE

## 2020-07-14 VITALS
RESPIRATION RATE: 18 BRPM | TEMPERATURE: 97.6 F | HEART RATE: 74 BPM | OXYGEN SATURATION: 99 % | SYSTOLIC BLOOD PRESSURE: 155 MMHG | DIASTOLIC BLOOD PRESSURE: 74 MMHG

## 2020-07-14 DIAGNOSIS — L97.922 ULCER OF LEFT LOWER EXTREMITY WITH FAT LAYER EXPOSED (H): ICD-10-CM

## 2020-07-14 LAB
ANION GAP SERPL CALCULATED.3IONS-SCNC: 6 MMOL/L (ref 3–14)
BASOPHILS # BLD AUTO: 0 10E9/L (ref 0–0.2)
BASOPHILS NFR BLD AUTO: 0.6 %
BUN SERPL-MCNC: 20 MG/DL (ref 7–30)
CALCIUM SERPL-MCNC: 8.7 MG/DL (ref 8.5–10.1)
CHLORIDE SERPL-SCNC: 105 MMOL/L (ref 94–109)
CO2 SERPL-SCNC: 24 MMOL/L (ref 20–32)
CREAT SERPL-MCNC: 1.08 MG/DL (ref 0.66–1.25)
CRP SERPL-MCNC: 13.8 MG/L (ref 0–8)
DIFFERENTIAL METHOD BLD: ABNORMAL
EOSINOPHIL # BLD AUTO: 0.2 10E9/L (ref 0–0.7)
EOSINOPHIL NFR BLD AUTO: 3.4 %
ERYTHROCYTE [DISTWIDTH] IN BLOOD BY AUTOMATED COUNT: 12.5 % (ref 10–15)
ERYTHROCYTE [SEDIMENTATION RATE] IN BLOOD BY WESTERGREN METHOD: 43 MM/H (ref 0–20)
GFR SERPL CREATININE-BSD FRML MDRD: 60 ML/MIN/{1.73_M2}
GLUCOSE SERPL-MCNC: 101 MG/DL (ref 70–99)
GRAM STN SPEC: ABNORMAL
HCT VFR BLD AUTO: 38.4 % (ref 40–53)
HGB BLD-MCNC: 11.7 G/DL (ref 13.3–17.7)
IMM GRANULOCYTES # BLD: 0 10E9/L (ref 0–0.4)
IMM GRANULOCYTES NFR BLD: 0.3 %
LACTATE BLD-SCNC: 1.3 MMOL/L (ref 0.7–2)
LYMPHOCYTES # BLD AUTO: 1.8 10E9/L (ref 0.8–5.3)
LYMPHOCYTES NFR BLD AUTO: 27.6 %
Lab: ABNORMAL
MCH RBC QN AUTO: 30.5 PG (ref 26.5–33)
MCHC RBC AUTO-ENTMCNC: 30.5 G/DL (ref 31.5–36.5)
MCV RBC AUTO: 100 FL (ref 78–100)
MONOCYTES # BLD AUTO: 0.8 10E9/L (ref 0–1.3)
MONOCYTES NFR BLD AUTO: 11.9 %
NEUTROPHILS # BLD AUTO: 3.7 10E9/L (ref 1.6–8.3)
NEUTROPHILS NFR BLD AUTO: 56.2 %
NRBC # BLD AUTO: 0 10*3/UL
NRBC BLD AUTO-RTO: 0 /100
PLATELET # BLD AUTO: 234 10E9/L (ref 150–450)
POTASSIUM SERPL-SCNC: 4.5 MMOL/L (ref 3.4–5.3)
RBC # BLD AUTO: 3.84 10E12/L (ref 4.4–5.9)
SODIUM SERPL-SCNC: 135 MMOL/L (ref 133–144)
SPECIMEN SOURCE: ABNORMAL
WBC # BLD AUTO: 6.5 10E9/L (ref 4–11)

## 2020-07-14 PROCEDURE — 99284 EMERGENCY DEPT VISIT MOD MDM: CPT | Mod: 25

## 2020-07-14 PROCEDURE — 25000128 H RX IP 250 OP 636: Performed by: EMERGENCY MEDICINE

## 2020-07-14 PROCEDURE — 96374 THER/PROPH/DIAG INJ IV PUSH: CPT

## 2020-07-14 PROCEDURE — 85652 RBC SED RATE AUTOMATED: CPT | Performed by: EMERGENCY MEDICINE

## 2020-07-14 PROCEDURE — 83605 ASSAY OF LACTIC ACID: CPT | Performed by: EMERGENCY MEDICINE

## 2020-07-14 PROCEDURE — 87040 BLOOD CULTURE FOR BACTERIA: CPT | Performed by: EMERGENCY MEDICINE

## 2020-07-14 PROCEDURE — 87205 SMEAR GRAM STAIN: CPT | Performed by: EMERGENCY MEDICINE

## 2020-07-14 PROCEDURE — 86140 C-REACTIVE PROTEIN: CPT | Performed by: EMERGENCY MEDICINE

## 2020-07-14 PROCEDURE — 85025 COMPLETE CBC W/AUTO DIFF WBC: CPT | Performed by: EMERGENCY MEDICINE

## 2020-07-14 PROCEDURE — 87070 CULTURE OTHR SPECIMN AEROBIC: CPT | Mod: XS | Performed by: EMERGENCY MEDICINE

## 2020-07-14 PROCEDURE — 80048 BASIC METABOLIC PNL TOTAL CA: CPT | Performed by: EMERGENCY MEDICINE

## 2020-07-14 RX ORDER — CEFAZOLIN SODIUM 2 G/100ML
2 INJECTION, SOLUTION INTRAVENOUS ONCE
Status: COMPLETED | OUTPATIENT
Start: 2020-07-14 | End: 2020-07-14

## 2020-07-14 RX ORDER — CLINDAMYCIN HCL 300 MG
300 CAPSULE ORAL 4 TIMES DAILY
Qty: 40 CAPSULE | Refills: 0 | Status: SHIPPED | OUTPATIENT
Start: 2020-07-14 | End: 2020-07-24

## 2020-07-14 RX ADMIN — CEFAZOLIN SODIUM 2 G: 2 INJECTION, SOLUTION INTRAVENOUS at 12:51

## 2020-07-14 ASSESSMENT — ENCOUNTER SYMPTOMS
COLOR CHANGE: 1
WOUND: 1

## 2020-07-14 NOTE — RESULT ENCOUNTER NOTE
[facility] ED discharge antibiotic (if prescribed):  Clindamycin (Cleocin) 300 mg PO capsule, 1 capsule (300 mg) by mouth 4 times daily for 10 days  Incision and Drainage performed in [facility] ED [Yes / No] : No  No changes in treatment per ED lab result protocol.

## 2020-07-14 NOTE — ED PROVIDER NOTES
"  History   Chief Complaint  Wound Check      HPI   Jose Rucker is a 89 year old male with a history of atrial fibrillation and congestive heart failure, among others, anticoagulated on Eliquis, who presents to the emergency department with his wife for wound check. The patient reports he sustained a laceration to the left leg in early June, about a month ago, when a 5 gallon bucket tipped over and hit his leg. He was seen here in the emergency department for this issue on 06/04/2020 due to persistent ecchymosis. At that time wound appeared superficial and repair was not indicated, thus patient was encouraged regular follow ups with primary care to ensure proper healing. Today she patient returns due to discoloration of the area surrounding the wound and lack of healing. He has not been seen for this concern since his visit in June. His wife reports initially the region surrounding the wound was \"black and blue\" due to persistent bruising. As the bruising finally resolved, over the last few weeks or so they noticed worsening redness, prompting their return today. He denies significant pain.     Allergies  Penicillin G    Medications  Eliquis   Lasix   Lisinopril   Metoprolol succinate   Simvastatin     Past Medical History  Atrial fibrillation   Bradycardia   Cardiac pacemaker in place   Cardiomyopathy   Congestive heart failure   Heart murmurs   Hernia abdominal   Hypertension   Hyperlipidemia    Osteoarthritis   Prostate cancer   Sick sinus syndrome     Past Surgical History  Cystoscopy   Hernia repair   Implant pacemaker   Prostate surgery     Family History  Heart disease    Social History:  The patient was accompanied to the ED by his wife.  Smoking Status: Former Smoker  Smokeless Tobacco: Never Used  Alcohol Use: Yes  Drug Use: No  PCP: Cathy Jones    Review of Systems   Skin: Positive for color change and wound.   All other systems reviewed and are negative.      Physical Exam     Patient Vitals for " the past 24 hrs:   BP Temp Temp src Pulse Resp SpO2   07/14/20 1139 (!) 155/74 97.6  F (36.4  C) Oral 74 18 99 %       Physical Exam    GEN: alert, lying comfortably     HEAD: atraumatic    EYES: pupils reactive, extraocular muscles intact, conjunctivae normal    ENT: TMs normal as are EACs; nares patent; normal posterior pharynx and oral mucosa    NECK: no posterior midline tenderness, no meningeal signs, trachea midline     RESPIRATORY: no tachypnea, breath sounds clear to auscultation    CVS: normal S1/S2, no murmurs/rubs/gallops    ABDOMEN: soft, nontender, no masses or organomegaly, no rebound, positive bowel sounds    MUSCULOSKELETAL: no deformities    SKIN: On the lateral aspect of the left lower leg, at the junction of the middle and distal third, there is a half dollar sized ulceration that is quite deep, 1 cm in depth, with black necrotic tissue present in the crater. More concerning, around it there is cellulitis with an area of erythema covering the entire anterior and lateral aspect of the lower leg. Foot appears normal in terms of skin color. Dorsalis pedis pulse intact and foot appears to be well perfused without cellulitis.  Mild malodorous smell from the wound.    NEURO: GCS 15, cranial nerves intact.  Motor and sensory- good tone; normal gait and coordination    LYMPH: no lymphadenopathy    PSYCHE:  Normal     Emergency Department Course     Laboratory:  Laboratory findings were communicated with the patient and wife who voiced understanding of the findings.    Lactic acid whole blood(1320): 1.3     CBC: WBC: 6.5, HGB: 11.7 (L), PLT: 234    BMP: Glucose 101 (H), GFR 60 (L), o/w WNL (Creatinine: 1.08)    CRP inflammation: 13.8 (H)    Sed rate: 43 (H)    Blood culture: Pending     Wound culture aerobic bacterial: Pending     Gram stain: Pending     Interventions:  1251 Cefazolin 2 g in 100 mL dextrose IV    Emergency Department Course:  Past medical records, nursing notes, and vitals  reviewed.    1144 I performed an exam of the patient as documented above.     1228 IV was inserted and blood was drawn for laboratory testing, results above.    1335 I consulted with Dr. Kaur, with general surgery, regarding the patient's history and presentation here in the emergency department.     1414 I rechecked the patient and discussed the results of the workup thus far.     Findings and plan explained to the patient and wife. Patient discharged home with instructions regarding supportive care, medications, and reasons to return. The importance of close follow-up was reviewed. The patient was prescribed Cleocin.    I personally reviewed the laboratory results with the patient and wife and answered all related questions prior to discharge.     Impression & Plan     Medical Decision Making:  Jose Rucker is a 89 year old male who presents with ulcer to the left lower leg. He comes in today because he noted redness around it consistent with cellulitis. He has not had this ulcer taken care of since it developed over a month ago when he injured his leg. He denies history of peripheral vascular disease and quit smoking years ago. He is also not a diabetic by his report.     He feels systemically well and does not want to be admitted to the hospital.  His inflammatory markers are unremarkable including CBC with differential, sed rate slightly elevated, and CRP slightly elevated.     I spoke with the [facility] wound care clinic and the floyd Cooney who fortunately had an appointment open up at noon tomorrow. I gave the patient and his spouse the address, suite number, phone number, and appointment time. They are to keep that appointment and start clindamycin as directed as he is allergic to penicillins and follow up at that time. The area of cellulitis was highlighted with skin marker and I directed him to return immediately with development of fever, chills, or other new symptoms in the interim or  anytime thereafter.     Diagnosis:    ICD-10-CM    1. 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)  L97.922        Disposition:  Discharged to home.    Discharge Medications:  New Prescriptions    CLINDAMYCIN (CLEOCIN) 300 MG CAPSULE    Take 1 capsule (300 mg) by mouth 4 times daily for 10 days       Scribe Disclosure:  Victoria SHER, am serving as a scribe at 11:45 AM on 7/14/2020 to document services personally performed by Obi Martinez MD based on my observations and the provider's statements to me.      Obi Martinez MD  07/14/20 9459

## 2020-07-14 NOTE — ED AVS SNAPSHOT
[facility] Emergency Department  [address]  [facility] [address]  Phone:  [phone]  Fax:  [phone]                                    Jose Rucker   MRN: [id number]    Department:  [facility] Emergency Department   Date of Visit:  7/14/2020           After Visit Summary Signature Page    I have received my discharge instructions, and my questions have been answered. I have discussed any challenges I see with this plan with the nurse or doctor.    ..........................................................................................................................................  Patient/Patient Representative Signature      ..........................................................................................................................................  Patient Representative Print Name and Relationship to Patient    ..................................................               ................................................  Date                                   Time    ..........................................................................................................................................  Reviewed by Signature/Title    ...................................................              ..............................................  Date                                               Time          22EPIC Rev 08/18

## 2020-07-15 ENCOUNTER — HOSPITAL ENCOUNTER (OUTPATIENT)
Dept: WOUND CARE | Facility: CLINIC | Age: 85
Discharge: HOME | End: 2020-07-15
Attending: SURGERY | Admitting: SURGERY
Payer: MEDICARE

## 2020-07-15 VITALS
DIASTOLIC BLOOD PRESSURE: 80 MMHG | RESPIRATION RATE: 18 BRPM | HEART RATE: 82 BPM | SYSTOLIC BLOOD PRESSURE: 157 MMHG | TEMPERATURE: 97.1 F

## 2020-07-15 DIAGNOSIS — L97.922 ULCER OF LEFT LOWER EXTREMITY WITH FAT LAYER EXPOSED (H): ICD-10-CM

## 2020-07-15 PROCEDURE — G0463 HOSPITAL OUTPT CLINIC VISIT: HCPCS | Mod: 25

## 2020-07-15 PROCEDURE — 97602 WOUND(S) CARE NON-SELECTIVE: CPT

## 2020-07-15 PROCEDURE — 99203 OFFICE O/P NEW LOW 30 MIN: CPT | Performed by: SURGERY

## 2020-07-15 RX ORDER — CEFAZOLIN SODIUM 1 G/3ML
1 INJECTION, POWDER, FOR SOLUTION INTRAMUSCULAR; INTRAVENOUS SEE ADMIN INSTRUCTIONS
Status: CANCELLED | OUTPATIENT
Start: 2020-07-15

## 2020-07-15 RX ORDER — CEFAZOLIN SODIUM 2 G/100ML
2 INJECTION, SOLUTION INTRAVENOUS
Status: CANCELLED | OUTPATIENT
Start: 2020-07-15

## 2020-07-15 NOTE — PROGRESS NOTES
[facility] Progress Note    Subject: Jose LIDIA Rucker consult for chronic wound left lateral calf.  The patient is an 89-year-old male present with his wife, he was standing on a bucket approximately 4 to 6 weeks ago, fell, sustained trauma to the left lateral calf, developed subcutaneous hematoma, on chronic anticoagulation due to atrial fibrillation.  Is been in the emergency room twice for evaluation, is on antibiotics, follow odor from wound.  He denies fevers chills or sweats.  No history of COVID.  Nondiabetic, does not utilize tobacco.  History of prostate cancer.  Complete review systems otherwise negative.  Culture was obtained yesterday.    PMH:   Past Medical History:   Diagnosis Date     A-fib (H)      Bradycardia     PPM 5/27/2008     Cardiac pacemaker in situ 9/17/2014 2008 PPM Medtronic Sensia, model# SEDR01, serial# [id number]       Heart murmur 5/23/2007    Overview:  Benign Echo     Hernia, abdominal      HTN (hypertension)      Hyperlipidemia with target LDL less than 130 5/23/2007    Overview:  ICD 10     Osteoarthritis of pelvic region 11/12/2008    Overview:  LW Modifier:  Rt, SHARON done 1/15/09 LW Onset:  5-6 years ; DJD Hip     Palpitations      Primary cardiomyopathy (H) 11/4/2014     Problem list name updated by automated process. Provider to review     Prostate cancer (H) 6/26/2007    Overview:  [place] Score:5+5=1 Radiation Therapy x 25 + Radioactive Seed Implant     Sick sinus syndrome (H)      Syncope      Patient Active Problem List   Diagnosis     A-fib (H)     HTN (hypertension)     Bradycardia     Syncope     Cardiac pacemaker in situ     Sick sinus syndrome (H)     Primary cardiomyopathy (H)     Carpal tunnel syndrome     Disorder of penis     Hearing loss     Heart murmur     Hip joint replacement status     Hyperlipidemia with target LDL less than 130     Osteoarthritis of pelvic region     Palpitations     Prostate cancer (H)     Senile cataract      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)     Social Hx:   Social History     Socioeconomic History     Marital status:      Spouse name: Not on file     Number of children: Not on file     Years of education: Not on file     Highest education level: Not on file   Occupational History     Not on file   Social Needs     Financial resource strain: Not on file     Food insecurity     Worry: Not on file     Inability: Not on file     Transportation needs     Medical: Not on file     Non-medical: Not on file   Tobacco Use     Smoking status: Former Smoker     Packs/day: 0.50     Years: 6.00     Pack years: 3.00     Types: Cigarettes     Last attempt to quit:      Years since quittin.5     Smokeless tobacco: Never Used   Substance and Sexual Activity     Alcohol use: Yes     Comment: 1 beer daily     Drug use: Not on file     Sexual activity: Not on file   Lifestyle     Physical activity     Days per week: Not on file     Minutes per session: Not on file     Stress: Not on file   Relationships     Social connections     Talks on phone: Not on file     Gets together: Not on file     Attends Oriental orthodox service: Not on file     Active member of club or organization: Not on file     Attends meetings of clubs or organizations: Not on file     Relationship status: Not on file     Intimate partner violence     Fear of current or ex partner: Not on file     Emotionally abused: Not on file     Physically abused: Not on file     Forced sexual activity: Not on file   Other Topics Concern      Service Not Asked     Blood Transfusions Not Asked     Caffeine Concern No     Comment: 2 cups daily     Occupational Exposure Not Asked     Hobby Hazards Not Asked     Sleep Concern Not Asked     Stress Concern Not Asked     Weight Concern Not Asked     Special Diet No     Back Care Not Asked     Exercise No     Comment: some stairs, yardwork, uses exercise ball     Bike Helmet Not Asked     Seat Belt Not  Asked     Self-Exams Not Asked     Parent/sibling w/ CABG, MI or angioplasty before 65F 55M? Not Asked   Social History Narrative     Not on file       Surgical Hx:   Past Surgical History:   Procedure Laterality Date     CYSTOSCOPY       EP PPM GENERATOR CHANGE DUAL  2/10/16     HERNIA REPAIR       IMPLANT PACEMAKER  5/7/08    Medtronic Sensia, model# SEDR01, serial# [id number]     PROSTATE SURGERY         Allergies:    Allergies   Allergen Reactions     Penicillin G Rash     And patient had an awful smell        Medications:   Current Outpatient Medications   Medication     apixaban ANTICOAGULANT (ELIQUIS) 5 MG tablet     clindamycin (CLEOCIN) 300 MG capsule     Diphenhydramine-APAP, sleep, (TYLENOL PM EXTRA STRENGTH PO)     furosemide (LASIX) 40 MG tablet     lisinopril (PRINIVIL/ZESTRIL) 10 MG tablet     metoprolol succinate ER (TOPROL-XL) 50 MG 24 hr tablet     Multiple Minerals-Vitamins (PROSTEON PO)     simvastatin (ZOCOR) 20 MG tablet     No current facility-administered medications for this encounter.        Labs:   Recent Labs   Lab Test 07/14/20  1226 02/10/16  1104   HGB 11.7* 14.0   INR  --  1.07   WBC 6.5 5.0   CRP 13.8*  --      Creatinine   Date Value Ref Range Status   07/14/2020 1.08 0.66 - 1.25 mg/dL Final     GFR Estimate   Date Value Ref Range Status   07/14/2020 60 (L) >60 mL/min/[1.73_m2] Final     Comment:     Non  GFR Calc  Starting 12/18/2018, serum creatinine based estimated GFR (eGFR) will be   calculated using the Chronic Kidney Disease Epidemiology Collaboration   (CKD-EPI) equation.       GFR Estimate If Black   Date Value Ref Range Status   07/14/2020 70 >60 mL/min/[1.73_m2] Final     Comment:      GFR Calc  Starting 12/18/2018, serum creatinine based estimated GFR (eGFR) will be   calculated using the Chronic Kidney Disease Epidemiology Collaboration   (CKD-EPI) equation.       Lab Results   Component Value Date    WBC 6.5 07/14/2020     Lab Results    Component Value Date    RBC 3.84 07/14/2020     Lab Results   Component Value Date    HGB 11.7 07/14/2020     Lab Results   Component Value Date    HCT 38.4 07/14/2020     No components found for: MCT  Lab Results   Component Value Date     07/14/2020     Lab Results   Component Value Date    MCH 30.5 07/14/2020     Lab Results   Component Value Date    MCHC 30.5 07/14/2020     Lab Results   Component Value Date    RDW 12.5 07/14/2020     Lab Results   Component Value Date     07/14/2020          Nutrition requirements were discussed with patient today.  Objective:  BP (!) 157/80   Pulse 82   Temp 97.1  F (36.2  C) (Temporal)   Resp 18   Wound (used by OP [facility] only) 07/15/20 1216 Left lateral;lower leg trauma (Active)   Length (cm) 3.3 07/15/20 1200   Width (cm) 2.7 07/15/20 1200   Depth (cm) 1.8 07/15/20 1200   Wound (cm^2) 8.91 cm^2 07/15/20 1200   Wound Volume (cm^3) 16.04 cm^3 07/15/20 1200   Undermining [Depth (cm)/Location] 12-12 oclock, depth 6.7 07/15/20 1200   Dressing Appearance moist drainage 07/15/20 1200   Drainage Characteristics/Odor brown;malodorous 07/15/20 1200   Drainage Amount large 07/15/20 1200   Thickness/Stage full thickness 07/15/20 1200   Base moist;necrotic 07/15/20 1200   Periwound swelling 07/15/20 1200   Periwound Temperature warm 07/15/20 1200   Periwound Skin Turgor soft 07/15/20 1200   Edges open 07/15/20 1200   Care, Wound debrided 07/15/20 1200        General:  Patient is alert and orientated, no acute distress.  Conversant, 89-year-old gentleman.  Palpable left dorsalis pedis and popliteal pulse.  Undermining explored at the left lateral calf wound, significant, drawn on skin, foul odor present, necrotic tissue within extensive undermining.  Motor and sensory of the left foot is otherwise intact.  Cellulitis left lateral calf, posterior calf, anterior tibial margin.  Pulmonary clear to auscultation  Cardiac A. fib, positive S1-S2 without murmur  Abdomen mildly  obese, soft, nontender    Vascular: Pedal pulse and popliteal pulse of the left lower extremity intact        Impression: Chronic left lower extremity wound, chronic anticoagulation for atrial fibrillation, subcutaneous hematoma, significant undermining and necrotic tissue      Plan: As risk benefits of operative debridement of the left lower extremity ulceration with subsequent intraoperative placement of negative pressure wound therapy, [facility] specific, outpatient, home care to then begin assessment and VAC dressing changes 3 times a week, irrigate with hypochlorous acid at time of VAC changes, let sit for 15 minutes.  See above for preoperative history and physical examination.  And anticoagulants.  He will undergo COVID testing today.  Will be seen approximately 2 weeks postoperatively..  Patient will return to the clinic in 2 weeks time.     Phil Orellana MD on 7/15/2020 at 1:34 PM

## 2020-07-15 NOTE — PROGRESS NOTES
Patient arrived for wound care visit. Certified Wound Care Nurse time spent evaluating patient record, completed a full evaluation and documented wound(s) & anamaria-wound skin; provided recommendation based on treatment plan. Applied dressing, reviewed discharge instructions, patient education, and discussed plan of care with appropriate medical team staff members and patient and/or family members.

## 2020-07-15 NOTE — DISCHARGE INSTRUCTIONS
[facility]  [address] [address]    Call us at [phone] if you have any questions about your wounds, have redness or swelling around your wound, have a fever of 101 or greater or if you have any other problems or concerns. We answer the phone Monday through Friday 8 am to 4 pm, please leave a message as we check the voicemail frequently throughout the day.     Jose Rucker      2/4/1931    Wound Dressing Change:Left Leg  Cleanse wound and surrounding skin with: saline  Pack wound with vashe damp roll gauze  Change dressing twice a day until surgery       ARABELLA Orellana M.D.. July 15, 2020

## 2020-07-15 NOTE — PROGRESS NOTES
Patient Education    Procedure: I&D of left leg wound  Diagnosis: ulcer  Anticoagulation Instruction: none  Pre-Operative Physical Exam: You need to have a pre-op physical exam within 30 days of your procedure. Your procedure may be cancelled if you do not have a current History and Physical. Call your PCP's office to schedule.  Allergies:  Updated in Epic  Bowel Prep: none  NPO per protocol.   Post Procedure Education: [facility] patient post-procedure fact sheet reviewed with patient.    COVID-19 instructions for isolation and pre testing reviewed with pt.    Learner(s):patient and significant other  Method: Listening  Barriers to Learning:No Barrier  Outcome: Patient did verbalize understanding of above education.

## 2020-07-15 NOTE — OR NURSING
No Covid test completed for this patient related to late procedure scheduled.  Management aware of situation.  Pacemaker report given to [facility] for review.  Wife Shahla will be driving patient to and from this procedure.

## 2020-07-16 ENCOUNTER — ANESTHESIA EVENT (OUTPATIENT)
Dept: SURGERY | Facility: CLINIC | Age: 85
End: 2020-07-16
Payer: MEDICARE

## 2020-07-16 ENCOUNTER — TELEPHONE (OUTPATIENT)
Dept: WOUND CARE | Facility: CLINIC | Age: 85
End: 2020-07-16

## 2020-07-16 ENCOUNTER — HOSPITAL ENCOUNTER (OUTPATIENT)
Facility: CLINIC | Age: 85
Discharge: HOME | End: 2020-07-16
Attending: SURGERY | Admitting: SURGERY
Payer: MEDICARE

## 2020-07-16 ENCOUNTER — ANESTHESIA (OUTPATIENT)
Dept: SURGERY | Facility: CLINIC | Age: 85
End: 2020-07-16
Payer: MEDICARE

## 2020-07-16 ENCOUNTER — APPOINTMENT (OUTPATIENT)
Dept: SURGERY | Facility: PHYSICIAN GROUP | Age: 85
End: 2020-07-16
Payer: MEDICARE

## 2020-07-16 VITALS
OXYGEN SATURATION: 98 % | HEART RATE: 61 BPM | SYSTOLIC BLOOD PRESSURE: 152 MMHG | RESPIRATION RATE: 11 BRPM | DIASTOLIC BLOOD PRESSURE: 67 MMHG | BODY MASS INDEX: 25.7 KG/M2 | HEIGHT: 70 IN | WEIGHT: 179.5 LBS | TEMPERATURE: 96.8 F

## 2020-07-16 DIAGNOSIS — L97.922 ULCER OF LEFT LOWER EXTREMITY WITH FAT LAYER EXPOSED (H): ICD-10-CM

## 2020-07-16 LAB
BACTERIA SPEC CULT: NORMAL
Lab: NORMAL
POTASSIUM SERPL-SCNC: 4.5 MMOL/L (ref 3.4–5.3)
SPECIMEN SOURCE: NORMAL

## 2020-07-16 PROCEDURE — 71000012 ZZH RECOVERY PHASE 1 LEVEL 1 FIRST HR: Performed by: SURGERY

## 2020-07-16 PROCEDURE — 84132 ASSAY OF SERUM POTASSIUM: CPT | Performed by: ANESTHESIOLOGY

## 2020-07-16 PROCEDURE — 40000170 ZZH STATISTIC PRE-PROCEDURE ASSESSMENT II: Performed by: SURGERY

## 2020-07-16 PROCEDURE — 37000009 ZZH ANESTHESIA TECHNICAL FEE, EACH ADDTL 15 MIN: Performed by: SURGERY

## 2020-07-16 PROCEDURE — 25000128 H RX IP 250 OP 636: Performed by: NURSE ANESTHETIST, CERTIFIED REGISTERED

## 2020-07-16 PROCEDURE — 37000008 ZZH ANESTHESIA TECHNICAL FEE, 1ST 30 MIN: Performed by: SURGERY

## 2020-07-16 PROCEDURE — 11042 DBRDMT SUBQ TIS 1ST 20SQCM/<: CPT | Performed by: SURGERY

## 2020-07-16 PROCEDURE — 27211024 ZZHC OR SUPPLY OTHER OPNP: Performed by: SURGERY

## 2020-07-16 PROCEDURE — 36415 COLL VENOUS BLD VENIPUNCTURE: CPT | Performed by: ANESTHESIOLOGY

## 2020-07-16 PROCEDURE — 36000052 ZZH SURGERY LEVEL 2 EA 15 ADDTL MIN: Performed by: SURGERY

## 2020-07-16 PROCEDURE — 36000050 ZZH SURGERY LEVEL 2 1ST 30 MIN: Performed by: SURGERY

## 2020-07-16 PROCEDURE — 71000027 ZZH RECOVERY PHASE 2 EACH 15 MINS: Performed by: SURGERY

## 2020-07-16 PROCEDURE — 25000128 H RX IP 250 OP 636: Performed by: SURGERY

## 2020-07-16 PROCEDURE — 27210794 ZZH OR GENERAL SUPPLY STERILE: Performed by: SURGERY

## 2020-07-16 PROCEDURE — 25800030 ZZH RX IP 258 OP 636: Performed by: NURSE ANESTHETIST, CERTIFIED REGISTERED

## 2020-07-16 PROCEDURE — 11045 DBRDMT SUBQ TISS EACH ADDL: CPT | Performed by: SURGERY

## 2020-07-16 PROCEDURE — 25000125 ZZHC RX 250: Performed by: SURGERY

## 2020-07-16 RX ORDER — ONDANSETRON 2 MG/ML
4 INJECTION INTRAMUSCULAR; INTRAVENOUS EVERY 30 MIN PRN
Status: DISCONTINUED | OUTPATIENT
Start: 2020-07-16 | End: 2020-07-16 | Stop reason: HOSPADM

## 2020-07-16 RX ORDER — SODIUM CHLORIDE, SODIUM LACTATE, POTASSIUM CHLORIDE, CALCIUM CHLORIDE 600; 310; 30; 20 MG/100ML; MG/100ML; MG/100ML; MG/100ML
INJECTION, SOLUTION INTRAVENOUS CONTINUOUS
Status: DISCONTINUED | OUTPATIENT
Start: 2020-07-16 | End: 2020-07-16 | Stop reason: HOSPADM

## 2020-07-16 RX ORDER — FENTANYL CITRATE 50 UG/ML
INJECTION, SOLUTION INTRAMUSCULAR; INTRAVENOUS PRN
Status: DISCONTINUED | OUTPATIENT
Start: 2020-07-16 | End: 2020-07-16

## 2020-07-16 RX ORDER — NALOXONE HYDROCHLORIDE 0.4 MG/ML
.1-.4 INJECTION, SOLUTION INTRAMUSCULAR; INTRAVENOUS; SUBCUTANEOUS
Status: DISCONTINUED | OUTPATIENT
Start: 2020-07-16 | End: 2020-07-16 | Stop reason: HOSPADM

## 2020-07-16 RX ORDER — PROPOFOL 10 MG/ML
INJECTION, EMULSION INTRAVENOUS PRN
Status: DISCONTINUED | OUTPATIENT
Start: 2020-07-16 | End: 2020-07-16

## 2020-07-16 RX ORDER — PROPOFOL 10 MG/ML
INJECTION, EMULSION INTRAVENOUS CONTINUOUS PRN
Status: DISCONTINUED | OUTPATIENT
Start: 2020-07-16 | End: 2020-07-16

## 2020-07-16 RX ORDER — SODIUM CHLORIDE, SODIUM LACTATE, POTASSIUM CHLORIDE, CALCIUM CHLORIDE 600; 310; 30; 20 MG/100ML; MG/100ML; MG/100ML; MG/100ML
INJECTION, SOLUTION INTRAVENOUS CONTINUOUS PRN
Status: DISCONTINUED | OUTPATIENT
Start: 2020-07-16 | End: 2020-07-16

## 2020-07-16 RX ORDER — FENTANYL CITRATE 50 UG/ML
25-50 INJECTION, SOLUTION INTRAMUSCULAR; INTRAVENOUS
Status: DISCONTINUED | OUTPATIENT
Start: 2020-07-16 | End: 2020-07-16 | Stop reason: HOSPADM

## 2020-07-16 RX ORDER — CEFAZOLIN SODIUM 2 G/100ML
2 INJECTION, SOLUTION INTRAVENOUS
Status: COMPLETED | OUTPATIENT
Start: 2020-07-16 | End: 2020-07-16

## 2020-07-16 RX ORDER — ONDANSETRON 2 MG/ML
INJECTION INTRAMUSCULAR; INTRAVENOUS PRN
Status: DISCONTINUED | OUTPATIENT
Start: 2020-07-16 | End: 2020-07-16

## 2020-07-16 RX ORDER — CEFAZOLIN SODIUM 1 G/3ML
1 INJECTION, POWDER, FOR SOLUTION INTRAMUSCULAR; INTRAVENOUS SEE ADMIN INSTRUCTIONS
Status: DISCONTINUED | OUTPATIENT
Start: 2020-07-16 | End: 2020-07-16 | Stop reason: HOSPADM

## 2020-07-16 RX ORDER — MAGNESIUM HYDROXIDE 1200 MG/15ML
LIQUID ORAL PRN
Status: DISCONTINUED | OUTPATIENT
Start: 2020-07-16 | End: 2020-07-16 | Stop reason: HOSPADM

## 2020-07-16 RX ORDER — ONDANSETRON 4 MG/1
4 TABLET, ORALLY DISINTEGRATING ORAL EVERY 30 MIN PRN
Status: DISCONTINUED | OUTPATIENT
Start: 2020-07-16 | End: 2020-07-16 | Stop reason: HOSPADM

## 2020-07-16 RX ORDER — HYDROMORPHONE HYDROCHLORIDE 1 MG/ML
.3-.5 INJECTION, SOLUTION INTRAMUSCULAR; INTRAVENOUS; SUBCUTANEOUS EVERY 10 MIN PRN
Status: DISCONTINUED | OUTPATIENT
Start: 2020-07-16 | End: 2020-07-16 | Stop reason: HOSPADM

## 2020-07-16 RX ORDER — ACETAMINOPHEN 325 MG/1
650 TABLET ORAL
Status: CANCELLED | OUTPATIENT
Start: 2020-07-16

## 2020-07-16 RX ADMIN — FENTANYL CITRATE 25 MCG: 50 INJECTION, SOLUTION INTRAMUSCULAR; INTRAVENOUS at 15:55

## 2020-07-16 RX ADMIN — SODIUM CHLORIDE, POTASSIUM CHLORIDE, SODIUM LACTATE AND CALCIUM CHLORIDE: 600; 310; 30; 20 INJECTION, SOLUTION INTRAVENOUS at 15:35

## 2020-07-16 RX ADMIN — ONDANSETRON 4 MG: 2 INJECTION INTRAMUSCULAR; INTRAVENOUS at 15:39

## 2020-07-16 RX ADMIN — FENTANYL CITRATE 50 MCG: 50 INJECTION, SOLUTION INTRAMUSCULAR; INTRAVENOUS at 15:38

## 2020-07-16 RX ADMIN — FENTANYL CITRATE 25 MCG: 50 INJECTION, SOLUTION INTRAMUSCULAR; INTRAVENOUS at 16:08

## 2020-07-16 RX ADMIN — CEFAZOLIN SODIUM 2 G: 2 INJECTION, SOLUTION INTRAVENOUS at 15:39

## 2020-07-16 RX ADMIN — PROPOFOL 10 MG: 10 INJECTION, EMULSION INTRAVENOUS at 16:16

## 2020-07-16 RX ADMIN — PROPOFOL 50 MCG/KG/MIN: 10 INJECTION, EMULSION INTRAVENOUS at 15:39

## 2020-07-16 ASSESSMENT — MIFFLIN-ST. JEOR: SCORE: 1485.46

## 2020-07-16 ASSESSMENT — LIFESTYLE VARIABLES: TOBACCO_USE: 0

## 2020-07-16 ASSESSMENT — ENCOUNTER SYMPTOMS: SEIZURES: 0

## 2020-07-16 NOTE — ANESTHESIA PREPROCEDURE EVALUATION
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) [L97.922]    Procedure(s):  IRRIGATION AND DEBRIDEMENT LEFT LEG WOUND  WOUND VAC PLACEMENT    Past Medical History:   Diagnosis Date     A-fib (H)      Bradycardia     PPM 2008     Cardiac pacemaker in situ 2014    2008 PPM Medtronic Sensia, model# SEDR01, serial# [id number]       Hearing loss      Heart murmur 2007    Overview:  Benign Echo     Hernia, abdominal      HTN (hypertension)      Hyperlipidemia with target LDL less than 130 2007    Overview:  ICD 10     Irregular heart beat     AFIB     Osteoarthritis of pelvic region 2008    Overview:  LW Modifier:  Rt, SHARON done 1/15/09 LW Onset:  5-6 years ; DJD Hip     Pacemaker      Palpitations      Primary cardiomyopathy (H) 2014     Problem list name updated by automated process. Provider to review     Prostate cancer (H) 2007    Overview:  [place] Score:5+5=1 Radiation Therapy x 25 + Radioactive Seed Implant     Sick sinus syndrome (H)      Syncope      Past Surgical History:   Procedure Laterality Date     CYSTOSCOPY       EP PPM GENERATOR CHANGE DUAL  2/10/16     HERNIA REPAIR       IMPLANT PACEMAKER  08    Medtronic Sensia, model# SEDR01, serial# [id number]     PROSTATE SURGERY         Anesthesia Evaluation     . Pt has had prior anesthetic.     No history of anesthetic complications          ROS/MED HX    ENT/Pulmonary:      (-) tobacco use, asthma and sleep apnea   Neurologic:      (-) seizures and CVA   Cardiovascular:     (+) hypertension----. : . . fainting (syncope). pacemaker :. Irregular Heartbeat/Palpitations, valvular problems/murmurs .       METS/Exercise Tolerance:     Hematologic:         Musculoskeletal:         GI/Hepatic:        (-) GERD   Renal/Genitourinary:         Endo:      (-) Type II DM and thyroid disease  "  Psychiatric:         Infectious Disease:         Malignancy:         Other:                          Physical Exam  Normal systems: dental    Airway   Mallampati: II  TM distance: >3 FB  Neck ROM: full    Dental     Cardiovascular   Rhythm and rate: regular and normal      Pulmonary    breath sounds clear to auscultation            Lab Results   Component Value Date    WBC 6.5 07/14/2020    HGB 11.7 (L) 07/14/2020    HCT 38.4 (L) 07/14/2020     07/14/2020    CRP 13.8 (H) 07/14/2020    SED 43 (H) 07/14/2020     07/14/2020    POTASSIUM 4.5 07/16/2020    CHLORIDE 105 07/14/2020    CO2 24 07/14/2020    BUN 20 07/14/2020    CR 1.08 07/14/2020     (H) 07/14/2020    ENEDELIA 8.7 07/14/2020    PTT 26 04/05/2008    INR 1.07 02/10/2016       Preop Vitals  BP Readings from Last 3 Encounters:   07/16/20 (!) 147/78   07/15/20 (!) 157/80   07/14/20 (!) 155/74    Pulse Readings from Last 3 Encounters:   07/16/20 85   07/15/20 82   07/14/20 74      Resp Readings from Last 3 Encounters:   07/16/20 18   07/15/20 18   07/14/20 18    SpO2 Readings from Last 3 Encounters:   07/16/20 95%   07/14/20 99%   06/04/20 97%      Temp Readings from Last 1 Encounters:   07/16/20 35.9  C (96.6  F) (Oral)    Ht Readings from Last 1 Encounters:   07/16/20 1.778 m (5' 10\")      Wt Readings from Last 1 Encounters:   07/16/20 81.4 kg (179 lb 8 oz)    Estimated body mass index is 25.76 kg/m  as calculated from the following:    Height as of this encounter: 1.778 m (5' 10\").    Weight as of this encounter: 81.4 kg (179 lb 8 oz).       Anesthesia Plan      History & Physical Review  History and physical reviewed and following examination; no interval change.    ASA Status:  3 .        Plan for MAC with Intravenous induction.   PONV prophylaxis:  Ondansetron (or other 5HT-3)  Magnet available        Postoperative Care  Postoperative pain management:  IV analgesics and Oral pain medications.      Consents  Anesthetic plan, risks, benefits " and alternatives discussed with:  Patient..                 Ceci Lai

## 2020-07-16 NOTE — TELEPHONE ENCOUNTER
Called and spoke to Dre he will get it ASAP or set up as the consignment one in OR they will follow up with Dr Orellana

## 2020-07-16 NOTE — ANESTHESIA CARE TRANSFER NOTE
Patient: Jose Rucker    Procedure(s):  IRRIGATION AND DEBRIDEMENT LEFT LEG WOUND  WOUND VAC PLACEMENT    Diagnosis: 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) [L95.052]  Diagnosis Additional Information: No value filed.    Anesthesia Type:   MAC     Note:  Airway :Face Mask  Patient transferred to:PACU  Comments: At end of procedure, spontaneous respirations, patient alert to voice, able to follow commands. Oxygen via facemask at 6 liters per minute to PACU. Oxygen tubing connected to wall O2 in PACU, SpO2, NiBP, and EKG monitors and alarms on and functioning, Kandis Hugger warmer connected to patient gown, report on patient's clinical status given to PACU RN, RN questions answered.Handoff Report: Identifed the Patient, Identified the Reponsible Provider, Reviewed the pertinent medical history, Discussed the surgical course, Reviewed Intra-OP anesthesia mangement and issues during anesthesia, Set expectations for post-procedure period and Allowed opportunity for questions and acknowledgement of understanding      Vitals: (Last set prior to Anesthesia Care Transfer)    CRNA VITALS  7/16/2020 1603 - 7/16/2020 1639      7/16/2020             Pulse:  69    SpO2:  99 %    Resp Rate (set):  10                Electronically Signed By: JENNY Davidson CRNA  July 16, 2020  4:39 PM

## 2020-07-16 NOTE — OR NURSING
Surgeon decided that pt could go home with a home wound vac.  Called [employer] to get all the equipment from .  Exchanged the wound vac's, and instructions called to wife.  Large box of equipment and  sent home with patient

## 2020-07-16 NOTE — DISCHARGE INSTRUCTIONS
Same Day Surgery Discharge Instructions for  Sedation and General Anesthesia       It's not unusual to feel dizzy, light-headed or faint for up to 24 hours after surgery or while taking pain medication.  If you have these symptoms: sit for a few minutes before standing and have someone assist you when you get up to walk or use the bathroom.      You should rest and relax for the next 24 hours. We recommend you make arrangements to have an adult stay with you for at least 24 hours after your discharge.  Avoid hazardous and strenuous activity.      DO NOT DRIVE any vehicle or operate mechanical equipment for 24 hours following the end of your surgery.  Even though you may feel normal, your reactions may be affected by the medication you have received.      Do not drink alcoholic beverages for 24 hours following surgery.       Slowly progress to your regular diet as you feel able. It's not unusual to feel nauseated and/or vomit after receiving anesthesia.  If you develop these symptoms, drink clear liquids (apple juice, ginger ale, broth, 7-up, etc. ) until you feel better.  If your nausea and vomiting persists for 24 hours, please notify your surgeon.        All narcotic pain medications, along with inactivity and anesthesia, can cause constipation. Drinking plenty of liquids and increasing fiber intake will help.      For any questions of a medical nature, call your surgeon.      Do not make important decisions for 24 hours.      If you had general anesthesia, you may have a sore throat for a couple of days related to the breathing tube used during surgery.  You may use Cepacol lozenges to help with this discomfort.  If it worsens or if you develop a fever, contact your surgeon.       If you feel your pain is not well managed with the pain medications prescribed by your surgeon, please contact your surgeon's office to let them know so they can address your concerns.       CoVid 19 Information    We want to give you  information regarding Covid. Please consult your primary care provider with any questions you might have.     Patient who have symptoms (cough, fever, or shortness of breath), need to isolate for 7 days from when symptoms started OR 72 hours after fever resolves (without fever reducing medications) AND improvement of respiratory symptoms (whichever is longer).      Isolate yourself at home (in own room/own bathroom if possible)    Do Not allow any visitors    Do Not go to work or school    Do Not go to [employer],  centers, shopping, or other public places.    Do Not shake hands.    Avoid close and intimate contact with others (hugging, kissing).    Follow CDC recommendations for household cleaning of frequently touched services.     After the initial 7 days, continue to isolate yourself from household members as much as possible. To continue decrease the risk of community spread and exposure, you and any members of your household should limit activities in public for 14 days after starting home isolation.     You can reference the following CDC link for helpful home isolation/care tips:  https://www.cdc.gov/coronavirus/2019-ncov/downloads/10Things.pdf    Protect Others:    Cover Your Mouth and Nose with a mask, disposable tissue or wash cloth to avoid spreading germs to others.    Wash your hands and face frequently with soap and water    Call Your Primary Doctor If: Breathing difficulty develops or you become worse.    For more information about COVID19 and options for caring for yourself at home, please visit the CDC website at https://www.cdc.gov/coronavirus/2019-ncov/about/steps-when-sick.html  For more options for care at [facility], please visit our website at [email or web address]      Home with Home KCI VAC, HomeCare VAC change Saturday July 18, 2020. in sponges in wound.      Going Home with A Wound Vac  When you are discharged you will be given box from [facility] with all  the supplies needed for home care. It will include dressing changing supplies and new canisters.  Dressing changes are usually done in the office during follow up visit and then about 3 times a week as long as the Wound Vac is in place.   You should not shower (or get dressing wet) unless this has been approved by your MD.  Keep the machine plugged in as much as possible to prevent battery depletion. The battery may last for about 12 hours.  At home: If there is a problem and the machine registers  air leak , it usually means the dressing is loose.  You may try to patch the dressing with new Tegaderm or clear KCI drape. If this does not work, call [facility] at [phone].  If you have home care, call the Home Care nurse with questions.    Your discharge nurse will instruct you before you go home on:    Plugging power cord into unit and power outlet    Turning unit on and off     Changing canisters (see below)    Importance of not altering therapy (leaving pressure settings and mode as prescribed)    Pausing prescribed therapy when changing canisters     Clamping tubing when changing canisters    Addressing possible alarms (leak, low battery or canister)     How to Change the Canister on the Wound VAC  1. Close white clamp on foam dressing tube.  2. Disconnect tubes from each other - hold canister tube up to allow drainage to flow down tube and into canister. Close clamp on canister tube.  3. Press therapy button to off.  4. Push in canister release button at front of machine and remove canister from machine. You may need to jiggle it out.  5. Insert a new canister into machine. Jiggle into place until you hear a click.  6. Hook tubes together, unclamp clamps.  7. Press therapy button; press therapy  on  to restart.    You should call your MD for a fever over 102 , increase in redness, swelling, bleeding, increase in pain, warmth around dressing site.  If you have a question or problem with the bandage call your  surgeon, home care nurse or wound care center.    For problems regarding your Wound VAC: call [facility] at [phone].    Reasons to contact your surgeon:    1. Signs of possible infection: Check your incision daily for redness, swelling, warmth, red streaks or foul drainage.   2. Elevated temperature.  3. Pain not controlled with pain medication and/or rest.   4. Uncontrolled nausea or vomiting.  5. Any questions or concerns.      **If you have questions or concerns about your procedure  call Dr Phil Orellana at [phone] **

## 2020-07-16 NOTE — OP NOTE
Procedure Date: July 16, 2020      SURGEON: Phil Orellana MD     ASSISTANT:  None.     PREOPERATIVE DIAGNOSIS:   1.  traumatic left leg wound, hematoma prior     POSTOPERATIVE DIAGNOSIS:   1.  same     PROCEDURES PERFORMED:  Debridement left lateral leg wound with sonic 1 utilizing hypochlorous acid and Prontosan, application of negative pressure wound therapy KCI specific with 2 pieces of silver sponge placed in the wound and undermining, wound dimensions approximately 20 x 10 cm.    ANESTHESIA: Mac with local     ESTIMATED BLOOD LOSS: 20 cc          DESCRIPTION OF PROCEDURE: Informed consent was obtained.  Patient was appropriately marked.  Patient was then taken to the operating room and placed in the supine position.  All pressure points were well-padded. The patient's left were then prepped and draped in a sterile fashion.  A timeout was performed.  1% lidocaine was infiltrated into the periwound margin.  The wound was explored, undermining identified, approximate wound dimensions were 10 x 20 cm.  Longitudinal incisions were performed proximal and distal to expose a large degree of the undermining and to allow easier access for application of negative pressure wound therapy sponges.  The wound was then excisionally debridement of skin, necrotic subcutaneous tissue, necrotic fat using knife curette of sonic 1 ultrasonic debridement, 1 bottle of hypochlorous acid, 1 bottle of Prontosan.  Hemostasis ensured throughout the operative site, Fabio was then applied.  A silver sponge was then cut in half, then sized to appropriate to the wound and undermining, then seal obtained at -125 mmHg, continuous.    The patient tolerated the procedure and anesthesia well and was brought back to the recovery room, vital signs stable and vascular status intact to recovery    DISPOSITION: discontinue home with home KCI VAC  WB status: full  Dressing: VAC  Antibiotics: Clinda started 7-  Phil Orellana MD  Pager  [phone]

## 2020-07-16 NOTE — TELEPHONE ENCOUNTER
[facility] Wound    Who is the name of the provider?:  Reyna      What is the location you see this provider at?: [place]    Reason for call:  Question re need by date - sates it is needed by today, 7/16 by 11am.      Can we leave a detailed message on this number?  YES

## 2020-07-17 ENCOUNTER — HOSPITAL ENCOUNTER (OUTPATIENT)
Dept: WOUND CARE | Facility: CLINIC | Age: 85
Discharge: HOME | End: 2020-07-17
Attending: SURGERY | Admitting: SURGERY
Payer: MEDICARE

## 2020-07-17 ENCOUNTER — TELEPHONE (OUTPATIENT)
Dept: WOUND CARE | Facility: CLINIC | Age: 85
End: 2020-07-17

## 2020-07-17 VITALS
DIASTOLIC BLOOD PRESSURE: 59 MMHG | TEMPERATURE: 96.8 F | SYSTOLIC BLOOD PRESSURE: 122 MMHG | RESPIRATION RATE: 18 BRPM | HEART RATE: 60 BPM

## 2020-07-17 DIAGNOSIS — L97.922 ULCER OF LEFT LOWER EXTREMITY WITH FAT LAYER EXPOSED (H): ICD-10-CM

## 2020-07-17 PROCEDURE — 97605 NEG PRS WND THER DME<=50SQCM: CPT

## 2020-07-17 NOTE — TELEPHONE ENCOUNTER
Daughter called and left message over night, vacuum pump stopped working for a few hours last night and they are concerned about the condition of the wound.

## 2020-07-17 NOTE — RESULT ENCOUNTER NOTE
Final wound culture report is NEGATIVE.    No treatment or change in treatment per [place] ED Lab Result protocol.

## 2020-07-17 NOTE — DISCHARGE INSTRUCTIONS
[facility]  [address] [facility] [address] [address]    Call us at [phone] if you have any questions about your wounds, have redness or swelling around your wound, have a fever of 101 or greater or if you have any other problems or concerns. We answer the phone Monday through Friday 8 am to 4 pm, please leave a message as we check the voicemail frequently throughout the day.     Jose Rucker      1931    Medications/supplements to aid in healin. 1 packet of Dorian Supplement into your favorite beverage TWICE a day  purchase [facility] in [address]  2. Vitamin D3 10,000 iu per day.  3. Vitamin C 2,000 mg daily  4. Folate 400 mg daily   5. Vitamin B 12 1000 mcg daily      Wound Dressing Change:Left Leg  After cleansing with saline or wound cleanser, apply small amount of VASHE on gauze, lay into wound bed, let sit for 10 minutes, remove gauze (do not rinse) then apply dressing.  Skin Care: Use Skin Prep to anamaria-wound skin than hydrocolloid on raw skin on anterior shin  NPWT using black foam into undermining of 4.0 cm at 2 oclock and pressure set to 125mmHg continuous suction.  Change dressing MWF.   Compression:  Apply clean compression Edemawear first thing in the morning leave on until next dressing change. Pull Tubing out through the edemawear.   Remove compression dressing if toes turn blue and/or start to feel numb and is not relieved by elevating the leg for one hour.      ARABELLA Orellana M.D.. 2020

## 2020-07-17 NOTE — PROVIDER NOTIFICATION
Wife Shahla states that they had problem with the wound vac all night.  They called the vac rep several times through the night.  They will be going to the wound clinic today to have the dressing change and new machine put on, and instructions.  With Covid rulings,  the family wasn't brought in for instruction on the machine.

## 2020-07-17 NOTE — PROGRESS NOTES
[facility] [place] Nurse Note    Subject: Jose Rucker presents for nurse only visit for wound vac dressing change, he called this morning saying his wound vac was alarming all night. He was unable to have home care come out today and so it was felt best to have him come in to clinic to have it changed. He had debridement left lateral leg wound with sonic 1 utilizing hypochlorous acid and Prontosan, application of negative pressure wound therapy KCI by Dr. Orellana yesterday PM.   Exam:  /59   Pulse 60   Temp 96.8  F (36  C) (Temporal)   Resp 18   Wound (used by OP [facility] only) 07/15/20 1216 Left lateral;lower leg trauma (Active)   Length (cm) 9.6 07/17/20 1200   Width (cm) 3.3 07/17/20 1200   Depth (cm) 0.7 07/17/20 1200   Wound (cm^2) 31.68 cm^2 07/17/20 1200   Wound Volume (cm^3) 22.18 cm^3 07/17/20 1200   Wound healing % -255.56 07/17/20 1200   Undermining [Depth (cm)/Location] 1-3 oclock, depth 4 07/17/20 1200   Dressing Appearance moist drainage 07/17/20 1200   Drainage Characteristics/Odor serosanguineous 07/17/20 1200   Drainage Amount large 07/17/20 1200   Thickness/Stage full thickness 07/17/20 1200   Base red/granulating 07/17/20 1200   Periwound intact 07/17/20 1200   Periwound Temperature warm 07/17/20 1200   Periwound Skin Turgor soft 07/17/20 1200   Edges open 07/17/20 1200   Care, Wound non-select wound debridement performed;negative pressure wound therapy 07/17/20 1200     Procedure:  Topical anesthetic of 4% lidocaine was applied,non-selective debridement was performed, no bleeding occurred. Patient tolerated procedure well.  Procedure: Wound was redressed with skin prep to periwound margins, hydrocolloid to anterior shin raw skin, black sponge 2 pieces into undermining, and bridged anterior  NPWT @ 125mmhg with good seal and no leaks.    Plan: Patient will go to home medical store and buy vashe and edemawear, home care will change dressing 3x per week until he returns to  the clinic in 2 weeks time.  Chloe Lawler, FRANCAN, RN, CWOCN  July 17, 2020

## 2020-07-17 NOTE — ANESTHESIA POSTPROCEDURE EVALUATION
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    Anesthesia Type:  MAC    Note:  Anesthesia Post Evaluation    Patient location during evaluation: PACU  Patient participation: Able to fully participate in evaluation  Level of consciousness: awake and alert  Pain management: adequate  Airway patency: patent  Cardiovascular status: acceptable  Respiratory status: acceptable  Hydration status: acceptable  PONV: none     Anesthetic complications: None          Last vitals:  Vitals:    07/16/20 1715 07/16/20 1730 07/16/20 1745   BP: 136/77 (!) 140/66 (!) 152/67   Pulse: 60 60 61   Resp: 19 19 11   Temp:      SpO2: 100% 99% 98%         Electronically Signed By: Cornell Rodriguez MD  July 16, 2020  7:35 PM

## 2020-07-17 NOTE — TELEPHONE ENCOUNTER
They attempted to change canisters x3 last night after speaking with the company. Attempted to have home care go out to see patient to change dressing. She could not guarantee that he would have a visit today. Spoke with patients wife, they will come into clinic to have dressing changed and still have home care come out tomorrow to do intake/inital visit but they will not change dressing until Monday.

## 2020-07-20 ENCOUNTER — TELEPHONE (OUTPATIENT)
Dept: WOUND CARE | Facility: CLINIC | Age: 85
End: 2020-07-20

## 2020-07-20 LAB
BACTERIA SPEC CULT: NO GROWTH
BACTERIA SPEC CULT: NO GROWTH
SPECIMEN SOURCE: NORMAL
SPECIMEN SOURCE: NORMAL

## 2020-07-20 NOTE — TELEPHONE ENCOUNTER
Mary Jane RN from [facility] calling for wound vac change orders 3 times weekly for 5 weeks and PRN. Patient has no PCP and last saw an NP over a year ago. Okay to leave message [phone]

## 2020-07-23 ENCOUNTER — TELEPHONE (OUTPATIENT)
Dept: WOUND CARE | Facility: CLINIC | Age: 85
End: 2020-07-23

## 2020-07-23 NOTE — TELEPHONE ENCOUNTER
Discussion with spouse about use of Immodium; eating foods to thicken the stool; yogurt, BRAT diet etc. And call the pharmacy if they have any other recommendations.     She will call back with any concerns.     Completed.

## 2020-07-23 NOTE — TELEPHONE ENCOUNTER
Patient had surgery last week. Has been on Clindamycin and will finish his last does tomorrow. Patient has had diarrhea the last few days and has been taking imodium per the directions on the box. Should he take more?     Wife Shahla [phone]

## 2020-07-28 ENCOUNTER — ANCILLARY PROCEDURE (OUTPATIENT)
Dept: CARDIOLOGY | Facility: CLINIC | Age: 85
End: 2020-07-28
Attending: INTERNAL MEDICINE
Payer: MEDICARE

## 2020-07-28 DIAGNOSIS — Z95.0 CARDIAC PACEMAKER IN SITU: ICD-10-CM

## 2020-07-28 DIAGNOSIS — I49.5 SSS (SICK SINUS SYNDROME) (H): ICD-10-CM

## 2020-07-28 LAB
MDC_IDC_LEAD_IMPLANT_DT: NORMAL
MDC_IDC_LEAD_IMPLANT_DT: NORMAL
MDC_IDC_LEAD_LOCATION: NORMAL
MDC_IDC_LEAD_LOCATION: NORMAL
MDC_IDC_LEAD_MFG: NORMAL
MDC_IDC_LEAD_MFG: NORMAL
MDC_IDC_LEAD_MODEL: NORMAL
MDC_IDC_LEAD_MODEL: NORMAL
MDC_IDC_LEAD_POLARITY_TYPE: NORMAL
MDC_IDC_LEAD_POLARITY_TYPE: NORMAL
MDC_IDC_LEAD_SERIAL: NORMAL
MDC_IDC_LEAD_SERIAL: NORMAL
MDC_IDC_MSMT_BATTERY_DTM: NORMAL
MDC_IDC_MSMT_BATTERY_IMPEDANCE: 188 OHM
MDC_IDC_MSMT_BATTERY_REMAINING_LONGEVITY: 134 MO
MDC_IDC_MSMT_BATTERY_STATUS: NORMAL
MDC_IDC_MSMT_BATTERY_VOLTAGE: 2.79 V
MDC_IDC_MSMT_LEADCHNL_RA_IMPEDANCE_VALUE: 396 OHM
MDC_IDC_MSMT_LEADCHNL_RA_PACING_THRESHOLD_AMPLITUDE: 0.5 V
MDC_IDC_MSMT_LEADCHNL_RA_PACING_THRESHOLD_PULSEWIDTH: 0.4 MS
MDC_IDC_MSMT_LEADCHNL_RV_IMPEDANCE_VALUE: 397 OHM
MDC_IDC_MSMT_LEADCHNL_RV_PACING_THRESHOLD_AMPLITUDE: 1 V
MDC_IDC_MSMT_LEADCHNL_RV_PACING_THRESHOLD_PULSEWIDTH: 0.4 MS
MDC_IDC_PG_IMPLANT_DTM: NORMAL
MDC_IDC_PG_MFG: NORMAL
MDC_IDC_PG_MODEL: NORMAL
MDC_IDC_PG_SERIAL: NORMAL
MDC_IDC_PG_TYPE: NORMAL
MDC_IDC_SESS_CLINIC_NAME: NORMAL
MDC_IDC_SESS_DTM: NORMAL
MDC_IDC_SESS_TYPE: NORMAL
MDC_IDC_SET_BRADY_AT_MODE_SWITCH_MODE: NORMAL
MDC_IDC_SET_BRADY_AT_MODE_SWITCH_RATE: 175 {BEATS}/MIN
MDC_IDC_SET_BRADY_LOWRATE: 60 {BEATS}/MIN
MDC_IDC_SET_BRADY_MAX_SENSOR_RATE: 130 {BEATS}/MIN
MDC_IDC_SET_BRADY_MAX_TRACKING_RATE: 130 {BEATS}/MIN
MDC_IDC_SET_BRADY_MODE: NORMAL
MDC_IDC_SET_BRADY_PAV_DELAY_LOW: 150 MS
MDC_IDC_SET_BRADY_SAV_DELAY_LOW: 120 MS
MDC_IDC_SET_LEADCHNL_RA_PACING_AMPLITUDE: 1.5 V
MDC_IDC_SET_LEADCHNL_RA_PACING_CAPTURE_MODE: NORMAL
MDC_IDC_SET_LEADCHNL_RA_PACING_POLARITY: NORMAL
MDC_IDC_SET_LEADCHNL_RA_PACING_PULSEWIDTH: 0.4 MS
MDC_IDC_SET_LEADCHNL_RA_SENSING_POLARITY: NORMAL
MDC_IDC_SET_LEADCHNL_RA_SENSING_SENSITIVITY: 0.5 MV
MDC_IDC_SET_LEADCHNL_RV_PACING_AMPLITUDE: 2 V
MDC_IDC_SET_LEADCHNL_RV_PACING_CAPTURE_MODE: NORMAL
MDC_IDC_SET_LEADCHNL_RV_PACING_POLARITY: NORMAL
MDC_IDC_SET_LEADCHNL_RV_PACING_PULSEWIDTH: 0.4 MS
MDC_IDC_SET_LEADCHNL_RV_SENSING_POLARITY: NORMAL
MDC_IDC_SET_LEADCHNL_RV_SENSING_SENSITIVITY: 5.6 MV
MDC_IDC_SET_ZONE_DETECTION_INTERVAL: 333.33 MS
MDC_IDC_SET_ZONE_DETECTION_INTERVAL: 342.86 MS
MDC_IDC_SET_ZONE_TYPE: NORMAL
MDC_IDC_SET_ZONE_TYPE: NORMAL
MDC_IDC_STAT_AT_BURDEN_PERCENT: 0.1 %
MDC_IDC_STAT_AT_DTM_END: NORMAL
MDC_IDC_STAT_AT_DTM_START: NORMAL
MDC_IDC_STAT_AT_MODE_SW_COUNT: 115
MDC_IDC_STAT_BRADY_AP_VP_PERCENT: 0 %
MDC_IDC_STAT_BRADY_AP_VS_PERCENT: 98 %
MDC_IDC_STAT_BRADY_AS_VP_PERCENT: 0 %
MDC_IDC_STAT_BRADY_AS_VS_PERCENT: 1 %
MDC_IDC_STAT_BRADY_DTM_END: NORMAL
MDC_IDC_STAT_BRADY_DTM_START: NORMAL
MDC_IDC_STAT_EPISODE_RECENT_COUNT: 0
MDC_IDC_STAT_EPISODE_RECENT_COUNT: 50
MDC_IDC_STAT_EPISODE_RECENT_COUNT_DTM_END: NORMAL
MDC_IDC_STAT_EPISODE_RECENT_COUNT_DTM_END: NORMAL
MDC_IDC_STAT_EPISODE_RECENT_COUNT_DTM_START: NORMAL
MDC_IDC_STAT_EPISODE_RECENT_COUNT_DTM_START: NORMAL
MDC_IDC_STAT_EPISODE_TYPE: NORMAL
MDC_IDC_STAT_EPISODE_TYPE: NORMAL

## 2020-07-28 PROCEDURE — 93296 REM INTERROG EVL PM/IDS: CPT | Performed by: INTERNAL MEDICINE

## 2020-07-28 PROCEDURE — 93294 REM INTERROG EVL PM/LDLS PM: CPT | Performed by: INTERNAL MEDICINE

## 2020-07-30 ENCOUNTER — HOSPITAL ENCOUNTER (OUTPATIENT)
Dept: WOUND CARE | Facility: CLINIC | Age: 85
Discharge: HOME | End: 2020-07-30
Attending: SURGERY | Admitting: SURGERY
Payer: MEDICARE

## 2020-07-30 VITALS — SYSTOLIC BLOOD PRESSURE: 121 MMHG | TEMPERATURE: 96.2 F | RESPIRATION RATE: 16 BRPM | DIASTOLIC BLOOD PRESSURE: 70 MMHG

## 2020-07-30 DIAGNOSIS — L97.922 ULCER OF LEFT LOWER EXTREMITY WITH FAT LAYER EXPOSED (H): ICD-10-CM

## 2020-07-30 PROCEDURE — 97597 DBRDMT OPN WND 1ST 20 CM/<: CPT

## 2020-07-30 PROCEDURE — 97602 WOUND(S) CARE NON-SELECTIVE: CPT

## 2020-07-30 PROCEDURE — 99213 OFFICE O/P EST LOW 20 MIN: CPT | Performed by: SURGERY

## 2020-07-30 NOTE — PROGRESS NOTES
[facility] [place] Progress Note    Subject: Jose Rucker returns status post debridement of left lateral calf, traumatically induced, subcutaneous hematoma with subsequent necrosis of skin.  2 weeks of negative pressure wound therapy utilization.  Patient denies pain.    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)     Past Medical History:   Diagnosis Date     A-fib (H)      Bradycardia     PPM 5/27/2008     Cardiac pacemaker in situ 9/17/2014 2008 PPM Med[employer]onic Sensia, model# SEDR01, serial# [id number]       Hearing loss      Heart murmur 5/23/2007    Overview:  Benign Echo     Hernia, abdominal      HTN (hypertension)      Hyperlipidemia with target LDL less than 130 5/23/2007    Overview:  ICD 10     Irregular heart beat     AFIB     Osteoarthritis of pelvic region 11/12/2008    Overview:  LW Modifier:  Rt, SHARON done 1/15/09 LW Onset:  5-6 years ; DJD Hip     Pacemaker      Palpitations      Primary cardiomyopathy (H) 11/4/2014     Problem list name updated by automated process. Provider to review     Prostate cancer (H) 6/26/2007    Overview:  Mekhi Score:5+5=1 Radiation Therapy x 25 + Radioactive Seed Implant     Sick sinus syndrome (H)      Syncope      Exam:  /70   Temp 96.2  F (35.7  C) (Temporal)   Resp 16   Wound (used by OP [facility] only) 07/15/20 1216 Left lateral;lower leg trauma (Active)   Length (cm) 9.1 07/30/20 0900   Width (cm) 3 07/30/20 0900   Depth (cm) 0.5 07/30/20 0900   Wound (cm^2) 27.3 cm^2  07/30/20 0900   Wound Volume (cm^3) 13.65 cm^3 07/30/20 0900   Wound healing % -206.4 07/30/20 0900   Undermining [Depth (cm)/Location] 12-3 Oclock/ 0.5 depth/ 4-6 Oclock/ 1.5 depth 07/30/20 0900   Dressing Appearance intact 07/30/20 0900   Drainage Characteristics/Odor serosanguineous 07/30/20 0900   Drainage Amount moderate 07/30/20 0900   Thickness/Stage full thickness 07/30/20 0900   Base red/granulating 07/30/20 0900   Periwound intact 07/30/20 0900   Periwound Temperature warm 07/30/20 0900   Periwound Skin Turgor soft 07/30/20 0900   Edges open 07/30/20 0900   Care, Wound debrided 07/30/20 0900     89-year-old male, small amount of undermining at the 10 o'clock position, wound bed is otherwise progressive granulation tissue, no odor.        Impression: Traumatically induced left lower extremity ulcerations as well surgical debridement 2 weeks prior, application of negative pressure wound therapy    Plan: We will dress the wounds with hypochlorous acid moist soaks for 15 minutes, application of endoform, edema wear, change 3 times per week with home care.  Discontinue negative pressure wound therapy.  Continue micronutrient adjunct of therapy including Dorian, vitamin D, vitamin C..  Patient will return to the clinic in 4 weeks time    Phil Orellana MD on 7/30/2020 at 10:28 AM

## 2020-07-30 NOTE — DISCHARGE INSTRUCTIONS
[facility]  [address] [facility] [address] [address]    Call us at [phone] if you have any questions about your wounds, have redness or swelling around your wound, have a fever of 101 or greater or if you have any other problems or concerns. We answer the phone Monday through Friday 8 am to 4 pm, please leave a message as we check the voicemail frequently throughout the day.     Jose Rucker      1931  [facility] Phone:[phone] Fax: [phone]    Medications/supplements to aid in healin packet of Dorian Supplement into your favorite beverage TWICE a day purchase  [facility] in [address]  Vitamin D3 10,000 iu per day.  Vitamin C 2,000 mg daily  Folate 400 mg daily  Vitamin B 12 1000 mcg daily  Wound Dressing Change:Left Leg  After cleansing with saline or wound cleanser, apply small amount of VASHE on gauze,  lay into wound bed, let sit for 10 minutes, remove gauze (do not rinse) then apply dressing.  Apply Endoform antimicrobial to wound base, apply edemawear, than apply abd pad and secure with roll gauze and tape  Compression: Apply clean compression Edemawear first thing in the morning leave on until next dressing change. Apply Spandigrip E on top of edemawear for containing the swelling.  Remove compression dressing if toes turn blue and/or start to feel numb and is not relieved by elevating the leg for one hour.     ARABELLA Orellana M.D.. 2020

## 2020-08-07 ENCOUNTER — TELEPHONE (OUTPATIENT)
Dept: WOUND CARE | Facility: CLINIC | Age: 85
End: 2020-08-07

## 2020-08-07 NOTE — TELEPHONE ENCOUNTER
Mary Jane is questioning dressing the wound in endoform, she is concerned it is too dry for that.  Best contact is cell .

## 2020-08-07 NOTE — TELEPHONE ENCOUNTER
Left message for Mary Jane to moisten the endoform with saline with dressing changes. Ok to add saline damp gauze on top of endoform on top of wound to provide the semi moist wound environment for wound healing.

## 2020-08-14 ENCOUNTER — TELEPHONE (OUTPATIENT)
Facility: CLINIC | Age: 85
End: 2020-08-14

## 2020-08-14 NOTE — TELEPHONE ENCOUNTER
..[facility] now requests orders and shares plan of care/discharge summaries for some patients through [employer].  Please REPLY TO THIS MESSAGE OR ROUTE BACK TO THE AUTHOR in order to give authorization for orders when needed.  This is considered a verbal order, you will still receive a faxed copy of orders for signature.  Thank you for your assistance in improving collaboration for our patients.    ORDER    Wound is drier . Can we add plurogel to endoform 3x per week?

## 2020-08-19 ENCOUNTER — TELEPHONE (OUTPATIENT)
Dept: WOUND CARE | Facility: CLINIC | Age: 85
End: 2020-08-19

## 2020-08-19 NOTE — TELEPHONE ENCOUNTER
Mary Jane? The [facility] Nurse needs nursing orders to continue 3 times a week.   Patient is coming in next week.  Her voicemail is confidential so you can leave her a message for her to continue as she has been doing.

## 2020-08-26 ENCOUNTER — HOSPITAL ENCOUNTER (OUTPATIENT)
Dept: WOUND CARE | Facility: CLINIC | Age: 85
Discharge: HOME | End: 2020-08-26
Attending: SURGERY | Admitting: SURGERY
Payer: MEDICARE

## 2020-08-26 VITALS
RESPIRATION RATE: 16 BRPM | HEART RATE: 94 BPM | SYSTOLIC BLOOD PRESSURE: 147 MMHG | DIASTOLIC BLOOD PRESSURE: 82 MMHG | TEMPERATURE: 96.3 F

## 2020-08-26 DIAGNOSIS — L97.922 ULCER OF LEFT LOWER EXTREMITY WITH FAT LAYER EXPOSED (H): ICD-10-CM

## 2020-08-26 PROCEDURE — 11042 DBRDMT SUBQ TIS 1ST 20SQCM/<: CPT | Performed by: SURGERY

## 2020-08-26 PROCEDURE — 11042 DBRDMT SUBQ TIS 1ST 20SQCM/<: CPT

## 2020-08-26 NOTE — PROGRESS NOTES
[facility] [place] Progress Note    Subject: Jose Rucker traumatic hematoma left lateral calf, status post incision and drainage in the operating room, progressive granulation tissue development, transition to endoform, utilizing EdemaWear for dermal lymphatic management.  Denies pain, denies fevers chills or sweats.  On chronic anticoagulation for atrial fibrillation    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)     Past Medical History:   Diagnosis Date     A-fib (H)      Bradycardia     PPM 5/27/2008     Cardiac pacemaker in situ 9/17/2014 2008 PPM Medtronic Sensia, model# SEDR01, serial# [id number]       Hearing loss      Heart murmur 5/23/2007    Overview:  Benign Echo     Hernia, abdominal      HTN (hypertension)      Hyperlipidemia with target LDL less than 130 5/23/2007    Overview:  ICD 10     Irregular heart beat     AFIB     Osteoarthritis of pelvic region 11/12/2008    Overview:  LW Modifier:  Rt, SHARON done 1/15/09 LW Onset:  5-6 years ; DJD Hip     Pacemaker      Palpitations      Primary cardiomyopathy (H) 11/4/2014     Problem list name updated by automated process. Provider to review     Prostate cancer (H) 6/26/2007    Overview:  M[place]hi Score:5+5=1 Radiation Therapy x 25 + Radioactive Seed Implant     Sick sinus syndrome (H)      Syncope      Exam:  BP (!) 147/82   Pulse 94   Temp 96.3  F (35.7  C) (Temporal)   Resp 16   Wound (used by OP [facility] only) 07/15/20 1216 Left lateral;lower leg trauma (Active)    Length (cm) 8.5 08/26/20 1300   Width (cm) 2.2 08/26/20 1300   Depth (cm) 0.3 08/26/20 1300   Wound (cm^2) 18.7 cm^2 08/26/20 1300   Wound Volume (cm^3) 5.61 cm^3 08/26/20 1300   Wound healing % -109.88 08/26/20 1300   Dressing Appearance moist drainage 08/26/20 1300   Drainage Characteristics/Odor serosanguineous 08/26/20 1300   Drainage Amount moderate 08/26/20 1300   Thickness/Stage full thickness 08/26/20 1300     89-year-old male, biofilm present left lateral calf wound, removed with knife curette after application of 4% lidocaine topically, no bone or tendon exposure, no undermining, progressive granulation tissue present with encroaching epithelialization.  No erythema, no cellulitis or erysipelas.  Palpable left dorsalis pedis pulse.    Procedure:   Patient was determined to be capable of making their own medical decisions and informed consent was obtained. Topical anesthetic of 4% lidocaine was applied, debridement was performed using a #15 blade down to and including subcutaneous tissue and biofilm.  Bleeding controlled with light pressure. Patient tolerated procedure well.    Impression: Traumatically induced hematoma left lateral calf status post incision and drainage, history of chronic atrial fibrillation on anticoagulation    Plan: We will dress the wounds with antimicrobial endoform, edema wear, wash wound with hypochlorous acid Vashe at dressing changes, continue adjunct of micronutrient therapy..  Patient will return to the clinic in 6 weeks time    Phil Orellana MD on 8/26/2020 at 2:52 PM

## 2020-08-26 NOTE — DISCHARGE INSTRUCTIONS
[facility]  [address] [place] [address] [address]    Call us at [phone] if you have any questions about your wounds, have redness or swelling around your wound, have a fever of 101 or greater or if you have any other problems or concerns. We answer the phone Monday through Friday 8 am to 4 pm, please leave a message as we check the voicemail frequently throughout the day.     Jose Rucker      1931    [facility] Phone:[phone] Fax: [phone]    Medications/supplements to aid in healin packet of Dorian Supplement into your favorite beverage TWICE a day purchase  [facility] in [address]  Vitamin D3 10,000 iu per day.  Vitamin C 2,000 mg daily  Folate 400 mg daily  Vitamin B 12 1000 mcg daily  Wound Dressing Change:Left Leg  After cleansing with saline or wound cleanser, apply small amount of VASHE on gauze,lay into wound bed, let sit for 10 minutes, remove gauze (do not rinse) then apply dressing.  Apply Endoform antimicrobial to wound base, apply edemawear, than apply abd pad and secure with roll gauze and tape.  Change dressing daily  Compression: Apply clean compression Edemawear first thing in the morning leave on until next dressing change.      ARABELLA Orellana M.D.. 2020

## 2020-08-27 NOTE — ADDENDUM NOTE
Encounter addended by: Chloe Lawler RN on: 8/27/2020 8:19 AM   Actions taken: Charge Capture section accepted

## 2020-08-28 ENCOUNTER — TELEPHONE (OUTPATIENT)
Dept: WOUND CARE | Facility: CLINIC | Age: 85
End: 2020-08-28

## 2020-08-28 NOTE — TELEPHONE ENCOUNTER
Mary Jane called questioning change to wound care orders from 3 times a week to daily.  Wanting to verify that is correct.

## 2020-08-28 NOTE — TELEPHONE ENCOUNTER
IF possible would ideally done daily, so they will need to teach his wife to do dressings daily. She will instruct his wife

## 2020-09-14 ENCOUNTER — TELEPHONE (OUTPATIENT)
Dept: WOUND CARE | Facility: CLINIC | Age: 85
End: 2020-09-14

## 2020-09-14 NOTE — TELEPHONE ENCOUNTER
Verbal orders given to Mary Jane to do home care visits once a week for 6 weeks, wife will do dressings on other days.

## 2020-09-16 PROCEDURE — G0179 MD RECERTIFICATION HHA PT: HCPCS | Performed by: SURGERY

## 2020-09-29 DIAGNOSIS — Z53.9 DIAGNOSIS NOT YET DEFINED: Primary | ICD-10-CM

## 2020-10-06 ENCOUNTER — HOSPITAL ENCOUNTER (OUTPATIENT)
Dept: WOUND CARE | Facility: CLINIC | Age: 85
Discharge: HOME | End: 2020-10-06
Attending: SURGERY | Admitting: SURGERY
Payer: MEDICARE

## 2020-10-06 VITALS
SYSTOLIC BLOOD PRESSURE: 157 MMHG | DIASTOLIC BLOOD PRESSURE: 89 MMHG | RESPIRATION RATE: 16 BRPM | TEMPERATURE: 95.7 F | HEART RATE: 89 BPM

## 2020-10-06 DIAGNOSIS — L97.922 ULCER OF LEFT LOWER EXTREMITY WITH FAT LAYER EXPOSED (H): ICD-10-CM

## 2020-10-06 PROCEDURE — 97597 DBRDMT OPN WND 1ST 20 CM/<: CPT | Performed by: SURGERY

## 2020-10-06 PROCEDURE — 99212 OFFICE O/P EST SF 10 MIN: CPT | Performed by: SURGERY

## 2020-10-06 NOTE — PROGRESS NOTES
[facility] [place] Progress Note    Subject: Jose Rucker traumatic left leg wound, hematoma status post surgical debridement July 16, 2020.  Progressive wound closure.    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)     Past Medical History:   Diagnosis Date     A-fib (H)      Bradycardia     PPM 5/27/2008     Cardiac pacemaker in situ 9/17/2014 2008 PPM Medtronic Sensia, model# SEDR01, serial# [id number]       Hearing loss      Heart murmur 5/23/2007    Overview:  Benign Echo     Hernia, abdominal      HTN (hypertension)      Hyperlipidemia with target LDL less than 130 5/23/2007    Overview:  ICD 10     Irregular heart beat     AFIB     Osteoarthritis of pelvic region 11/12/2008    Overview:  LW Modifier:  Rt, SHARON done 1/15/09 LW Onset:  5-6 years ; DJD Hip     Pacemaker      Palpitations      Primary cardiomyopathy (H) 11/4/2014     Problem list name updated by automated process. Provider to review     Prostate cancer (H) 6/26/2007    Overview:  Armona Score:5+5=1 Radiation Therapy x 25 + Radioactive Seed Implant     Sick sinus syndrome (H)      Syncope      Exam:  BP (!) 157/89   Pulse 89   Temp 95.7  F (35.4  C) (Temporal)   Resp 16   Wound (used by OP [facility] only) 07/15/20 1216 Left lateral;lower leg trauma (Active)   Dressing Appearance moist drainage 10/06/20 1000   Length (cm) 5.5 10/06/20 1000   Width (cm) 1 10/06/20 1000   Depth (cm) 0.1 10/06/20 1000   Wound (cm^2) 5.5 cm^2 10/06/20 1000   Wound Volume (cm^3) 0.55  cm^3 10/06/20 1000   Wound healing % 38.27 10/06/20 1000   Drainage Characteristics/Odor serosanguineous 10/06/20 1000   Drainage Amount moderate 10/06/20 1000   Care, Wound debrided 10/06/20 1000     89-year-old male, wound is improved by an additional approximate 40%, biofilm and periwound hyperkeratotic tissue removed with a knife curette after application of 4% lidocaine, hemostasis effect patient pressure, patient tolerated procedure well without complications.  Interstitial edema reduction with utilization of edema wear and compression.        Impression: Resolving traumatically induced left leg hematoma    Plan: We will dress the wounds with hypochlorous acid washes daily, application of endoform antimicrobial, edema wear, outer layer of inelastic compression  Patient will return to the clinic in 4 weeks time    Phil Orlelana MD on 10/6/2020 at 11:10 AM

## 2020-10-06 NOTE — DISCHARGE INSTRUCTIONS
[facility]  [address] [address]    Call us at [phone] if you have any questions about your wounds, have redness or swelling around your wound, have a fever of 101 or greater or if you have any other problems or concerns. We answer the phone Monday through Friday 8 am to 4 pm, please leave a message as we check the voicemail frequently throughout the day.     Jose Rucker      1931  [facility] Phone:[phone] Fax: [phone]  Medications/supplements to aid in healin packet of Dorian Supplement into your favorite beverage TWICE a day- ok to stop   Vitamin D3 10,000 iu per day.  Vitamin C 2,000 mg daily  Folate 400 mg daily  Vitamin B 12 1000 mcg daily  Wound Dressing Change:Left Leg until healed than transition to smooth toe compression sock  After cleansing with saline or wound cleanser, apply small amount of VASHE on gauze,lay into wound bed, let sit for 10 minutes, remove gauze (do not rinse) then apply dressing.  Apply Endoform antimicrobial to wound base, apply edemawear, than apply abd pad and secure with roll gauze and tape.  Change dressing daily  Compression: Apply clean compression Edemawear first thing in the morning leave on until next dressing change.  Order 15-20 mmhg compression sock from [email or web address]     ARABELLA Orellana M.D.. 2020

## 2020-10-09 DIAGNOSIS — I48.0 PAROXYSMAL ATRIAL FIBRILLATION (H): ICD-10-CM

## 2020-10-23 ENCOUNTER — TELEPHONE (OUTPATIENT)
Facility: CLINIC | Age: 85
End: 2020-10-23

## 2020-10-23 NOTE — TELEPHONE ENCOUNTER
HCN Text photo to say that pt is being discharged from home care due to wound being healed. Appointment scheduled for 11/09 was canceled.

## 2020-11-09 ENCOUNTER — NURSE TRIAGE (OUTPATIENT)
Dept: INTERNAL MEDICINE | Facility: CLINIC | Age: 85
End: 2020-11-09

## 2020-11-09 NOTE — TELEPHONE ENCOUNTER
"Wife calls to report that patient has dementia and some days he is able to be up and around walking but has more episodes of \"shuffling\" his feet and she is concerned that he may fall.  Today he is up ambulating independently without difficulty.  Sometimes lately he has even been out raking leaves.  He does have occasional episodes where he seems to be shuffling but not moving anywhere as if he forgot how to walk.  Wife states if she didn't assist him to a chair she is concerned that he would fall. He has had no recent falls but she worries that he could.  He last fell in May of 2020 when he stood on a turned over bucket and fell off of it.  He is verbal and independent but very [place].  He mainly sees cardiologist Dr. Ya who prescribes his meds.  Last seen about 1 year ago, has appt scheduled for Nov. 30th at cardiology.  Needs to establish care as previous provider retired.  Transferred to schedulers to set up appt and advised wife to have patient seen in  if there are any problems before IM appt.  FRANKLIN Lomeli R.N.    "

## 2020-11-10 ENCOUNTER — OFFICE VISIT (OUTPATIENT)
Dept: INTERNAL MEDICINE | Facility: CLINIC | Age: 85
End: 2020-11-10
Payer: MEDICARE

## 2020-11-10 VITALS
TEMPERATURE: 98.4 F | BODY MASS INDEX: 25.25 KG/M2 | RESPIRATION RATE: 16 BRPM | OXYGEN SATURATION: 95 % | DIASTOLIC BLOOD PRESSURE: 72 MMHG | HEART RATE: 85 BPM | WEIGHT: 176 LBS | SYSTOLIC BLOOD PRESSURE: 124 MMHG

## 2020-11-10 DIAGNOSIS — I95.1 ORTHOSTATIC HYPOTENSION: Primary | ICD-10-CM

## 2020-11-10 DIAGNOSIS — I10 ESSENTIAL HYPERTENSION, BENIGN: ICD-10-CM

## 2020-11-10 DIAGNOSIS — I48.0 PAROXYSMAL ATRIAL FIBRILLATION (H): ICD-10-CM

## 2020-11-10 DIAGNOSIS — R06.02 SHORTNESS OF BREATH: ICD-10-CM

## 2020-11-10 PROCEDURE — 99203 OFFICE O/P NEW LOW 30 MIN: CPT | Performed by: INTERNAL MEDICINE

## 2020-11-10 RX ORDER — LISINOPRIL 10 MG/1
5 TABLET ORAL DAILY
Qty: 45 TABLET | Refills: 3
Start: 2020-11-10 | End: 2020-11-30

## 2020-11-10 RX ORDER — FUROSEMIDE 40 MG
40 TABLET ORAL DAILY
Qty: 30 TABLET | Refills: 0 | Status: SHIPPED | OUTPATIENT
Start: 2020-11-10 | End: 2020-11-30

## 2020-11-10 RX ORDER — METOPROLOL SUCCINATE 50 MG/1
25 TABLET, EXTENDED RELEASE ORAL 2 TIMES DAILY
Qty: 90 TABLET | Refills: 3
Start: 2020-11-10 | End: 2020-11-30

## 2020-11-10 NOTE — PROGRESS NOTES
"Subjective     Jose Rucker is a 89 year old male who presents to clinic today for the following health issues:    HPI       Chief Complaint   Patient presents with     [facility]     Patient was under the care of Cathy Jones at [facility]. Patient gets Rxs from Dr. Ya, Cardiologist.      Weakness     Patient c/o weeakness in Legs     Jose presents today to \[facility]\"" care (his previous provider retired) and with complaints of recent \"almost falls\" as his wife describes them. His wife is present today.  She describes 2 events in the last week in which she could hear Jose shuffling more than normal and so she got up to find him in anticipation of something bad happening and was able to catch him before he completely fell.  However, right before she was able to catch Jose his legs gave out and he fell to his knees on the ground.  Unfortunately, Jose does not recall these events at all due to what his wife thinks is some underlying dementia.  He denies any numbness or tingling in his legs.  He denies feeling weak in his legs.  He really has no complaints today.  His wife does state that these near falls happened shortly after he stood up from a sitting position.  She also states that he had wound care come into their house over the last few months and when they took his blood pressure it would be low and they would always ask if he felt lightheaded.  No new medication changes recently.  He has an extensive cardiac history it sounds like and follows with a cardiologist and a rhythm team.    Review of Systems   Constitutional, HEENT, cardiovascular, pulmonary, gi and gu systems are negative, except as otherwise noted.      Objective    /72   Pulse 85   Temp 98.4  F (36.9  C) (Temporal)   Resp 16   Wt 79.8 kg (176 lb)   SpO2 95%   BMI 25.25 kg/m    Body mass index is 25.25 kg/m .  Physical Exam   GENERAL: alert and in no distress.  EYES: conjunctivae/corneas clear. " EOMs grossly intact  HENT: NC/AT, facies symmetric. Neck supple. No cervical LAD appreciated.  RESP: CTAB, no w/r/r.  CV: RRR, no m/r/g.  GI: NT, ND, no organomegaly, without rebound tenderness or guarding  MSK: No edema. No cyanosis or clubbing noted bilaterally in upper and/or lower extremities.  SKIN: No significant ulcers, lesions, or rashes on the visualized portions of the skin  NEURO: Alert. Oriented. Sensation grossly WNL.  Unable to get up from chair without using his arms/hands.    No results found for this or any previous visit (from the past 24 hour(s)).        Assessment & Plan     Orthostatic hypotension  Jose's recent leg weakness and falls sound like orthostatic hypotension as they happen right after he stands up, no LoC. He's unfortunately unable to remember if he's lightheaded or not as he doesn't remember the events in general due to some underlying dementia. Report from his wife that wound care was concerned about some low BP over the summer also fits. I'm going to cut his lisinopril and metoprolol both in half to see if this helps prevent these symptoms from occurring. He has cards f/u soon and I encouraged them to mention these episodes to their cardiologist. His wife will continue to check his BP with the decreased dosages and let us know what his BP does.  - lisinopril (ZESTRIL) 10 MG tablet; Take 0.5 tablets (5 mg) by mouth daily  - metoprolol succinate ER (TOPROL-XL) 50 MG 24 hr tablet; Take 0.5 tablets (25 mg) by mouth 2 times daily    Essential hypertension, benign  Meds adjusted as above.  - lisinopril (ZESTRIL) 10 MG tablet; Take 0.5 tablets (5 mg) by mouth daily    Paroxysmal atrial fibrillation (H)  Rate control adjusted as above. Has cards follow-up this month.  - metoprolol succinate ER (TOPROL-XL) 50 MG 24 hr tablet; Take 0.5 tablets (25 mg) by mouth 2 times daily    Shortness of breath  Requested refill of Lasix, usually managed by cards but they will run out prior to appt later  this month. I rx'd 30 day supply to get them to the appt. Appreciate cards' input and help here.  - furosemide (LASIX) 40 MG tablet; Take 1 tablet (40 mg) by mouth daily      Return in about 3 months (around 2/10/2021) for Re-check.    Mikey Lemus MD  [facility]

## 2020-11-10 NOTE — PATIENT INSTRUCTIONS
- We are going to change his blood pressure regimen:  CHANGE lisinopril from 10mg daily to 5mg daily (so it'll go from 1 pill to 0.5 pill)  CHANGE metoprolol from 50mg daily to 25mg daily (so it'll go from 1 pill to 0.5 pill)    -MONITOR his blood pressure and let us know if it gets too high (above 180/120)    -Tell the device clinic tomorrow about these falls

## 2020-11-11 ENCOUNTER — DOCUMENTATION ONLY (OUTPATIENT)
Dept: CARDIOLOGY | Facility: CLINIC | Age: 85
End: 2020-11-11

## 2020-11-11 ENCOUNTER — ANCILLARY PROCEDURE (OUTPATIENT)
Dept: CARDIOLOGY | Facility: CLINIC | Age: 85
End: 2020-11-11
Attending: INTERNAL MEDICINE
Payer: MEDICARE

## 2020-11-11 DIAGNOSIS — Z95.0 PACEMAKER: ICD-10-CM

## 2020-11-11 DIAGNOSIS — I49.5 SICK SINUS SYNDROME (H): Primary | ICD-10-CM

## 2020-11-11 DIAGNOSIS — I10 ESSENTIAL HYPERTENSION, BENIGN: ICD-10-CM

## 2020-11-11 PROCEDURE — 93280 PM DEVICE PROGR EVAL DUAL: CPT | Performed by: INTERNAL MEDICINE

## 2020-11-12 LAB
MDC_IDC_LEAD_IMPLANT_DT: NORMAL
MDC_IDC_LEAD_IMPLANT_DT: NORMAL
MDC_IDC_LEAD_LOCATION: NORMAL
MDC_IDC_LEAD_LOCATION: NORMAL
MDC_IDC_LEAD_MFG: NORMAL
MDC_IDC_LEAD_MFG: NORMAL
MDC_IDC_LEAD_MODEL: NORMAL
MDC_IDC_LEAD_MODEL: NORMAL
MDC_IDC_LEAD_POLARITY_TYPE: NORMAL
MDC_IDC_LEAD_POLARITY_TYPE: NORMAL
MDC_IDC_LEAD_SERIAL: NORMAL
MDC_IDC_LEAD_SERIAL: NORMAL
MDC_IDC_MSMT_BATTERY_DTM: NORMAL
MDC_IDC_MSMT_BATTERY_IMPEDANCE: 236 OHM
MDC_IDC_MSMT_BATTERY_REMAINING_LONGEVITY: 125 MO
MDC_IDC_MSMT_BATTERY_STATUS: NORMAL
MDC_IDC_MSMT_BATTERY_VOLTAGE: 2.78 V
MDC_IDC_MSMT_LEADCHNL_RA_IMPEDANCE_VALUE: 413 OHM
MDC_IDC_MSMT_LEADCHNL_RA_PACING_THRESHOLD_AMPLITUDE: 0.5 V
MDC_IDC_MSMT_LEADCHNL_RA_PACING_THRESHOLD_AMPLITUDE: 0.5 V
MDC_IDC_MSMT_LEADCHNL_RA_PACING_THRESHOLD_PULSEWIDTH: 0.4 MS
MDC_IDC_MSMT_LEADCHNL_RA_PACING_THRESHOLD_PULSEWIDTH: 0.4 MS
MDC_IDC_MSMT_LEADCHNL_RA_SENSING_INTR_AMPL: 4 MV
MDC_IDC_MSMT_LEADCHNL_RV_IMPEDANCE_VALUE: 418 OHM
MDC_IDC_MSMT_LEADCHNL_RV_PACING_THRESHOLD_AMPLITUDE: 1.12 V
MDC_IDC_MSMT_LEADCHNL_RV_PACING_THRESHOLD_AMPLITUDE: 1.25 V
MDC_IDC_MSMT_LEADCHNL_RV_PACING_THRESHOLD_PULSEWIDTH: 0.4 MS
MDC_IDC_MSMT_LEADCHNL_RV_PACING_THRESHOLD_PULSEWIDTH: 0.4 MS
MDC_IDC_PG_IMPLANT_DTM: NORMAL
MDC_IDC_PG_MFG: NORMAL
MDC_IDC_PG_MODEL: NORMAL
MDC_IDC_PG_SERIAL: NORMAL
MDC_IDC_PG_TYPE: NORMAL
MDC_IDC_SESS_CLINIC_NAME: NORMAL
MDC_IDC_SESS_DTM: NORMAL
MDC_IDC_SESS_TYPE: NORMAL
MDC_IDC_SET_BRADY_AT_MODE_SWITCH_MODE: NORMAL
MDC_IDC_SET_BRADY_AT_MODE_SWITCH_RATE: 175 {BEATS}/MIN
MDC_IDC_SET_BRADY_LOWRATE: 60 {BEATS}/MIN
MDC_IDC_SET_BRADY_MAX_SENSOR_RATE: 130 {BEATS}/MIN
MDC_IDC_SET_BRADY_MAX_TRACKING_RATE: 130 {BEATS}/MIN
MDC_IDC_SET_BRADY_MODE: NORMAL
MDC_IDC_SET_BRADY_PAV_DELAY_LOW: 150 MS
MDC_IDC_SET_BRADY_SAV_DELAY_LOW: 120 MS
MDC_IDC_SET_LEADCHNL_RA_PACING_AMPLITUDE: 1.5 V
MDC_IDC_SET_LEADCHNL_RA_PACING_CAPTURE_MODE: NORMAL
MDC_IDC_SET_LEADCHNL_RA_PACING_POLARITY: NORMAL
MDC_IDC_SET_LEADCHNL_RA_PACING_PULSEWIDTH: 0.4 MS
MDC_IDC_SET_LEADCHNL_RA_SENSING_POLARITY: NORMAL
MDC_IDC_SET_LEADCHNL_RA_SENSING_SENSITIVITY: 0.5 MV
MDC_IDC_SET_LEADCHNL_RV_PACING_AMPLITUDE: 2.25 V
MDC_IDC_SET_LEADCHNL_RV_PACING_CAPTURE_MODE: NORMAL
MDC_IDC_SET_LEADCHNL_RV_PACING_POLARITY: NORMAL
MDC_IDC_SET_LEADCHNL_RV_PACING_PULSEWIDTH: 0.4 MS
MDC_IDC_SET_LEADCHNL_RV_SENSING_POLARITY: NORMAL
MDC_IDC_SET_LEADCHNL_RV_SENSING_SENSITIVITY: 5.6 MV
MDC_IDC_SET_ZONE_DETECTION_INTERVAL: 333.33 MS
MDC_IDC_SET_ZONE_DETECTION_INTERVAL: 342.86 MS
MDC_IDC_SET_ZONE_TYPE: NORMAL
MDC_IDC_SET_ZONE_TYPE: NORMAL
MDC_IDC_STAT_AT_BURDEN_PERCENT: 0.1 %
MDC_IDC_STAT_AT_DTM_END: NORMAL
MDC_IDC_STAT_AT_DTM_START: NORMAL
MDC_IDC_STAT_AT_MODE_SW_COUNT: 132
MDC_IDC_STAT_BRADY_AP_VP_PERCENT: 0 %
MDC_IDC_STAT_BRADY_AP_VS_PERCENT: 98 %
MDC_IDC_STAT_BRADY_AS_VP_PERCENT: 0 %
MDC_IDC_STAT_BRADY_AS_VS_PERCENT: 1 %
MDC_IDC_STAT_BRADY_DTM_END: NORMAL
MDC_IDC_STAT_BRADY_DTM_START: NORMAL
MDC_IDC_STAT_EPISODE_RECENT_COUNT: 0
MDC_IDC_STAT_EPISODE_RECENT_COUNT: 58
MDC_IDC_STAT_EPISODE_RECENT_COUNT_DTM_END: NORMAL
MDC_IDC_STAT_EPISODE_RECENT_COUNT_DTM_END: NORMAL
MDC_IDC_STAT_EPISODE_RECENT_COUNT_DTM_START: NORMAL
MDC_IDC_STAT_EPISODE_RECENT_COUNT_DTM_START: NORMAL
MDC_IDC_STAT_EPISODE_TYPE: NORMAL
MDC_IDC_STAT_EPISODE_TYPE: NORMAL

## 2020-11-23 ENCOUNTER — HOSPITAL ENCOUNTER (OUTPATIENT)
Dept: CARDIOLOGY | Facility: CLINIC | Age: 85
Discharge: HOME | End: 2020-11-23
Attending: INTERNAL MEDICINE | Admitting: INTERNAL MEDICINE
Payer: MEDICARE

## 2020-11-23 DIAGNOSIS — I10 ESSENTIAL HYPERTENSION, BENIGN: ICD-10-CM

## 2020-11-23 DIAGNOSIS — I42.9 CARDIOMYOPATHY, UNSPECIFIED TYPE (H): ICD-10-CM

## 2020-11-23 DIAGNOSIS — I48.0 PAROXYSMAL ATRIAL FIBRILLATION (H): ICD-10-CM

## 2020-11-23 DIAGNOSIS — E78.5 HYPERLIPIDEMIA LDL GOAL <70: ICD-10-CM

## 2020-11-23 LAB
ALT SERPL W P-5'-P-CCNC: 25 U/L (ref 0–70)
ANION GAP SERPL CALCULATED.3IONS-SCNC: 6 MMOL/L (ref 3–14)
BUN SERPL-MCNC: 19 MG/DL (ref 7–30)
CALCIUM SERPL-MCNC: 8.9 MG/DL (ref 8.5–10.1)
CHLORIDE SERPL-SCNC: 102 MMOL/L (ref 94–109)
CHOLEST SERPL-MCNC: 199 MG/DL
CO2 SERPL-SCNC: 26 MMOL/L (ref 20–32)
CREAT SERPL-MCNC: 1.19 MG/DL (ref 0.66–1.25)
GFR SERPL CREATININE-BSD FRML MDRD: 54 ML/MIN/{1.73_M2}
GLUCOSE SERPL-MCNC: 103 MG/DL (ref 70–99)
HDLC SERPL-MCNC: 54 MG/DL
LDLC SERPL CALC-MCNC: 114 MG/DL
NONHDLC SERPL-MCNC: 145 MG/DL
POTASSIUM SERPL-SCNC: 3.8 MMOL/L (ref 3.4–5.3)
SODIUM SERPL-SCNC: 134 MMOL/L (ref 133–144)
TRIGL SERPL-MCNC: 154 MG/DL

## 2020-11-23 PROCEDURE — 84460 ALANINE AMINO (ALT) (SGPT): CPT | Performed by: INTERNAL MEDICINE

## 2020-11-23 PROCEDURE — 93306 TTE W/DOPPLER COMPLETE: CPT | Mod: 26 | Performed by: INTERNAL MEDICINE

## 2020-11-23 PROCEDURE — 255N000002 HC RX 255 OP 636: Performed by: INTERNAL MEDICINE

## 2020-11-23 PROCEDURE — 36415 COLL VENOUS BLD VENIPUNCTURE: CPT | Performed by: INTERNAL MEDICINE

## 2020-11-23 PROCEDURE — 93306 TTE W/DOPPLER COMPLETE: CPT

## 2020-11-23 PROCEDURE — 80061 LIPID PANEL: CPT | Performed by: INTERNAL MEDICINE

## 2020-11-23 PROCEDURE — 80048 BASIC METABOLIC PNL TOTAL CA: CPT | Performed by: INTERNAL MEDICINE

## 2020-11-23 RX ADMIN — HUMAN ALBUMIN MICROSPHERES AND PERFLUTREN 3 ML: 10; .22 INJECTION, SOLUTION INTRAVENOUS at 11:00

## 2020-11-25 DIAGNOSIS — E78.5 HYPERLIPIDEMIA LDL GOAL <70: ICD-10-CM

## 2020-11-25 RX ORDER — SIMVASTATIN 20 MG
20 TABLET ORAL AT BEDTIME
Qty: 90 TABLET | Refills: 0 | Status: SHIPPED | OUTPATIENT
Start: 2020-11-25 | End: 2020-11-30

## 2020-11-30 ENCOUNTER — OFFICE VISIT (OUTPATIENT)
Dept: CARDIOLOGY | Facility: CLINIC | Age: 85
End: 2020-11-30
Payer: MEDICARE

## 2020-11-30 VITALS
SYSTOLIC BLOOD PRESSURE: 122 MMHG | HEART RATE: 63 BPM | WEIGHT: 174.7 LBS | HEIGHT: 68 IN | DIASTOLIC BLOOD PRESSURE: 74 MMHG | BODY MASS INDEX: 26.48 KG/M2 | OXYGEN SATURATION: 98 %

## 2020-11-30 DIAGNOSIS — I42.9 CARDIOMYOPATHY, UNSPECIFIED TYPE (H): ICD-10-CM

## 2020-11-30 DIAGNOSIS — I42.8 OTHER CARDIOMYOPATHY (H): ICD-10-CM

## 2020-11-30 DIAGNOSIS — R06.02 SHORTNESS OF BREATH: ICD-10-CM

## 2020-11-30 DIAGNOSIS — I10 ESSENTIAL HYPERTENSION, BENIGN: ICD-10-CM

## 2020-11-30 DIAGNOSIS — Z95.0 PACEMAKER: ICD-10-CM

## 2020-11-30 DIAGNOSIS — E78.5 HYPERLIPIDEMIA LDL GOAL <70: ICD-10-CM

## 2020-11-30 DIAGNOSIS — I49.5 SICK SINUS SYNDROME (H): ICD-10-CM

## 2020-11-30 DIAGNOSIS — I48.0 PAROXYSMAL ATRIAL FIBRILLATION (H): Primary | ICD-10-CM

## 2020-11-30 PROCEDURE — 93000 ELECTROCARDIOGRAM COMPLETE: CPT | Performed by: INTERNAL MEDICINE

## 2020-11-30 PROCEDURE — 99214 OFFICE O/P EST MOD 30 MIN: CPT | Performed by: INTERNAL MEDICINE

## 2020-11-30 RX ORDER — LISINOPRIL 2.5 MG/1
2.5 TABLET ORAL DAILY
Qty: 90 TABLET | Refills: 3 | Status: SHIPPED | OUTPATIENT
Start: 2020-11-30 | End: 2021-11-19

## 2020-11-30 RX ORDER — LISINOPRIL 2.5 MG/1
5 TABLET ORAL DAILY
Qty: 90 TABLET | Refills: 3 | Status: SHIPPED | OUTPATIENT
Start: 2020-11-30 | End: 2020-11-30

## 2020-11-30 RX ORDER — SIMVASTATIN 20 MG
20 TABLET ORAL AT BEDTIME
Qty: 90 TABLET | Refills: 3 | Status: SHIPPED | OUTPATIENT
Start: 2020-11-30 | End: 2021-11-19

## 2020-11-30 RX ORDER — FUROSEMIDE 40 MG
40 TABLET ORAL DAILY
Qty: 90 TABLET | Refills: 3 | Status: SHIPPED | OUTPATIENT
Start: 2020-11-30 | End: 2021-11-19

## 2020-11-30 RX ORDER — METOPROLOL SUCCINATE 25 MG/1
25 TABLET, EXTENDED RELEASE ORAL 2 TIMES DAILY
Qty: 180 TABLET | Refills: 3 | Status: SHIPPED | OUTPATIENT
Start: 2020-11-30 | End: 2021-11-19

## 2020-11-30 ASSESSMENT — MIFFLIN-ST. JEOR: SCORE: 1431.93

## 2020-11-30 NOTE — PROGRESS NOTES
Service Date: 11/30/2020      HISTORY OF PRESENT ILLNESS:  It is my pleasure to see your patient, Jose Rucker.  This is a patient with a history of sick sinus syndrome, paroxysmal atrial fibrillation and cardiomyopathy.  He also has a history of hyperlipidemia.  This patient has a permanent pacemaker in situ for treatment of sick sinus syndrome.  He has a mild to moderate dilatation of the aortic root.      Since I last saw him, the only issue that his wife has mentioned is he has been feeling somewhat weak and they have checked blood pressures at home under a portable automated blood pressure monitor showing blood pressures sometimes dropping into the 70s.  Most of the time, the blood pressures are greater than 100, however.  The dose of metoprolol was halved and the dose of lisinopril was also halved so he is now on 25 mg twice a day of metoprolol succinate and 5 mg per day of lisinopril.  He feels somewhat better with those doses being reduced.  However, his blood pressure is still running somewhat on the lower side.  This morning, his blood pressure was fine at 122/74.      Echocardiography was performed to follow his cardiomyopathy and dilated ascending aorta.  This was performed 1 week ago.  This showed that the ejection fraction is normal at 55%-60%.  There is mild to moderate basal septal hypertrophy, most consistent with a sigmoid septum.  This is similar to previously.  I reviewed both echoes in their entirety and the degree of thickening of the basal septum is the same as before and there is no evidence of obstruction.  The aortic root and ascending aorta appear to be unchanged from previously.  He has mild tricuspid regurgitation, mild aortic regurgitation and trivial mitral regurgitation.      A 12-lead electrocardiogram today showed the patient is atrial paced and ventricularly sensed.  Interrogation of the patient's permanent pacemaker was performed 19 days ago, and this showed that the patient  has only had 8 mode switches.  The last time he was in atrial fibrillation was in mid September, and his heart rate was between 90 and 100 at that stage and he was asymptomatic.  There is 10.5 years left on his battery.      IMPRESSION:   1.  Atrial fibrillation.  The patient is asymptomatic with atrial fibrillation, anticoagulated with apixaban without bleeding issues.  The majority of the time, the patient is in sinus rhythm.   2.  Cardiomyopathy.  The patient's ejection fraction is normal.   3.  Episodes of weakness, which appear to be due to hypotension.  Symptoms have improved with reducing the doses of the metoprolol and lisinopril.  However, he still gets some lower blood pressure readings.   4.  Mild to moderate dilatation of the aortic root at 4.4 cm, which is unchanged from previously, and borderline dilatation of the ascending aorta on echocardiography.   5.  Basal septal thickening consistent with sigmoid septum, which we see in elderly patients.  This probably does not represent hypertrophic cardiomyopathy and there is no evidence of obstruction.   6.  Normally functioning permanent pacemaker.      PLAN:     1.  We will continue the reduced dose of metoprolol and we will continue the dose of 25 mg twice a day of the succinate form of metoprolol.  I will reduce the dose of lisinopril down to 2.5 mg per day for better blood pressure control.  I would prefer to have more of the beta blocker present because of the atrial fibrillation.     2.  The Device Clinic will follow the permanent pacemaker independently.     3.  We will repeat his echocardiogram in 1 year's time to follow the aortic root and ascending aorta and his cardiomyopathy.     4.  We will recheck lipids again in a year.  He is within primary prevention guidelines based upon the results of 11/23/2020.     5.  Mild renal insufficiency on the basic metabolic profile that was performed 1 week ago.      As always, the patient has been told to  contact us if he has any questions or any concerns.  Also, I will have the patient follow up in 3 months' time to ensure that he is still doing well with the lower doses of our medications.      cc:   MD ROSALVA Henderson MD, [facility]             D: 2020   T: 2020   MT: christopher      Name:     IDANIA BEDOLLA   MRN:      [id number]        Account:      [id number]   :      [id number]           Service Date: 2020      Document: [id number]

## 2020-11-30 NOTE — LETTER
11/30/2020      Mikey Lemus MD  [address] [facility] [address]      RE: Jose Agarwalell       Dear Colleague,    I had the pleasure of seeing Jose Rucker in the [facility].    Service Date: 11/30/2020      HISTORY OF PRESENT ILLNESS:  It is my pleasure to see your patient, Jose Rucker.  This is a patient with a history of sick sinus syndrome, paroxysmal atrial fibrillation and cardiomyopathy.  He also has a history of hyperlipidemia.  This patient has a permanent pacemaker in situ for treatment of sick sinus syndrome.  He has a mild to moderate dilatation of the aortic root.      Since I last saw him, the only issue that his wife has mentioned is he has been feeling somewhat weak and they have checked blood pressures at home under a portable automated blood pressure monitor showing blood pressures sometimes dropping into the 70s.  Most of the time, the blood pressures are greater than 100, however.  The dose of metoprolol was halved and the dose of lisinopril was also halved so he is now on 25 mg twice a day of metoprolol succinate and 5 mg per day of lisinopril.  He feels somewhat better with those doses being reduced.  However, his blood pressure is still running somewhat on the lower side.  This morning, his blood pressure was fine at 122/74.      Echocardiography was performed to follow his cardiomyopathy and dilated ascending aorta.  This was performed 1 week ago.  This showed that the ejection fraction is normal at 55%-60%.  There is mild to moderate basal septal hypertrophy, most consistent with a sigmoid septum.  This is similar to previously.  I reviewed both echoes in their entirety and the degree of thickening of the basal septum is the same as before and there is no evidence of obstruction.  The aortic root and ascending aorta appear to be unchanged from previously.  He has mild tricuspid regurgitation, mild aortic regurgitation and trivial mitral  regurgitation.      A 12-lead electrocardiogram today showed the patient is atrial paced and ventricularly sensed.  Interrogation of the patient's permanent pacemaker was performed 19 days ago, and this showed that the patient has only had 8 mode switches.  The last time he was in atrial fibrillation was in mid September, and his heart rate was between 90 and 100 at that stage and he was asymptomatic.  There is 10.5 years left on his battery.      IMPRESSION:   1.  Atrial fibrillation.  The patient is asymptomatic with atrial fibrillation, anticoagulated with apixaban without bleeding issues.  The majority of the time, the patient is in sinus rhythm.   2.  Cardiomyopathy.  The patient's ejection fraction is normal.   3.  Episodes of weakness, which appear to be due to hypotension.  Symptoms have improved with reducing the doses of the metoprolol and lisinopril.  However, he still gets some lower blood pressure readings.   4.  Mild to moderate dilatation of the aortic root at 4.4 cm, which is unchanged from previously, and borderline dilatation of the ascending aorta on echocardiography.   5.  Basal septal thickening consistent with sigmoid septum, which we see in elderly patients.  This probably does not represent hypertrophic cardiomyopathy and there is no evidence of obstruction.   6.  Normally functioning permanent pacemaker.      PLAN:     1.  We will continue the reduced dose of metoprolol and we will continue the dose of 25 mg twice a day of the succinate form of metoprolol.  I will reduce the dose of lisinopril down to 2.5 mg per day for better blood pressure control.  I would prefer to have more of the beta blocker present because of the atrial fibrillation.     2.  [facility] will follow the permanent pacemaker independently.     3.  We will repeat his echocardiogram in 1 year's time to follow the aortic root and ascending aorta and his cardiomyopathy.     4.  We will recheck lipids again in a year.   He is within primary prevention guidelines based upon the results of 2020.     5.  Mild renal insufficiency on the basic metabolic profile that was performed 1 week ago.      As always, the patient has been told to contact us if he has any questions or any concerns.  Also, I will have the patient follow up in 3 months' time to ensure that he is still doing well with the lower doses of our medications.      cc:   MD ROSALVA Henderson MD, [facility]             D: 2020   T: 2020   MT: christopher      Name:     IDANIA BEDOLLA   MRN:      [id number]        Account:      [id number]   :      [id number]           Service Date: 2020      Document: [id number]            Outpatient Encounter Medications as of 2020   Medication Sig Dispense Refill     apixaban ANTICOAGULANT (ELIQUIS) 5 MG tablet Take 1 tablet (5 mg) by mouth 2 times daily 180 tablet 3     Diphenhydramine-APAP, sleep, (TYLENOL PM EXTRA STRENGTH PO) Take 500 mg by mouth daily       furosemide (LASIX) 40 MG tablet Take 1 tablet (40 mg) by mouth daily 90 tablet 3     lisinopril (ZESTRIL) 2.5 MG tablet Take 1 tablet (2.5 mg) by mouth daily 90 tablet 3     metoprolol succinate ER (TOPROL-XL) 25 MG 24 hr tablet Take 1 tablet (25 mg) by mouth 2 times daily 180 tablet 3     Multiple Minerals-Vitamins (PROSTEON PO) Take 500 mg by mouth 2 tablets twice daily       simvastatin (ZOCOR) 20 MG tablet Take 1 tablet (20 mg) by mouth At Bedtime 90 tablet 3     [DISCONTINUED] apixaban ANTICOAGULANT (ELIQUIS) 5 MG tablet Take 1 tablet (5 mg) by mouth 2 times daily 180 tablet 0     [DISCONTINUED] furosemide (LASIX) 40 MG tablet Take 1 tablet (40 mg) by mouth daily 30 tablet 0     [DISCONTINUED] lisinopril (ZESTRIL) 10 MG tablet Take 0.5 tablets (5 mg) by mouth daily 45 tablet 3     [DISCONTINUED] lisinopril (ZESTRIL) 2.5 MG tablet Take 2 tablets (5 mg) by mouth daily 90 tablet 3     [DISCONTINUED] metoprolol succinate ER  (TOPROL-XL) 50 MG 24 hr tablet Take 0.5 tablets (25 mg) by mouth 2 times daily 90 tablet 3     [DISCONTINUED] simvastatin (ZOCOR) 20 MG tablet Take 1 tablet (20 mg) by mouth At Bedtime 90 tablet 0     No facility-administered encounter medications on file as of 11/30/2020.        Again, thank you for allowing me to participate in the care of your patient.      Sincerely,    Mode Ya MD, MD     [facility] [place] [facility]

## 2020-11-30 NOTE — PROGRESS NOTES
HPI and Plan:   See dictation    Orders Placed This Encounter   Procedures     Basic metabolic panel     Basic metabolic panel     Lipid Profile     ALT     Follow-Up with Cardiac Advanced Practice Provider     Follow-Up with Cardiologist     EKG 12-lead complete w/read - Clinics (performed today)     EKG 12-lead complete w/read - Clinics (to be scheduled)     Echocardiogram Complete       Orders Placed This Encounter   Medications     DISCONTD: lisinopril (ZESTRIL) 2.5 MG tablet     Sig: Take 2 tablets (5 mg) by mouth daily     Dispense:  90 tablet     Refill:  3     apixaban ANTICOAGULANT (ELIQUIS) 5 MG tablet     Sig: Take 1 tablet (5 mg) by mouth 2 times daily     Dispense:  180 tablet     Refill:  3     furosemide (LASIX) 40 MG tablet     Sig: Take 1 tablet (40 mg) by mouth daily     Dispense:  90 tablet     Refill:  3     metoprolol succinate ER (TOPROL-XL) 25 MG 24 hr tablet     Sig: Take 1 tablet (25 mg) by mouth 2 times daily     Dispense:  180 tablet     Refill:  3     simvastatin (ZOCOR) 20 MG tablet     Sig: Take 1 tablet (20 mg) by mouth At Bedtime     Dispense:  90 tablet     Refill:  3     lisinopril (ZESTRIL) 2.5 MG tablet     Sig: Take 1 tablet (2.5 mg) by mouth daily     Dispense:  90 tablet     Refill:  3     Ignore previous prescription. This is the correct dosing.       Medications Discontinued During This Encounter   Medication Reason     apixaban ANTICOAGULANT (ELIQUIS) 5 MG tablet Reorder     lisinopril (ZESTRIL) 10 MG tablet Reorder     metoprolol succinate ER (TOPROL-XL) 50 MG 24 hr tablet Reorder     furosemide (LASIX) 40 MG tablet Reorder     simvastatin (ZOCOR) 20 MG tablet Reorder     lisinopril (ZESTRIL) 2.5 MG tablet Reorder         Encounter Diagnoses   Name Primary?     Paroxysmal atrial fibrillation (H) Yes     Hyperlipidemia LDL goal <70      Essential hypertension, benign      Shortness of breath      Other cardiomyopathy (H)      Cardiomyopathy, unspecified type (H)       Pacemaker      Sick sinus syndrome (H)        CURRENT MEDICATIONS:  Current Outpatient Medications   Medication Sig Dispense Refill     apixaban ANTICOAGULANT (ELIQUIS) 5 MG tablet Take 1 tablet (5 mg) by mouth 2 times daily 180 tablet 3     Diphenhydramine-APAP, sleep, (TYLENOL PM EXTRA STRENGTH PO) Take 500 mg by mouth daily       furosemide (LASIX) 40 MG tablet Take 1 tablet (40 mg) by mouth daily 90 tablet 3     lisinopril (ZESTRIL) 2.5 MG tablet Take 1 tablet (2.5 mg) by mouth daily 90 tablet 3     metoprolol succinate ER (TOPROL-XL) 25 MG 24 hr tablet Take 1 tablet (25 mg) by mouth 2 times daily 180 tablet 3     Multiple Minerals-Vitamins (PROSTEON PO) Take 500 mg by mouth 2 tablets twice daily       simvastatin (ZOCOR) 20 MG tablet Take 1 tablet (20 mg) by mouth At Bedtime 90 tablet 3       ALLERGIES     Allergies   Allergen Reactions     Penicillin G Rash     And patient had an awful smell        PAST MEDICAL HISTORY:  Past Medical History:   Diagnosis Date     A-fib (H)      Bradycardia     PPM 5/27/2008     Cardiac pacemaker in situ 9/17/2014 2008 PPM Medtronic Sensia, model# SEDR01, serial# [id number]       Hearing loss      Heart murmur 5/23/2007    Overview:  Benign Echo     Hernia, abdominal      HTN (hypertension)      Hyperlipidemia with target LDL less than 130 5/23/2007    Overview:  ICD 10     Irregular heart beat     AFIB     Osteoarthritis of pelvic region 11/12/2008    Overview:  LW Modifier:  Rt, SHARON done 1/15/09 LW Onset:  5-6 years ; DJD Hip     Pacemaker      Palpitations      Primary cardiomyopathy (H) 11/4/2014     Problem list name updated by automated process. Provider to review     Prostate cancer (H) 6/26/2007    Overview:  [place] Score:5+5=1 Radiation Therapy x 25 + Radioactive Seed Implant     Sick sinus syndrome (H)      Syncope        PAST SURGICAL HISTORY:  Past Surgical History:   Procedure Laterality Date     APPLY WOUND VAC Left 7/16/2020    Procedure: WOUND VAC PLACEMENT;   Surgeon: Phil Orellana MD;  Location:  OR     CYSTOSCOPY       EP PPM GENERATOR CHANGE DUAL  2/10/16     HERNIA REPAIR       IMPLANT PACEMAKER  08    Medtronic Sensia, model# SEDR01, serial# [id number]     IRRIGATION AND DEBRIDEMENT LOWER EXTREMITY, COMBINED Left 2020    Procedure: IRRIGATION AND DEBRIDEMENT LEFT LEG WOUND;  Surgeon: Phil Orellana MD;  Location: [facility] OR     PROSTATE SURGERY         FAMILY HISTORY:  Family History   Problem Relation Age of Onset     Heart Disease Mother        SOCIAL HISTORY:  Social History     Socioeconomic History     Marital status:      Spouse name: None     Number of children: None     Years of education: None     Highest education level: None   Occupational History     None   Social Needs     Financial resource strain: None     Food insecurity     Worry: None     Inability: None     Transportation needs     Medical: None     Non-medical: None   Tobacco Use     Smoking status: Former Smoker     Packs/day: 0.50     Years: 6.00     Pack years: 3.00     Types: Cigarettes     Quit date:      Years since quittin.9     Smokeless tobacco: Never Used   Substance and Sexual Activity     Alcohol use: Yes     Comment: 1 beer daily     Drug use: Never     Sexual activity: None   Lifestyle     Physical activity     Days per week: None     Minutes per session: None     Stress: None   Relationships     Social connections     Talks on phone: None     Gets together: None     Attends Amish service: None     Active member of club or organization: None     Attends meetings of clubs or organizations: None     Relationship status: None     Intimate partner violence     Fear of current or ex partner: None     Emotionally abused: None     Physically abused: None     Forced sexual activity: None   Other Topics Concern      Service Not Asked     Blood Transfusions Not Asked     Caffeine Concern No     Comment: 2 cups daily     Occupational Exposure Not Asked  "    Hobby Hazards Not Asked     Sleep Concern Not Asked     Stress Concern Not Asked     Weight Concern Not Asked     Special Diet No     Back Care Not Asked     Exercise No     Comment: some stairs, yardwork, uses exercise ball     Bike Helmet Not Asked     Seat Belt Not Asked     Self-Exams Not Asked     Parent/sibling w/ CABG, MI or angioplasty before 65F 55M? Not Asked   Social History Narrative     None       Review of Systems:  Skin:  Negative       Eyes:  Positive for glasses    ENT:  Positive for hearing loss wears hearing aides  Respiratory:  Negative       Cardiovascular:  Negative      Gastroenterology: Negative      Genitourinary:  Positive for incontinence;urgency hard to make it to the bathroom on time  Musculoskeletal:  Positive for arthritis    Neurologic:  Positive for numbness or tingling of hands    Psychiatric:  Negative      Heme/Lymph/Imm:  Negative      Endocrine:  Negative        Physical Exam:  Vitals: /74   Pulse 63   Ht 1.727 m (5' 8\")   Wt 79.2 kg (174 lb 11.2 oz)   SpO2 98%   BMI 26.56 kg/m      Constitutional:  cooperative, alert and oriented, well developed, well nourished, in no acute distress;cooperative overweight;frail      Skin:  warm and dry to the touch, no apparent skin lesions or masses noted   pacemaker incision in the left infraclavicular area was well-healed      Head:  normocephalic, no masses or lesions        Eyes:  pupils equal and round, conjunctivae and lids unremarkable, sclera white, no xanthalasma, EOMS intact, no nystagmus        Lymph:      ENT:  no pallor or cyanosis, dentition good        Neck:  carotid pulses are full and equal bilaterally, JVP normal, no carotid bruit        Respiratory:  normal breath sounds, clear to auscultation, normal A-P diameter, normal symmetry, normal respiratory excursion, no use of accessory muscles (Occassional coarse crackles at right base.)       Cardiac: regular rhythm, normal S1/S2, no S3 or S4, apical impulse not " displaced, no murmurs, gallops or rubs                pulses full and equal                                        GI:  not assessed this visit        Extremities and Muscular Skeletal:  no deformities, clubbing, cyanosis, erythema observed;no edema stasis pigmentation            Neurological:  no gross motor deficits;affect appropriate        Psych:  Alert and Oriented x 3        CC  No referring provider defined for this encounter.

## 2020-11-30 NOTE — LETTER
11/30/2020    Mikey Lemus MD  [address] [facility] [address]    RE: Jose Rucker       Dear Colleague,    I had the pleasure of seeing Jose Rucker in the [facility].    HPI and Plan:   See dictation    Orders Placed This Encounter   Procedures     Basic metabolic panel     Basic metabolic panel     Lipid Profile     ALT     Follow-Up with Cardiac Advanced Practice Provider     Follow-Up with Cardiologist     EKG 12-lead complete w/read - Clinics (performed today)     EKG 12-lead complete w/read - Clinics (to be scheduled)     Echocardiogram Complete       Orders Placed This Encounter   Medications     DISCONTD: lisinopril (ZESTRIL) 2.5 MG tablet     Sig: Take 2 tablets (5 mg) by mouth daily     Dispense:  90 tablet     Refill:  3     apixaban ANTICOAGULANT (ELIQUIS) 5 MG tablet     Sig: Take 1 tablet (5 mg) by mouth 2 times daily     Dispense:  180 tablet     Refill:  3     furosemide (LASIX) 40 MG tablet     Sig: Take 1 tablet (40 mg) by mouth daily     Dispense:  90 tablet     Refill:  3     metoprolol succinate ER (TOPROL-XL) 25 MG 24 hr tablet     Sig: Take 1 tablet (25 mg) by mouth 2 times daily     Dispense:  180 tablet     Refill:  3     simvastatin (ZOCOR) 20 MG tablet     Sig: Take 1 tablet (20 mg) by mouth At Bedtime     Dispense:  90 tablet     Refill:  3     lisinopril (ZESTRIL) 2.5 MG tablet     Sig: Take 1 tablet (2.5 mg) by mouth daily     Dispense:  90 tablet     Refill:  3     Ignore previous prescription. This is the correct dosing.       Medications Discontinued During This Encounter   Medication Reason     apixaban ANTICOAGULANT (ELIQUIS) 5 MG tablet Reorder     lisinopril (ZESTRIL) 10 MG tablet Reorder     metoprolol succinate ER (TOPROL-XL) 50 MG 24 hr tablet Reorder     furosemide (LASIX) 40 MG tablet Reorder     simvastatin (ZOCOR) 20 MG tablet Reorder     lisinopril (ZESTRIL) 2.5 MG tablet Reorder         Encounter Diagnoses   Name  Primary?     Paroxysmal atrial fibrillation (H) Yes     Hyperlipidemia LDL goal <70      Essential hypertension, benign      Shortness of breath      Other cardiomyopathy (H)      Cardiomyopathy, unspecified type (H)      Pacemaker      Sick sinus syndrome (H)        CURRENT MEDICATIONS:  Current Outpatient Medications   Medication Sig Dispense Refill     apixaban ANTICOAGULANT (ELIQUIS) 5 MG tablet Take 1 tablet (5 mg) by mouth 2 times daily 180 tablet 3     Diphenhydramine-APAP, sleep, (TYLENOL PM EXTRA STRENGTH PO) Take 500 mg by mouth daily       furosemide (LASIX) 40 MG tablet Take 1 tablet (40 mg) by mouth daily 90 tablet 3     lisinopril (ZESTRIL) 2.5 MG tablet Take 1 tablet (2.5 mg) by mouth daily 90 tablet 3     metoprolol succinate ER (TOPROL-XL) 25 MG 24 hr tablet Take 1 tablet (25 mg) by mouth 2 times daily 180 tablet 3     Multiple Minerals-Vitamins (PROSTEON PO) Take 500 mg by mouth 2 tablets twice daily       simvastatin (ZOCOR) 20 MG tablet Take 1 tablet (20 mg) by mouth At Bedtime 90 tablet 3       ALLERGIES     Allergies   Allergen Reactions     Penicillin G Rash     And patient had an awful smell        PAST MEDICAL HISTORY:  Past Medical History:   Diagnosis Date     A-fib (H)      Bradycardia     PPM 5/27/2008     Cardiac pacemaker in situ 9/17/2014 2008 PPM Medtronic Sensia, model# SEDR01, serial# [id number]       Hearing loss      Heart murmur 5/23/2007    Overview:  Benign Echo     Hernia, abdominal      HTN (hypertension)      Hyperlipidemia with target LDL less than 130 5/23/2007    Overview:  ICD 10     Irregular heart beat     AFIB     Osteoarthritis of pelvic region 11/12/2008    Overview:  LW Modifier:  Rt, SHARON done 1/15/09 LW Onset:  5-6 years ; DJD Hip     Pacemaker      Palpitations      Primary cardiomyopathy (H) 11/4/2014     Problem list name updated by automated process. Provider to review     Prostate cancer (H) 6/26/2007    Overview:  [place] Score:5+5=1 Radiation Therapy x  25 + Radioactive Seed Implant     Sick sinus syndrome (H)      Syncope        PAST SURGICAL HISTORY:  Past Surgical History:   Procedure Laterality Date     APPLY WOUND VAC Left 2020    Procedure: WOUND VAC PLACEMENT;  Surgeon: Phil Orellana MD;  Location:  OR     CYSTOSCOPY       EP PPM GENERATOR CHANGE DUAL  2/10/16     HERNIA REPAIR       IMPLANT PACEMAKER  08    Medtronic Sensia, model# SEDR01, serial# [id number]     IRRIGATION AND DEBRIDEMENT LOWER EXTREMITY, COMBINED Left 2020    Procedure: IRRIGATION AND DEBRIDEMENT LEFT LEG WOUND;  Surgeon: Phil Orellana MD;  Location: [facility] OR     PROSTATE SURGERY         FAMILY HISTORY:  Family History   Problem Relation Age of Onset     Heart Disease Mother        SOCIAL HISTORY:  Social History     Socioeconomic History     Marital status:      Spouse name: None     Number of children: None     Years of education: None     Highest education level: None   Occupational History     None   Social Needs     Financial resource strain: None     Food insecurity     Worry: None     Inability: None     Transportation needs     Medical: None     Non-medical: None   Tobacco Use     Smoking status: Former Smoker     Packs/day: 0.50     Years: 6.00     Pack years: 3.00     Types: Cigarettes     Quit date:      Years since quittin.9     Smokeless tobacco: Never Used   Substance and Sexual Activity     Alcohol use: Yes     Comment: 1 beer daily     Drug use: Never     Sexual activity: None   Lifestyle     Physical activity     Days per week: None     Minutes per session: None     Stress: None   Relationships     Social connections     Talks on phone: None     Gets together: None     Attends Oriental orthodox service: None     Active member of club or organization: None     Attends meetings of clubs or organizations: None     Relationship status: None     Intimate partner violence     Fear of current or ex partner: None     Emotionally abused: None      "Physically abused: None     Forced sexual activity: None   Other Topics Concern      Service Not Asked     Blood Transfusions Not Asked     Caffeine Concern No     Comment: 2 cups daily     Occupational Exposure Not Asked     Hobby Hazards Not Asked     Sleep Concern Not Asked     Stress Concern Not Asked     Weight Concern Not Asked     Special Diet No     Back Care Not Asked     Exercise No     Comment: some stairs, yardwork, uses exercise ball     Bike Helmet Not Asked     Seat Belt Not Asked     Self-Exams Not Asked     Parent/sibling w/ CABG, MI or angioplasty before 65F 55M? Not Asked   Social History Narrative     None       Review of Systems:  Skin:  Negative       Eyes:  Positive for glasses    ENT:  Positive for hearing loss wears hearing aides  Respiratory:  Negative       Cardiovascular:  Negative      Gastroenterology: Negative      Genitourinary:  Positive for incontinence;urgency hard to make it to the bathroom on time  Musculoskeletal:  Positive for arthritis    Neurologic:  Positive for numbness or tingling of hands    Psychiatric:  Negative      Heme/Lymph/Imm:  Negative      Endocrine:  Negative        Physical Exam:  Vitals: /74   Pulse 63   Ht 1.727 m (5' 8\")   Wt 79.2 kg (174 lb 11.2 oz)   SpO2 98%   BMI 26.56 kg/m      Constitutional:  cooperative, alert and oriented, well developed, well nourished, in no acute distress;cooperative overweight;frail      Skin:  warm and dry to the touch, no apparent skin lesions or masses noted   pacemaker incision in the left infraclavicular area was well-healed      Head:  normocephalic, no masses or lesions        Eyes:  pupils equal and round, conjunctivae and lids unremarkable, sclera white, no xanthalasma, EOMS intact, no nystagmus        Lymph:      ENT:  no pallor or cyanosis, dentition good        Neck:  carotid pulses are full and equal bilaterally, JVP normal, no carotid bruit        Respiratory:  normal breath sounds, clear to " auscultation, normal A-P diameter, normal symmetry, normal respiratory excursion, no use of accessory muscles (Occassional coarse crackles at right base.)       Cardiac: regular rhythm, normal S1/S2, no S3 or S4, apical impulse not displaced, no murmurs, gallops or rubs                pulses full and equal                                        GI:  not assessed this visit        Extremities and Muscular Skeletal:  no deformities, clubbing, cyanosis, erythema observed;no edema stasis pigmentation            Neurological:  no gross motor deficits;affect appropriate        Psych:  Alert and Oriented x 3        CC  No referring provider defined for this encounter.                  Thank you for allowing me to participate in the care of your patient.      Sincerely,     Mode Ya MD, MD     [facility]    cc:   No referring provider defined for this encounter.

## 2021-01-22 ENCOUNTER — TELEPHONE (OUTPATIENT)
Dept: CARDIOLOGY | Facility: CLINIC | Age: 86
End: 2021-01-22

## 2021-01-22 NOTE — TELEPHONE ENCOUNTER
Patient's wife called to confirm dose of lisinopril. RN confirmed with patient's wife that lisinopril should be one 2.5mg daily tablet daily. Patient's wife verbalized understanding and has no further questions at this time.             11/30/20 OV Dr. Ya  PLAN:     1.  We will continue the reduced dose of metoprolol and we will continue the dose of 25 mg twice a day of the succinate form of metoprolol.  I will reduce the dose of lisinopril down to 2.5 mg per day for better blood pressure control.  I would prefer to have more of the beta blocker present because of the atrial fibrillation.     2.  The [facility] will follow the permanent pacemaker independently.     3.  We will repeat his echocardiogram in 1 year's time to follow the aortic root and ascending aorta and his cardiomyopathy.     4.  We will recheck lipids again in a year.  He is within primary prevention guidelines based upon the results of 11/23/2020.     5.  Mild renal insufficiency on the basic metabolic profile that was performed 1 week ago.      As always, the patient has been told to contact us if he has any questions or any concerns.  Also, I will have the patient follow up in 3 months' time to ensure that he is still doing well with the lower doses of our medications.      cc:   MD ROSALVA Henderson MD, FACC

## 2021-02-25 ENCOUNTER — ANCILLARY PROCEDURE (OUTPATIENT)
Dept: CARDIOLOGY | Facility: CLINIC | Age: 86
End: 2021-02-25
Attending: INTERNAL MEDICINE
Payer: MEDICARE

## 2021-02-25 DIAGNOSIS — Z95.0 PACEMAKER: ICD-10-CM

## 2021-02-25 DIAGNOSIS — I49.5 SICK SINUS SYNDROME (H): ICD-10-CM

## 2021-02-25 PROCEDURE — 93294 REM INTERROG EVL PM/LDLS PM: CPT | Performed by: INTERNAL MEDICINE

## 2021-02-25 PROCEDURE — 93296 REM INTERROG EVL PM/IDS: CPT | Performed by: INTERNAL MEDICINE

## 2021-03-01 LAB
MDC_IDC_LEAD_IMPLANT_DT: NORMAL
MDC_IDC_LEAD_IMPLANT_DT: NORMAL
MDC_IDC_LEAD_LOCATION: NORMAL
MDC_IDC_LEAD_LOCATION: NORMAL
MDC_IDC_LEAD_MFG: NORMAL
MDC_IDC_LEAD_MFG: NORMAL
MDC_IDC_LEAD_MODEL: NORMAL
MDC_IDC_LEAD_MODEL: NORMAL
MDC_IDC_LEAD_POLARITY_TYPE: NORMAL
MDC_IDC_LEAD_POLARITY_TYPE: NORMAL
MDC_IDC_LEAD_SERIAL: NORMAL
MDC_IDC_LEAD_SERIAL: NORMAL
MDC_IDC_MSMT_BATTERY_DTM: NORMAL
MDC_IDC_MSMT_BATTERY_IMPEDANCE: 237 OHM
MDC_IDC_MSMT_BATTERY_REMAINING_LONGEVITY: 125 MO
MDC_IDC_MSMT_BATTERY_STATUS: NORMAL
MDC_IDC_MSMT_BATTERY_VOLTAGE: 2.78 V
MDC_IDC_MSMT_LEADCHNL_RA_IMPEDANCE_VALUE: 402 OHM
MDC_IDC_MSMT_LEADCHNL_RA_PACING_THRESHOLD_AMPLITUDE: 0.5 V
MDC_IDC_MSMT_LEADCHNL_RA_PACING_THRESHOLD_PULSEWIDTH: 0.4 MS
MDC_IDC_MSMT_LEADCHNL_RV_IMPEDANCE_VALUE: 396 OHM
MDC_IDC_MSMT_LEADCHNL_RV_PACING_THRESHOLD_AMPLITUDE: 1.12 V
MDC_IDC_MSMT_LEADCHNL_RV_PACING_THRESHOLD_PULSEWIDTH: 0.4 MS
MDC_IDC_PG_IMPLANT_DTM: NORMAL
MDC_IDC_PG_MFG: NORMAL
MDC_IDC_PG_MODEL: NORMAL
MDC_IDC_PG_SERIAL: NORMAL
MDC_IDC_PG_TYPE: NORMAL
MDC_IDC_SESS_CLINIC_NAME: NORMAL
MDC_IDC_SESS_DTM: NORMAL
MDC_IDC_SESS_TYPE: NORMAL
MDC_IDC_SET_BRADY_AT_MODE_SWITCH_MODE: NORMAL
MDC_IDC_SET_BRADY_AT_MODE_SWITCH_RATE: 175 {BEATS}/MIN
MDC_IDC_SET_BRADY_LOWRATE: 60 {BEATS}/MIN
MDC_IDC_SET_BRADY_MAX_SENSOR_RATE: 130 {BEATS}/MIN
MDC_IDC_SET_BRADY_MAX_TRACKING_RATE: 130 {BEATS}/MIN
MDC_IDC_SET_BRADY_MODE: NORMAL
MDC_IDC_SET_BRADY_PAV_DELAY_LOW: 150 MS
MDC_IDC_SET_BRADY_SAV_DELAY_LOW: 120 MS
MDC_IDC_SET_LEADCHNL_RA_PACING_AMPLITUDE: 1.5 V
MDC_IDC_SET_LEADCHNL_RA_PACING_CAPTURE_MODE: NORMAL
MDC_IDC_SET_LEADCHNL_RA_PACING_POLARITY: NORMAL
MDC_IDC_SET_LEADCHNL_RA_PACING_PULSEWIDTH: 0.4 MS
MDC_IDC_SET_LEADCHNL_RA_SENSING_POLARITY: NORMAL
MDC_IDC_SET_LEADCHNL_RA_SENSING_SENSITIVITY: 0.5 MV
MDC_IDC_SET_LEADCHNL_RV_PACING_AMPLITUDE: 2.25 V
MDC_IDC_SET_LEADCHNL_RV_PACING_CAPTURE_MODE: NORMAL
MDC_IDC_SET_LEADCHNL_RV_PACING_POLARITY: NORMAL
MDC_IDC_SET_LEADCHNL_RV_PACING_PULSEWIDTH: 0.4 MS
MDC_IDC_SET_LEADCHNL_RV_SENSING_POLARITY: NORMAL
MDC_IDC_SET_LEADCHNL_RV_SENSING_SENSITIVITY: 5.6 MV
MDC_IDC_SET_ZONE_DETECTION_INTERVAL: 333.33 MS
MDC_IDC_SET_ZONE_DETECTION_INTERVAL: 342.86 MS
MDC_IDC_SET_ZONE_TYPE: NORMAL
MDC_IDC_SET_ZONE_TYPE: NORMAL
MDC_IDC_STAT_AT_BURDEN_PERCENT: 0 %
MDC_IDC_STAT_AT_DTM_END: NORMAL
MDC_IDC_STAT_AT_DTM_START: NORMAL
MDC_IDC_STAT_AT_MODE_SW_COUNT: 5
MDC_IDC_STAT_BRADY_AP_VP_PERCENT: 0 %
MDC_IDC_STAT_BRADY_AP_VS_PERCENT: 97 %
MDC_IDC_STAT_BRADY_AS_VP_PERCENT: 0 %
MDC_IDC_STAT_BRADY_AS_VS_PERCENT: 3 %
MDC_IDC_STAT_BRADY_DTM_END: NORMAL
MDC_IDC_STAT_BRADY_DTM_START: NORMAL
MDC_IDC_STAT_EPISODE_RECENT_COUNT: 0
MDC_IDC_STAT_EPISODE_RECENT_COUNT: 1
MDC_IDC_STAT_EPISODE_RECENT_COUNT_DTM_END: NORMAL
MDC_IDC_STAT_EPISODE_RECENT_COUNT_DTM_END: NORMAL
MDC_IDC_STAT_EPISODE_RECENT_COUNT_DTM_START: NORMAL
MDC_IDC_STAT_EPISODE_RECENT_COUNT_DTM_START: NORMAL
MDC_IDC_STAT_EPISODE_TYPE: NORMAL
MDC_IDC_STAT_EPISODE_TYPE: NORMAL

## 2021-06-10 ENCOUNTER — TELEPHONE (OUTPATIENT)
Dept: CARDIOLOGY | Facility: CLINIC | Age: 86
End: 2021-06-10

## 2021-06-10 NOTE — TELEPHONE ENCOUNTER
----- Message from Barbara Bhatia RN sent at 6/8/2021  9:12 AM CDT -----  Regarding: Refill discrepency  Refill request received from [facility] asking for refill on Metoprolol succinate 50mg tablets, 1 tablet BID.     Last visit with [facility] says Metoprolol 25mg tablet, 1 tablet BID.     Please review and refill.

## 2021-06-10 NOTE — TELEPHONE ENCOUNTER
Attempted to call pt. Spoke with pt's wife. Wife states she just got the prescription refilled for 25 mg twice daily which is what he has been taking. Wife advised that is the correct dose & to continue. Herlinda TORRES

## 2021-06-14 ENCOUNTER — TELEPHONE (OUTPATIENT)
Dept: CARDIOLOGY | Facility: CLINIC | Age: 86
End: 2021-06-14

## 2021-06-14 ENCOUNTER — ANCILLARY PROCEDURE (OUTPATIENT)
Dept: CARDIOLOGY | Facility: CLINIC | Age: 86
End: 2021-06-14
Attending: INTERNAL MEDICINE
Payer: MEDICARE

## 2021-06-14 DIAGNOSIS — Z95.0 CARDIAC PACEMAKER IN SITU: ICD-10-CM

## 2021-06-14 DIAGNOSIS — I47.29 NSVT (NONSUSTAINED VENTRICULAR TACHYCARDIA) (H): Primary | ICD-10-CM

## 2021-06-14 DIAGNOSIS — I42.9 CARDIOMYOPATHY, UNSPECIFIED TYPE (H): ICD-10-CM

## 2021-06-14 DIAGNOSIS — R06.02 SHORTNESS OF BREATH: ICD-10-CM

## 2021-06-14 PROCEDURE — 93296 REM INTERROG EVL PM/IDS: CPT | Performed by: INTERNAL MEDICINE

## 2021-06-14 PROCEDURE — 93294 REM INTERROG EVL PM/LDLS PM: CPT | Performed by: INTERNAL MEDICINE

## 2021-06-14 NOTE — TELEPHONE ENCOUNTER
Dr. Ya,    DAMION: Your patient had an episode suggestive of NSVT on today's device check. Episode lasted 8 beats, [id number]. Episode occurred 3/25/21 at 210pm, no associated symptoms. This is the first documented episode since generator change on 2/10/2016. Patient taking Toprol. EF 55-60% (2020). Any changes?        Medtronic Adapta (D) Remote PPM Device Check  AP: 97%  : <1%  Mode: AAIR <-> DDDR 60/130  Presenting Rhythm: AP/VS  Heart Rate: Adequate rates per histogram  Sensing: stable  Pacing Threshold: stable  Impedance: stable  Battery Status: 8-11.5 years  Atrial Arrhythmia: 6 mode switch episodes comprising <0.1% of the time. 1 EGM for review shows As>Vs for AFib lasting 1 hour 52 minutes, average ventricular rate 110bpm. Taking Eliquis.   Ventricular Arrhythmia: 1 ventricular high rate. Marker only EGM shows Vs>As suggestive of NSVT lasting 8 beats, rates 190-230bpm. Episode occurred on 3/25/21 at 210pm. No associated symptoms. EF 55-60% (2020). Will notify Dr. Jenny Styles with findings.     Care Plan: f/u [facility] q 3 months. Gave results over the phone. RONALD Langford

## 2021-06-21 NOTE — TELEPHONE ENCOUNTER
Attempted to call pt with recommendations from Dr. Ya, spoke with pt's wife. Order in chart & pt transferred to scheduling to arrange. Herlinda TORRES

## 2021-06-24 LAB
MDC_IDC_LEAD_IMPLANT_DT: NORMAL
MDC_IDC_LEAD_IMPLANT_DT: NORMAL
MDC_IDC_LEAD_LOCATION: NORMAL
MDC_IDC_LEAD_LOCATION: NORMAL
MDC_IDC_LEAD_MFG: NORMAL
MDC_IDC_LEAD_MFG: NORMAL
MDC_IDC_LEAD_MODEL: NORMAL
MDC_IDC_LEAD_MODEL: NORMAL
MDC_IDC_LEAD_POLARITY_TYPE: NORMAL
MDC_IDC_LEAD_POLARITY_TYPE: NORMAL
MDC_IDC_LEAD_SERIAL: NORMAL
MDC_IDC_LEAD_SERIAL: NORMAL
MDC_IDC_MSMT_BATTERY_DTM: NORMAL
MDC_IDC_MSMT_BATTERY_IMPEDANCE: 286 OHM
MDC_IDC_MSMT_BATTERY_REMAINING_LONGEVITY: 118 MO
MDC_IDC_MSMT_BATTERY_STATUS: NORMAL
MDC_IDC_MSMT_BATTERY_VOLTAGE: 2.79 V
MDC_IDC_MSMT_LEADCHNL_RA_IMPEDANCE_VALUE: 391 OHM
MDC_IDC_MSMT_LEADCHNL_RA_PACING_THRESHOLD_AMPLITUDE: 0.62 V
MDC_IDC_MSMT_LEADCHNL_RA_PACING_THRESHOLD_PULSEWIDTH: 0.4 MS
MDC_IDC_MSMT_LEADCHNL_RV_IMPEDANCE_VALUE: 380 OHM
MDC_IDC_MSMT_LEADCHNL_RV_PACING_THRESHOLD_AMPLITUDE: 1 V
MDC_IDC_MSMT_LEADCHNL_RV_PACING_THRESHOLD_PULSEWIDTH: 0.4 MS
MDC_IDC_PG_IMPLANT_DTM: NORMAL
MDC_IDC_PG_MFG: NORMAL
MDC_IDC_PG_MODEL: NORMAL
MDC_IDC_PG_SERIAL: NORMAL
MDC_IDC_PG_TYPE: NORMAL
MDC_IDC_SESS_CLINIC_NAME: NORMAL
MDC_IDC_SESS_DTM: NORMAL
MDC_IDC_SESS_TYPE: NORMAL
MDC_IDC_SET_BRADY_AT_MODE_SWITCH_MODE: NORMAL
MDC_IDC_SET_BRADY_AT_MODE_SWITCH_RATE: 175 {BEATS}/MIN
MDC_IDC_SET_BRADY_LOWRATE: 60 {BEATS}/MIN
MDC_IDC_SET_BRADY_MAX_SENSOR_RATE: 130 {BEATS}/MIN
MDC_IDC_SET_BRADY_MAX_TRACKING_RATE: 130 {BEATS}/MIN
MDC_IDC_SET_BRADY_MODE: NORMAL
MDC_IDC_SET_BRADY_PAV_DELAY_LOW: 150 MS
MDC_IDC_SET_BRADY_SAV_DELAY_LOW: 120 MS
MDC_IDC_SET_LEADCHNL_RA_PACING_AMPLITUDE: 1.5 V
MDC_IDC_SET_LEADCHNL_RA_PACING_CAPTURE_MODE: NORMAL
MDC_IDC_SET_LEADCHNL_RA_PACING_POLARITY: NORMAL
MDC_IDC_SET_LEADCHNL_RA_PACING_PULSEWIDTH: 0.4 MS
MDC_IDC_SET_LEADCHNL_RA_SENSING_POLARITY: NORMAL
MDC_IDC_SET_LEADCHNL_RA_SENSING_SENSITIVITY: 0.5 MV
MDC_IDC_SET_LEADCHNL_RV_PACING_AMPLITUDE: 2 V
MDC_IDC_SET_LEADCHNL_RV_PACING_CAPTURE_MODE: NORMAL
MDC_IDC_SET_LEADCHNL_RV_PACING_POLARITY: NORMAL
MDC_IDC_SET_LEADCHNL_RV_PACING_PULSEWIDTH: 0.4 MS
MDC_IDC_SET_LEADCHNL_RV_SENSING_POLARITY: NORMAL
MDC_IDC_SET_LEADCHNL_RV_SENSING_SENSITIVITY: 5.6 MV
MDC_IDC_SET_ZONE_DETECTION_INTERVAL: 333.33 MS
MDC_IDC_SET_ZONE_DETECTION_INTERVAL: 342.86 MS
MDC_IDC_SET_ZONE_TYPE: NORMAL
MDC_IDC_SET_ZONE_TYPE: NORMAL
MDC_IDC_STAT_AT_BURDEN_PERCENT: 0 %
MDC_IDC_STAT_AT_DTM_END: NORMAL
MDC_IDC_STAT_AT_DTM_START: NORMAL
MDC_IDC_STAT_AT_MODE_SW_COUNT: 10
MDC_IDC_STAT_BRADY_AP_VP_PERCENT: 0 %
MDC_IDC_STAT_BRADY_AP_VS_PERCENT: 98 %
MDC_IDC_STAT_BRADY_AS_VP_PERCENT: 0 %
MDC_IDC_STAT_BRADY_AS_VS_PERCENT: 2 %
MDC_IDC_STAT_BRADY_DTM_END: NORMAL
MDC_IDC_STAT_BRADY_DTM_START: NORMAL
MDC_IDC_STAT_EPISODE_RECENT_COUNT: 1
MDC_IDC_STAT_EPISODE_RECENT_COUNT: 2
MDC_IDC_STAT_EPISODE_RECENT_COUNT_DTM_END: NORMAL
MDC_IDC_STAT_EPISODE_RECENT_COUNT_DTM_END: NORMAL
MDC_IDC_STAT_EPISODE_RECENT_COUNT_DTM_START: NORMAL
MDC_IDC_STAT_EPISODE_RECENT_COUNT_DTM_START: NORMAL
MDC_IDC_STAT_EPISODE_TYPE: NORMAL
MDC_IDC_STAT_EPISODE_TYPE: NORMAL

## 2021-07-19 ENCOUNTER — HOSPITAL ENCOUNTER (OUTPATIENT)
Dept: CARDIOLOGY | Facility: CLINIC | Age: 86
Discharge: HOME | End: 2021-07-19
Attending: INTERNAL MEDICINE | Admitting: INTERNAL MEDICINE
Payer: MEDICARE

## 2021-07-19 ENCOUNTER — HOSPITAL ENCOUNTER (OUTPATIENT)
Dept: NUCLEAR MEDICINE | Facility: CLINIC | Age: 86
Setting detail: NUCLEAR MEDICINE
End: 2021-07-19
Attending: INTERNAL MEDICINE
Payer: MEDICARE

## 2021-07-19 ENCOUNTER — HOSPITAL ENCOUNTER (OUTPATIENT)
Dept: NUCLEAR MEDICINE | Facility: CLINIC | Age: 86
Setting detail: NUCLEAR MEDICINE
Discharge: HOME | End: 2021-07-19
Attending: INTERNAL MEDICINE | Admitting: INTERNAL MEDICINE
Payer: MEDICARE

## 2021-07-19 DIAGNOSIS — I47.29 NSVT (NONSUSTAINED VENTRICULAR TACHYCARDIA) (H): ICD-10-CM

## 2021-07-19 DIAGNOSIS — I42.9 CARDIOMYOPATHY, UNSPECIFIED TYPE (H): ICD-10-CM

## 2021-07-19 DIAGNOSIS — R06.02 SHORTNESS OF BREATH: ICD-10-CM

## 2021-07-19 LAB
STRESS ECHO BASELINE DIASTOLIC HE: 88
STRESS ECHO BASELINE HR: 71
STRESS ECHO BASELINE SYSTOLIC BP: 174
STRESS ECHO TARGET HR: 130
STRESS/REST PERFUSION RATIO: 1.03

## 2021-07-19 PROCEDURE — 93018 CV STRESS TEST I&R ONLY: CPT | Mod: MC | Performed by: INTERNAL MEDICINE

## 2021-07-19 PROCEDURE — A9502 TC99M TETROFOSMIN: HCPCS | Performed by: INTERNAL MEDICINE

## 2021-07-19 PROCEDURE — 343N000001 HC RX 343: Performed by: INTERNAL MEDICINE

## 2021-07-19 PROCEDURE — 78452 HT MUSCLE IMAGE SPECT MULT: CPT | Mod: MC

## 2021-07-19 PROCEDURE — 250N000011 HC RX IP 250 OP 636

## 2021-07-19 PROCEDURE — 93016 CV STRESS TEST SUPVJ ONLY: CPT | Performed by: INTERNAL MEDICINE

## 2021-07-19 PROCEDURE — 78452 HT MUSCLE IMAGE SPECT MULT: CPT | Mod: 26 | Performed by: INTERNAL MEDICINE

## 2021-07-19 PROCEDURE — 93017 CV STRESS TEST TRACING ONLY: CPT

## 2021-07-19 RX ORDER — REGADENOSON 0.08 MG/ML
0.4 INJECTION, SOLUTION INTRAVENOUS ONCE
Status: COMPLETED | OUTPATIENT
Start: 2021-07-19 | End: 2021-07-19

## 2021-07-19 RX ORDER — ALBUTEROL SULFATE 90 UG/1
2 AEROSOL, METERED RESPIRATORY (INHALATION) EVERY 5 MIN PRN
Status: DISCONTINUED | OUTPATIENT
Start: 2021-07-19 | End: 2021-07-20 | Stop reason: HOSPADM

## 2021-07-19 RX ORDER — ACYCLOVIR 200 MG/1
0-1 CAPSULE ORAL
Status: DISCONTINUED | OUTPATIENT
Start: 2021-07-19 | End: 2021-07-20 | Stop reason: HOSPADM

## 2021-07-19 RX ORDER — AMINOPHYLLINE 25 MG/ML
50-100 INJECTION, SOLUTION INTRAVENOUS
Status: DISCONTINUED | OUTPATIENT
Start: 2021-07-19 | End: 2021-07-20 | Stop reason: HOSPADM

## 2021-07-19 RX ORDER — CAFFEINE CITRATE 20 MG/ML
60 SOLUTION INTRAVENOUS
Status: DISCONTINUED | OUTPATIENT
Start: 2021-07-19 | End: 2021-07-20 | Stop reason: HOSPADM

## 2021-07-19 RX ORDER — REGADENOSON 0.08 MG/ML
INJECTION, SOLUTION INTRAVENOUS
Status: COMPLETED
Start: 2021-07-19 | End: 2021-07-19

## 2021-07-19 RX ADMIN — REGADENOSON 0.4 MG: 0.08 INJECTION, SOLUTION INTRAVENOUS at 12:15

## 2021-07-19 RX ADMIN — TETROFOSMIN 31 MCI.: 1.38 INJECTION, POWDER, LYOPHILIZED, FOR SOLUTION INTRAVENOUS at 12:23

## 2021-07-19 RX ADMIN — TETROFOSMIN 10.5 MCI.: 1.38 INJECTION, POWDER, LYOPHILIZED, FOR SOLUTION INTRAVENOUS at 11:01

## 2021-07-30 ENCOUNTER — OFFICE VISIT (OUTPATIENT)
Dept: CARDIOLOGY | Facility: CLINIC | Age: 86
End: 2021-07-30
Payer: MEDICARE

## 2021-07-30 VITALS
WEIGHT: 178.2 LBS | OXYGEN SATURATION: 97 % | HEIGHT: 68 IN | HEART RATE: 82 BPM | SYSTOLIC BLOOD PRESSURE: 132 MMHG | DIASTOLIC BLOOD PRESSURE: 72 MMHG | BODY MASS INDEX: 27.01 KG/M2

## 2021-07-30 DIAGNOSIS — E78.5 HYPERLIPIDEMIA LDL GOAL <70: ICD-10-CM

## 2021-07-30 DIAGNOSIS — I47.29 NSVT (NONSUSTAINED VENTRICULAR TACHYCARDIA) (H): Primary | ICD-10-CM

## 2021-07-30 DIAGNOSIS — I10 ESSENTIAL HYPERTENSION, BENIGN: ICD-10-CM

## 2021-07-30 DIAGNOSIS — I48.0 PAROXYSMAL ATRIAL FIBRILLATION (H): ICD-10-CM

## 2021-07-30 PROCEDURE — 99214 OFFICE O/P EST MOD 30 MIN: CPT | Performed by: PHYSICIAN ASSISTANT

## 2021-07-30 ASSESSMENT — MIFFLIN-ST. JEOR: SCORE: 1442.81

## 2021-07-30 NOTE — LETTER
2021    Mikey Patton MD  [address] [facility] [address]    RE: Jose Rucker       Dear Colleague,    I had the pleasure of seeing Jose Rucker in the [facility].        CARDIOLOGY CLINIC PROGRESS NOTE    DOS: 2021      Jose Rucker  : 1931, 89 year old  MRN: [id number]      History:  I am meeting Jose Rucker today.  He is a patient of Dr. Ya.  He was accompanied by his wife, Shahla.  They have been  51 year this coming September.     Jose Rucker is a pleasant 89 year old man with a history of sick sinus syndrome, paroxysmal atrial fibrillation and cardiomyopathy.  He also has a history of hyperlipidemia.  This patient has a permanent pacemaker in situ for treatment of sick sinus syndrome.  He has a mild to moderate dilatation of the aortic root.      Interval History:   20 last seen by Dr. Ya.  He had been having episodes of weakness and hypotension.  His Toprol XL had been reduced to 25 mg and lisinopril reduced to 5 mg.  Dr. Ya reduced lisinopril further to 2.5 mg.     21 Device check, RN noted run of NSVT.  Dr. Ya recommended the patient undergo a Lexiscan.     21 Lexiscan: Very mild, reversible defect at the distal anterior wall, suspect this is attenuation, less likely would be mild ischemia.         He presents today for overdue 3 month follow up and to review Le.   He has no chest pain, no chest pressure.   No bleeding issues.   No SOB, orthopnea, edema. No palpitations.   No near syncope or syncope.   Weakness is better.   Had it one night.   Here BP initially 150/90.  On recheck 132/72.  BP running 120s at home.       ROS:  Skin:  Negative     Eyes:  Positive for glasses  ENT:  Positive for hearing loss  Respiratory:  Negative    Cardiovascular:  Negative    Gastroenterology: Negative    Genitourinary:  Negative    Musculoskeletal:   Negative    Neurologic:  Negative    Psychiatric:  Negative    Heme/Lymph/Imm:  Negative    Endocrine:  Negative      PAST MEDICAL HISTORY:  Past Medical History:   Diagnosis Date     A-fib (H)      Bradycardia     PPM 5/27/2008     Cardiac pacemaker in situ 9/17/2014 2008 PPM Medtronic Sensia, model# SEDR01, serial# [id number]       Hearing loss      Heart murmur 5/23/2007    Overview:  Benign Echo     Hernia, abdominal      HTN (hypertension)      Hyperlipidemia with target LDL less than 130 5/23/2007    Overview:  ICD 10     Irregular heart beat     AFIB     Osteoarthritis of pelvic region 11/12/2008    Overview:  LW Modifier:  Rt, SHARON done 1/15/09 LW Onset:  5-6 years ; DJD Hip     Pacemaker      Palpitations      Primary cardiomyopathy (H) 11/4/2014     Problem list name updated by automated process. Provider to review     Prostate cancer (H) 6/26/2007    Overview:  [place] Score:5+5=1 Radiation Therapy x 25 + Radioactive Seed Implant     Sick sinus syndrome (H)      Syncope        PAST SURGICAL HISTORY:  Past Surgical History:   Procedure Laterality Date     APPLY WOUND VAC Left 7/16/2020    Procedure: WOUND VAC PLACEMENT;  Surgeon: Phil Orellana MD;  Location: [facility] OR     CYSTOSCOPY       EP PPM GENERATOR CHANGE DUAL  2/10/16     HERNIA REPAIR       IMPLANT PACEMAKER  5/7/08    Medtronic Sensia, model# SEDR01, serial# [id number]     IRRIGATION AND DEBRIDEMENT LOWER EXTREMITY, COMBINED Left 7/16/2020    Procedure: IRRIGATION AND DEBRIDEMENT LEFT LEG WOUND;  Surgeon: Phil Orellana MD;  Location: [facility] OR     PROSTATE SURGERY         SOCIAL HISTORY:  Social History     Socioeconomic History     Marital status:      Spouse name: None     Number of children: None     Years of education: None     Highest education level: None   Occupational History     None   Tobacco Use     Smoking status: Former Smoker     Packs/day: 0.50     Years: 6.00     Pack years: 3.00     Types: Cigarettes     Quit date: 1956      Years since quittin.6     Smokeless tobacco: Never Used   Substance and Sexual Activity     Alcohol use: Yes     Comment: 1 beer daily     Drug use: Never     Sexual activity: None   Other Topics Concern      Service Not Asked     Blood Transfusions Not Asked     Caffeine Concern No     Comment: 2 cups daily     Occupational Exposure Not Asked     Hobby Hazards Not Asked     Sleep Concern Not Asked     Stress Concern Not Asked     Weight Concern Not Asked     Special Diet No     Back Care Not Asked     Exercise No     Comment: some stairs, yardwork, uses exercise ball     Bike Helmet Not Asked     Seat Belt Not Asked     Self-Exams Not Asked     Parent/sibling w/ CABG, MI or angioplasty before 65F 55M? Not Asked   Social History Narrative     None     Social Determinants of Health     Financial Resource Strain:      Difficulty of Paying Living Expenses:    Food Insecurity:      Worried About Running Out of Food in the Last Year:      Ran Out of Food in the Last Year:    Transportation Needs:      Lack of Transportation (Medical):      Lack of Transportation (Non-Medical):    Physical Activity:      Days of Exercise per Week:      Minutes of Exercise per Session:    Stress:      Feeling of Stress :    Social Connections:      Frequency of Communication with Friends and Family:      Frequency of Social Gatherings with Friends and Family:      Attends Worship Services:      Active Member of Clubs or Organizations:      Attends Club or Organization Meetings:      Marital Status:    Intimate Partner Violence:      Fear of Current or Ex-Partner:      Emotionally Abused:      Physically Abused:      Sexually Abused:        FAMILY HISTORY:  Family History   Problem Relation Age of Onset     Heart Disease Mother        MEDS: apixaban ANTICOAGULANT (ELIQUIS) 5 MG tablet, Take 1 tablet (5 mg) by mouth 2 times daily  Diphenhydramine-APAP, sleep, (TYLENOL PM EXTRA STRENGTH PO), Take 500 mg by mouth  "daily  furosemide (LASIX) 40 MG tablet, Take 1 tablet (40 mg) by mouth daily  lisinopril (ZESTRIL) 2.5 MG tablet, Take 1 tablet (2.5 mg) by mouth daily  metoprolol succinate ER (TOPROL-XL) 25 MG 24 hr tablet, Take 1 tablet (25 mg) by mouth 2 times daily  simvastatin (ZOCOR) 20 MG tablet, Take 1 tablet (20 mg) by mouth At Bedtime  Multiple Minerals-Vitamins (PROSTEON PO), Take 500 mg by mouth 2 tablets twice daily (Patient not taking: Reported on 7/30/2021)    No current facility-administered medications on file prior to visit.      ALLERGIES:   Allergies   Allergen Reactions     Penicillin G Rash     And patient had an awful smell        PHYSICAL EXAM:  Vitals: /72 (BP Location: Right arm, Patient Position: Sitting, Cuff Size: Adult Regular)   Pulse 82   Ht 1.727 m (5' 8\")   Wt 80.8 kg (178 lb 3.2 oz)   SpO2 97%   BMI 27.10 kg/m    Constitutional:  cooperative, alert and oriented, well developed, well nourished, in no acute distress;cooperative overweight;frail      Skin:  warm and dry to the touch, no apparent skin lesions or masses noted        Head:  normocephalic, no masses or lesions        Eyes:  pupils equal and round;conjunctivae and lids unremarkable;sclera white   + arcus    ENT:  no pallor or cyanosis hearing aide(s) present      Neck:  JVP normal;no carotid bruit        Respiratory:  normal breath sounds, clear to auscultation, normal A-P diameter, normal symmetry, normal respiratory excursion, no use of accessory muscles  (Occassional coarse crackles at right base.)      Cardiac: regular rhythm, normal S1/S2, no S3 or S4, apical impulse not displaced, no murmurs, gallops or rubs                  GI:  abdomen soft;BS normoactive        Vascular:                                        Extremities and Musculoskeletal:  no deformities, clubbing, cyanosis, erythema observed;no edema stasis pigmentation      Neurological:  no gross motor deficits;affect appropriate            LABS/DATA:  I reviewed " the following:  Device check 6/14/21:  Medtronic Adapta (D) Remote PPM Device Check  AP: 97%  : <1%  Mode: AAIR <-> DDDR 60/130  Presenting Rhythm: AP/VS  Heart Rate: Adequate rates per histogram  Sensing: stable  Pacing Threshold: stable  Impedance: stable  Battery Status: 8-11.5 years  Atrial Arrhythmia: 6 mode switch episodes comprising <0.1% of the time. 1 EGM for review shows As>Vs for AFib lasting 1 hour 52 minutes, average ventricular rate 110bpm. Taking Eliquis.   Ventricular Arrhythmia: 1 ventricular high rate. Marker only EGM shows Vs>As suggestive of NSVT lasting 8 beats, rates 190-230bpm. Episode occurred on 3/25/21 at 210pm. No associated symptoms. EF 55-60% (2020). Will notify Dr. Jenny Styles with findings.      Care Plan: f/u [facility] q 3 months. Gave results over the phone. RONALD Langford        Le 7/19/21:     The nuclear stress test is probably negative for inducible myocardial ischemia or infarction.     Nuclear Study Quality: Very mild, reversible defect at the distal anterior wall, suspect this is attenuation, less likely would be mild ischemia.     The left ventricular ejection fraction at stress is greater than 70%.     Nuclear stress in 2014 was normal.      ASSESSMENT/PLAN:  1.  Atrial fibrillation.  The patient is asymptomatic with atrial fibrillation, anticoagulated with apixaban without bleeding issues.  The majority of the time, the patient is in sinus rhythm.     2.  Cardiomyopathy.  The patient's most recent ejection fraction is normal.  No CHF.  Continue with Toprol XL and lisinopril.  Continue Lasix 40 mg.  Echo in Nov 2021.     3.  Episodes of weakness, which appear to be due to hypotension.  Symptoms improved with reducing the doses of the Toprol XL and lisinopril.       4.  Mild to moderate dilatation of the aortic root at 4.4 cm, which is unchanged from previous, and borderline dilatation of the ascending aorta on echocardiography.  Repeat echo 11/2021.     5.  Basal  septal thickening consistent with sigmoid septum, which we see in elderly patients.  This probably does not represent hypertrophic cardiomyopathy and there is no evidence of obstruction.     6.  Normally functioning permanent pacemaker.  Device checks via Device clinic.     7.  NSVT.  No chest pain, no palpitations, no syncope.  Le 7/2021 was unremarkable.       8.  Dyslipidemia.  Last FLP 11/23/20: .  Repeat lipids in Nov 2021.  Continue simvastatin 20 mg.              Follow up:  Dr. Ya in late November 2021 with echo, EKG, BMP, FLP/ALT        Jennifer Mayen PA-C      Thank you for allowing me to participate in the care of your patient.      Sincerely,     Jennifer Mayen PA-C     [facility]  cc:   No referring provider defined for this encounter.

## 2021-07-30 NOTE — PROGRESS NOTES
CARDIOLOGY CLINIC PROGRESS NOTE    DOS: 2021      Jose Rucker  : 1931, 89 year old  MRN: [id number]      History:  I am meeting Jose Rucker today.  He is a patient of Dr. Ya.  He was accompanied by his wife, Shahla.  They have been  51 year this coming September.     Jose Rucker is a pleasant 89 year old man with a history of sick sinus syndrome, paroxysmal atrial fibrillation and cardiomyopathy.  He also has a history of hyperlipidemia.  This patient has a permanent pacemaker in situ for treatment of sick sinus syndrome.  He has a mild to moderate dilatation of the aortic root.      Interval History:   20 last seen by Dr. aY.  He had been having episodes of weakness and hypotension.  His Toprol XL had been reduced to 25 mg and lisinopril reduced to 5 mg.  Dr. Ya reduced lisinopril further to 2.5 mg.     21 Device check, RN noted run of NSVT.  Dr. Ya recommended the patient undergo a Lexiscan.     21 Lexiscan: Very mild, reversible defect at the distal anterior wall, suspect this is attenuation, less likely would be mild ischemia.         He presents today for overdue 3 month follow up and to review Le.   He has no chest pain, no chest pressure.   No bleeding issues.   No SOB, orthopnea, edema. No palpitations.   No near syncope or syncope.   Weakness is better.   Had it one night.   Here BP initially 150/90.  On recheck 132/72.  BP running 120s at home.       ROS:  Skin:  Negative     Eyes:  Positive for glasses  ENT:  Positive for hearing loss  Respiratory:  Negative    Cardiovascular:  Negative    Gastroenterology: Negative    Genitourinary:  Negative    Musculoskeletal:  Negative    Neurologic:  Negative    Psychiatric:  Negative    Heme/Lymph/Imm:  Negative    Endocrine:  Negative      PAST MEDICAL HISTORY:  Past Medical History:   Diagnosis Date     A-fib (H)      Bradycardia     PPM 2008     Cardiac  pacemaker in situ 2014 PPM Medtronic Sensia, model# SEDR01, serial# [id number]       Hearing loss      Heart murmur 2007    Overview:  Benign Echo     Hernia, abdominal      HTN (hypertension)      Hyperlipidemia with target LDL less than 130 2007    Overview:  ICD 10     Irregular heart beat     AFIB     Osteoarthritis of pelvic region 2008    Overview:  LW Modifier:  Rt, SHARON done 1/15/09 LW Onset:  5-6 years ; DJD Hip     Pacemaker      Palpitations      Primary cardiomyopathy (H) 2014     Problem list name updated by automated process. Provider to review     Prostate cancer (H) 2007    Overview:  [place] Score:5+5=1 Radiation Therapy x 25 + Radioactive Seed Implant     Sick sinus syndrome (H)      Syncope        PAST SURGICAL HISTORY:  Past Surgical History:   Procedure Laterality Date     APPLY WOUND VAC Left 2020    Procedure: WOUND VAC PLACEMENT;  Surgeon: Phil Orellana MD;  Location: [facility] OR     CYSTOSCOPY       EP PPM GENERATOR CHANGE DUAL  2/10/16     HERNIA REPAIR       IMPLANT PACEMAKER  08    Medtronic Sensia, model# SEDR01, serial# [id number]     IRRIGATION AND DEBRIDEMENT LOWER EXTREMITY, COMBINED Left 2020    Procedure: IRRIGATION AND DEBRIDEMENT LEFT LEG WOUND;  Surgeon: Phil Orellana MD;  Location: [facility] OR     PROSTATE SURGERY         SOCIAL HISTORY:  Social History     Socioeconomic History     Marital status:      Spouse name: None     Number of children: None     Years of education: None     Highest education level: None   Occupational History     None   Tobacco Use     Smoking status: Former Smoker     Packs/day: 0.50     Years: 6.00     Pack years: 3.00     Types: Cigarettes     Quit date: 6     Years since quittin.6     Smokeless tobacco: Never Used   Substance and Sexual Activity     Alcohol use: Yes     Comment: 1 beer daily     Drug use: Never     Sexual activity: None   Other Topics Concern      Service Not  Asked     Blood Transfusions Not Asked     Caffeine Concern No     Comment: 2 cups daily     Occupational Exposure Not Asked     Hobby Hazards Not Asked     Sleep Concern Not Asked     Stress Concern Not Asked     Weight Concern Not Asked     Special Diet No     Back Care Not Asked     Exercise No     Comment: some stairs, yardwork, uses exercise ball     Bike Helmet Not Asked     Seat Belt Not Asked     Self-Exams Not Asked     Parent/sibling w/ CABG, MI or angioplasty before 65F 55M? Not Asked   Social History Narrative     None     Social Determinants of Health     Financial Resource Strain:      Difficulty of Paying Living Expenses:    Food Insecurity:      Worried About Running Out of Food in the Last Year:      Ran Out of Food in the Last Year:    Transportation Needs:      Lack of Transportation (Medical):      Lack of Transportation (Non-Medical):    Physical Activity:      Days of Exercise per Week:      Minutes of Exercise per Session:    Stress:      Feeling of Stress :    Social Connections:      Frequency of Communication with Friends and Family:      Frequency of Social Gatherings with Friends and Family:      Attends Holiness Services:      Active Member of Clubs or Organizations:      Attends Club or Organization Meetings:      Marital Status:    Intimate Partner Violence:      Fear of Current or Ex-Partner:      Emotionally Abused:      Physically Abused:      Sexually Abused:        FAMILY HISTORY:  Family History   Problem Relation Age of Onset     Heart Disease Mother        MEDS: apixaban ANTICOAGULANT (ELIQUIS) 5 MG tablet, Take 1 tablet (5 mg) by mouth 2 times daily  Diphenhydramine-APAP, sleep, (TYLENOL PM EXTRA STRENGTH PO), Take 500 mg by mouth daily  furosemide (LASIX) 40 MG tablet, Take 1 tablet (40 mg) by mouth daily  lisinopril (ZESTRIL) 2.5 MG tablet, Take 1 tablet (2.5 mg) by mouth daily  metoprolol succinate ER (TOPROL-XL) 25 MG 24 hr tablet, Take 1 tablet (25 mg) by mouth 2 times  "daily  simvastatin (ZOCOR) 20 MG tablet, Take 1 tablet (20 mg) by mouth At Bedtime  Multiple Minerals-Vitamins (PROSTEON PO), Take 500 mg by mouth 2 tablets twice daily (Patient not taking: Reported on 7/30/2021)    No current facility-administered medications on file prior to visit.      ALLERGIES:   Allergies   Allergen Reactions     Penicillin G Rash     And patient had an awful smell        PHYSICAL EXAM:  Vitals: /72 (BP Location: Right arm, Patient Position: Sitting, Cuff Size: Adult Regular)   Pulse 82   Ht 1.727 m (5' 8\")   Wt 80.8 kg (178 lb 3.2 oz)   SpO2 97%   BMI 27.10 kg/m    Constitutional:  cooperative, alert and oriented, well developed, well nourished, in no acute distress;cooperative overweight;frail      Skin:  warm and dry to the touch, no apparent skin lesions or masses noted        Head:  normocephalic, no masses or lesions        Eyes:  pupils equal and round;conjunctivae and lids unremarkable;sclera white   + arcus    ENT:  no pallor or cyanosis hearing aide(s) present      Neck:  JVP normal;no carotid bruit        Respiratory:  normal breath sounds, clear to auscultation, normal A-P diameter, normal symmetry, normal respiratory excursion, no use of accessory muscles  (Occassional coarse crackles at right base.)      Cardiac: regular rhythm, normal S1/S2, no S3 or S4, apical impulse not displaced, no murmurs, gallops or rubs                  GI:  abdomen soft;BS normoactive        Vascular:                                        Extremities and Musculoskeletal:  no deformities, clubbing, cyanosis, erythema observed;no edema stasis pigmentation      Neurological:  no gross motor deficits;affect appropriate            LABS/DATA:  I reviewed the following:  Device check 6/14/21:  Medtronic Adapta (D) Remote PPM Device Check  AP: 97%  : <1%  Mode: AAIR <-> DDDR 60/130  Presenting Rhythm: AP/VS  Heart Rate: Adequate rates per histogram  Sensing: stable  Pacing Threshold: " stable  Impedance: stable  Battery Status: 8-11.5 years  Atrial Arrhythmia: 6 mode switch episodes comprising <0.1% of the time. 1 EGM for review shows As>Vs for AFib lasting 1 hour 52 minutes, average ventricular rate 110bpm. Taking Eliquis.   Ventricular Arrhythmia: 1 ventricular high rate. Marker only EGM shows Vs>As suggestive of NSVT lasting 8 beats, rates 190-230bpm. Episode occurred on 3/25/21 at 210pm. No associated symptoms. EF 55-60% (2020). Will notify Dr. Jenny Styles with findings.      Care Plan: f/u [facility] q 3 months. Gave results over the phone. RONALD Langford        Le 7/19/21:     The nuclear stress test is probably negative for inducible myocardial ischemia or infarction.     Nuclear Study Quality: Very mild, reversible defect at the distal anterior wall, suspect this is attenuation, less likely would be mild ischemia.     The left ventricular ejection fraction at stress is greater than 70%.     Nuclear stress in 2014 was normal.      ASSESSMENT/PLAN:  1.  Atrial fibrillation.  The patient is asymptomatic with atrial fibrillation, anticoagulated with apixaban without bleeding issues.  The majority of the time, the patient is in sinus rhythm.     2.  Cardiomyopathy.  The patient's most recent ejection fraction is normal.  No CHF.  Continue with Toprol XL and lisinopril.  Continue Lasix 40 mg.  Echo in Nov 2021.     3.  Episodes of weakness, which appear to be due to hypotension.  Symptoms improved with reducing the doses of the Toprol XL and lisinopril.       4.  Mild to moderate dilatation of the aortic root at 4.4 cm, which is unchanged from previous, and borderline dilatation of the ascending aorta on echocardiography.  Repeat echo 11/2021.     5.  Basal septal thickening consistent with sigmoid septum, which we see in elderly patients.  This probably does not represent hypertrophic cardiomyopathy and there is no evidence of obstruction.     6.  Normally functioning permanent pacemaker.   Device checks via Device clinic.     7.  NSVT.  No chest pain, no palpitations, no syncope.  Le 7/2021 was unremarkable.       8.  Dyslipidemia.  Last FLP 11/23/20: .  Repeat lipids in Nov 2021.  Continue simvastatin 20 mg.              Follow up:  Dr. Ya in late November 2021 with echo, EKG, BMP, FLP/ALT        Jennifer Mayen PA-C

## 2021-08-05 DIAGNOSIS — C61 PROSTATE CANCER (H): Primary | ICD-10-CM

## 2021-08-16 ENCOUNTER — LAB (OUTPATIENT)
Dept: LAB | Facility: CLINIC | Age: 86
End: 2021-08-16
Payer: MEDICARE

## 2021-08-16 DIAGNOSIS — C61 PROSTATE CANCER (H): ICD-10-CM

## 2021-08-16 PROCEDURE — 84153 ASSAY OF PSA TOTAL: CPT

## 2021-08-16 PROCEDURE — 36415 COLL VENOUS BLD VENIPUNCTURE: CPT

## 2021-08-17 LAB — PSA SERPL-MCNC: <0.01 UG/L (ref 0–4)

## 2021-08-18 ENCOUNTER — OFFICE VISIT (OUTPATIENT)
Dept: UROLOGY | Facility: CLINIC | Age: 86
End: 2021-08-18
Payer: MEDICARE

## 2021-08-18 VITALS
BODY MASS INDEX: 25.48 KG/M2 | SYSTOLIC BLOOD PRESSURE: 140 MMHG | DIASTOLIC BLOOD PRESSURE: 80 MMHG | HEIGHT: 70 IN | WEIGHT: 178 LBS

## 2021-08-18 DIAGNOSIS — R32 URINARY INCONTINENCE, UNSPECIFIED TYPE: ICD-10-CM

## 2021-08-18 DIAGNOSIS — C61 PROSTATE CANCER (H): Primary | ICD-10-CM

## 2021-08-18 LAB
ALBUMIN UR-MCNC: NEGATIVE MG/DL
APPEARANCE UR: CLEAR
BILIRUB UR QL STRIP: NEGATIVE
COLOR UR AUTO: YELLOW
GLUCOSE UR STRIP-MCNC: NEGATIVE MG/DL
HGB UR QL STRIP: NEGATIVE
KETONES UR STRIP-MCNC: NEGATIVE MG/DL
LEUKOCYTE ESTERASE UR QL STRIP: NEGATIVE
NITRATE UR QL: NEGATIVE
PH UR STRIP: 6.5 [PH] (ref 5–7)
SP GR UR STRIP: 1.02 (ref 1–1.03)
UROBILINOGEN UR STRIP-ACNC: 0.2 E.U./DL

## 2021-08-18 PROCEDURE — 51798 US URINE CAPACITY MEASURE: CPT | Performed by: PHYSICIAN ASSISTANT

## 2021-08-18 PROCEDURE — 81003 URINALYSIS AUTO W/O SCOPE: CPT | Mod: QW | Performed by: PHYSICIAN ASSISTANT

## 2021-08-18 PROCEDURE — 99213 OFFICE O/P EST LOW 20 MIN: CPT | Mod: 25 | Performed by: PHYSICIAN ASSISTANT

## 2021-08-18 ASSESSMENT — ENCOUNTER SYMPTOMS
FEVER: 0
HEMATURIA: 0
CHILLS: 0
DYSURIA: 0
VOMITING: 0
SHORTNESS OF BREATH: 0
NAUSEA: 0

## 2021-08-18 ASSESSMENT — PAIN SCALES - GENERAL: PAINLEVEL: NO PAIN (0)

## 2021-08-18 ASSESSMENT — MIFFLIN-ST. JEOR: SCORE: 1473.65

## 2021-08-18 NOTE — PROGRESS NOTES
Subjective      CHIEF COMPLAINT/REASON FOR VISIT   Patient of Dr. Huerta's seen on my calendar  Prostate cancer recheck       HISTORY OF PRESENT ILLNESS   Mr. Rucker is a very pleasant 89-year-old gentleman, who presents today for prostate cancer recheck.  Patient was diagnosed with Mekhi grade 10 prostate cancer in 2008.  He underwent S/P Combined hormonal therapy and radiotherapy in 2008.  His last hormone injection was in 2010.  Patient's PSA has remained undetectable at <0.01.  Patient was last seen 07/29/2019.    Patient denies any hematuria or dysuria.  He denies any significant concerns.  He wears Depends daily.  His wife notes that he has been having urinary incontinence over the last year.  She does not think that he realizes that he needs go the bathroom is slow to get there.  He will then have leakage into his undergarments.  Patient is not bothered by wearing Depends.      Much of history is obtained from the patient's wife due to him being hard of hearing and slightly confused.    The following portions of the patient's history were reviewed and updated as appropriate: allergies, current medications, past family history, past medical history, past social history, past surgical history, and problem list.     REVIEW OF SYSTEMS   Review of Systems   Constitutional: Negative for chills and fever.   HENT: Positive for hearing loss.    Respiratory: Negative for shortness of breath.    Cardiovascular: Negative for chest pain.   Gastrointestinal: Negative for nausea and vomiting.   Genitourinary: Negative for dysuria and hematuria.      Per HPI.     Patient Active Problem List   Diagnosis     A-fib (H)     HTN (hypertension)     Bradycardia     Syncope     Cardiac pacemaker in situ     Sick sinus syndrome (H)     Primary cardiomyopathy (H)     Carpal tunnel syndrome     Disorder of penis     Hearing loss     Heart murmur     Hip joint replacement status     Hyperlipidemia with target LDL less than 130      "Osteoarthritis of pelvic region     Palpitations     Prostate cancer (H)     Senile cataract     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)      Past Medical History:   Diagnosis Date     A-fib (H)      Bradycardia     PPM 5/27/2008     Cardiac pacemaker in situ 9/17/2014 2008 PPM Medtronic Sensia, model# SEDR01, serial# [id number]       Hearing loss      Heart murmur 5/23/2007    Overview:  Benign Echo     Hernia, abdominal      HTN (hypertension)      Hyperlipidemia with target LDL less than 130 5/23/2007    Overview:  ICD 10     Irregular heart beat     AFIB     Osteoarthritis of pelvic region 11/12/2008    Overview:  LW Modifier:  Rt, SHARON done 1/15/09 LW Onset:  5-6 years ; DJD Hip     Pacemaker      Palpitations      Primary cardiomyopathy (H) 11/4/2014     Problem list name updated by automated process. Provider to review     Prostate cancer (H) 6/26/2007    Overview:  R[place] Score:5+5=1 Radiation Therapy x 25 + Radioactive Seed Implant     Sick sinus syndrome (H)      Syncope       Objective      PHYSICAL EXAM   BP (!) 140/80   Ht 1.778 m (5' 10\")   Wt 80.7 kg (178 lb)   BMI 25.54 kg/m     Physical Exam  Constitutional:       Appearance: Normal appearance.   HENT:      Head: Normocephalic.      Nose: Nose normal.   Pulmonary:      Effort: Pulmonary effort is normal.   Skin:     General: Skin is warm.   Neurological:      Mental Status: He is alert and oriented to person, place, and time.   Psychiatric:         Speech: Speech normal.         Behavior: Behavior normal.         Cognition and Memory: He exhibits impaired recent memory.       LABORATORY     Lab Results   Component Value Date    PSA <0.01 08/16/2021    PSA <0.01 07/26/2019       Ref Range & Units  1:52 PM     Color Urine Colorless, Straw, Light Yellow, Yellow Yellow     Appearance Urine Clear Clear     Glucose Urine Negative mg/dL Negative     Bilirubin Urine Negative Negative     Ketones Urine Negative mg/dL Negative     " Specific Gravity Urine 1.003 - 1.035 1.020     Blood Urine Negative Negative     pH Urine 5.0 - 7.0 6.5     Protein Albumin Urine Negative mg/dL Negative     Urobilinogen Urine 0.2, 1.0 E.U./dL 0.2     Nitrite Urine Negative Negative     Leukocyte Esterase Urine Negative Negative      TESTING    PVR: 300 mL.      Assessment & Plan    1. Prostate cancer (H)    2. Urinary incontinence, unspecified type      I had the pleasure today meeting with Mr. Rucker to discuss his prostate cancer recheck.  His PSA remains undetectable at less than 0.01.  Would be reasonable to consider discontinuing following given his age, but the concern is that he has a history of [place] 10 disease.      -We will plan on PSA and return visit in 2 years.    Patient has new urinary incontinence which has started over the last year.  Is that he may not have a sensation that he needs to urinate or that he cannot get there in time.  UA is not concerning for infection.  Postvoid residual is elevated.  Some concern with placing him on an alpha-blocker due to possible lightheaded and dizziness and patient's advanced age.    -Would recommend timed and prompted voiding every 2-3 hours.    -Can consider adding on a alpha-blocker in the future if patient continues to have significant difficulties with urination.    -Follow up in 6 months for UA, post void residual, and office visit to check on urination.    Signed by:       Concepción Vazquez PA-C 8/18/2021

## 2021-08-18 NOTE — LETTER
8/18/2021       RE: Jose Rucker  [address] [place] [address]     Dear Colleague,    Thank you for referring your patient, Jose Rucker, to the [facility] [place] [facility]. Please see a copy of my visit note below.    Subjective      CHIEF COMPLAINT/REASON FOR VISIT   Patient of Dr. Huerta's seen on my calendar  Prostate cancer recheck       HISTORY OF PRESENT ILLNESS   Mr. Rucker is a very pleasant 89-year-old gentleman, who presents today for prostate cancer recheck.  Patient was diagnosed with [place] grade 10 prostate cancer in 2008.  He underwent S/P Combined hormonal therapy and radiotherapy in 2008.  His last hormone injection was in 2010.  Patient's PSA has remained undetectable at <0.01.  Patient was last seen 07/29/2019.    Patient denies any hematuria or dysuria.  He denies any significant concerns.  He wears Depends daily.  His wife notes that he has been having urinary incontinence over the last year.  She does not think that he realizes that he needs go the bathroom is slow to get there.  He will then have leakage into his undergarments.  Patient is not bothered by wearing Depends.      Much of history is obtained from the patient's wife due to him being hard of hearing and slightly confused.    The following portions of the patient's history were reviewed and updated as appropriate: allergies, current medications, past family history, past medical history, past social history, past surgical history, and problem list.     REVIEW OF SYSTEMS   Review of Systems   Constitutional: Negative for chills and fever.   HENT: Positive for hearing loss.    Respiratory: Negative for shortness of breath.    Cardiovascular: Negative for chest pain.   Gastrointestinal: Negative for nausea and vomiting.   Genitourinary: Negative for dysuria and hematuria.      Per HPI.     Patient Active Problem List   Diagnosis      "A-fib (H)     HTN (hypertension)     Bradycardia     Syncope     Cardiac pacemaker in situ     Sick sinus syndrome (H)     Primary cardiomyopathy (H)     Carpal tunnel syndrome     Disorder of penis     Hearing loss     Heart murmur     Hip joint replacement status     Hyperlipidemia with target LDL less than 130     Osteoarthritis of pelvic region     Palpitations     Prostate cancer (H)     Senile cataract     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)      Past Medical History:   Diagnosis Date     A-fib (H)      Bradycardia     PPM 5/27/2008     Cardiac pacemaker in situ 9/17/2014 2008 PPM Medtronic Sensia, model# SEDR01, serial# [id number]       Hearing loss      Heart murmur 5/23/2007    Overview:  Benign Echo     Hernia, abdominal      HTN (hypertension)      Hyperlipidemia with target LDL less than 130 5/23/2007    Overview:  ICD 10     Irregular heart beat     AFIB     Osteoarthritis of pelvic region 11/12/2008    Overview:  LW Modifier:  Rt, SHARON done 1/15/09 LW Onset:  5-6 years ; DJD Hip     Pacemaker      Palpitations      Primary cardiomyopathy (H) 11/4/2014     Problem list name updated by automated process. Provider to review     Prostate cancer (H) 6/26/2007    Overview:  Mekhi Score:5+5=1 Radiation Therapy x 25 + Radioactive Seed Implant     Sick sinus syndrome (H)      Syncope       Objective      PHYSICAL EXAM   BP (!) 140/80   Ht 1.778 m (5' 10\")   Wt 80.7 kg (178 lb)   BMI 25.54 kg/m     Physical Exam  Constitutional:       Appearance: Normal appearance.   HENT:      Head: Normocephalic.      Nose: Nose normal.   Pulmonary:      Effort: Pulmonary effort is normal.   Skin:     General: Skin is warm.   Neurological:      Mental Status: He is alert and oriented to person, place, and time.   Psychiatric:         Speech: Speech normal.         Behavior: Behavior normal.         Cognition and Memory: He exhibits impaired recent memory.       LABORATORY     Lab Results   Component " Value Date    PSA <0.01 08/16/2021    PSA <0.01 07/26/2019       Ref Range & Units  1:52 PM     Color Urine Colorless, Straw, Light Yellow, Yellow Yellow     Appearance Urine Clear Clear     Glucose Urine Negative mg/dL Negative     Bilirubin Urine Negative Negative     Ketones Urine Negative mg/dL Negative     Specific Gravity Urine 1.003 - 1.035 1.020     Blood Urine Negative Negative     pH Urine 5.0 - 7.0 6.5     Protein Albumin Urine Negative mg/dL Negative     Urobilinogen Urine 0.2, 1.0 E.U./dL 0.2     Nitrite Urine Negative Negative     Leukocyte Esterase Urine Negative Negative      TESTING    PVR: 300 mL.      Assessment & Plan    1. Prostate cancer (H)    2. Urinary incontinence, unspecified type      I had the pleasure today meeting with Mr. Rucker to discuss his prostate cancer recheck.  His PSA remains undetectable at less than 0.01.  Would be reasonable to consider discontinuing following given his age, but the concern is that he has a history of [place] 10 disease.      -We will plan on PSA and return visit in 2 years.    Patient has new urinary incontinence which has started over the last year.  Is that he may not have a sensation that he needs to urinate or that he cannot get there in time.  UA is not concerning for infection.  Postvoid residual is elevated.  Some concern with placing him on an alpha-blocker due to possible lightheaded and dizziness and patient's advanced age.    -Would recommend timed and prompted voiding every 2-3 hours.    -Can consider adding on a alpha-blocker in the future if patient continues to have significant difficulties with urination.    -Follow up in 6 months for UA, post void residual, and office visit to check on urination.    Signed by:       Concepción Vazquez PA-C 8/18/2021

## 2021-08-18 NOTE — NURSING NOTE
Chief Complaint   Patient presents with     Prostate Cancer     Here for bi-annual PSA check.   Keara Dallas LPN

## 2021-08-18 NOTE — PATIENT INSTRUCTIONS
PSA recheck in 2 years with visit.  Getting to the point where we may be able to stop check.  The concern is the history of [place] 6 cancer.    Timed and prompted voiding every 2-3 hours.    Possible prostate medication in the future.    Follow up in 6 months for UA, post void residual, and office visit to check on urination.

## 2021-09-20 ENCOUNTER — ANCILLARY PROCEDURE (OUTPATIENT)
Dept: CARDIOLOGY | Facility: CLINIC | Age: 86
End: 2021-09-20
Attending: INTERNAL MEDICINE
Payer: MEDICARE

## 2021-09-20 DIAGNOSIS — Z95.0 CARDIAC PACEMAKER IN SITU: ICD-10-CM

## 2021-09-20 PROCEDURE — 93294 REM INTERROG EVL PM/LDLS PM: CPT | Performed by: INTERNAL MEDICINE

## 2021-09-20 PROCEDURE — 93296 REM INTERROG EVL PM/IDS: CPT | Performed by: INTERNAL MEDICINE

## 2021-10-04 LAB
MDC_IDC_LEAD_IMPLANT_DT: NORMAL
MDC_IDC_LEAD_IMPLANT_DT: NORMAL
MDC_IDC_LEAD_LOCATION: NORMAL
MDC_IDC_LEAD_LOCATION: NORMAL
MDC_IDC_LEAD_MFG: NORMAL
MDC_IDC_LEAD_MFG: NORMAL
MDC_IDC_LEAD_MODEL: NORMAL
MDC_IDC_LEAD_MODEL: NORMAL
MDC_IDC_LEAD_POLARITY_TYPE: NORMAL
MDC_IDC_LEAD_POLARITY_TYPE: NORMAL
MDC_IDC_LEAD_SERIAL: NORMAL
MDC_IDC_LEAD_SERIAL: NORMAL
MDC_IDC_MSMT_BATTERY_DTM: NORMAL
MDC_IDC_MSMT_BATTERY_IMPEDANCE: 286 OHM
MDC_IDC_MSMT_BATTERY_REMAINING_LONGEVITY: 117 MO
MDC_IDC_MSMT_BATTERY_STATUS: NORMAL
MDC_IDC_MSMT_BATTERY_VOLTAGE: 2.78 V
MDC_IDC_MSMT_LEADCHNL_RA_IMPEDANCE_VALUE: 371 OHM
MDC_IDC_MSMT_LEADCHNL_RA_PACING_THRESHOLD_AMPLITUDE: 0.5 V
MDC_IDC_MSMT_LEADCHNL_RA_PACING_THRESHOLD_PULSEWIDTH: 0.4 MS
MDC_IDC_MSMT_LEADCHNL_RV_IMPEDANCE_VALUE: 380 OHM
MDC_IDC_MSMT_LEADCHNL_RV_PACING_THRESHOLD_AMPLITUDE: 1.12 V
MDC_IDC_MSMT_LEADCHNL_RV_PACING_THRESHOLD_PULSEWIDTH: 0.4 MS
MDC_IDC_PG_IMPLANT_DTM: NORMAL
MDC_IDC_PG_MFG: NORMAL
MDC_IDC_PG_MODEL: NORMAL
MDC_IDC_PG_SERIAL: NORMAL
MDC_IDC_PG_TYPE: NORMAL
MDC_IDC_SESS_CLINIC_NAME: NORMAL
MDC_IDC_SESS_DTM: NORMAL
MDC_IDC_SESS_TYPE: NORMAL
MDC_IDC_SET_BRADY_AT_MODE_SWITCH_MODE: NORMAL
MDC_IDC_SET_BRADY_AT_MODE_SWITCH_RATE: 175 {BEATS}/MIN
MDC_IDC_SET_BRADY_LOWRATE: 60 {BEATS}/MIN
MDC_IDC_SET_BRADY_MAX_SENSOR_RATE: 130 {BEATS}/MIN
MDC_IDC_SET_BRADY_MAX_TRACKING_RATE: 130 {BEATS}/MIN
MDC_IDC_SET_BRADY_MODE: NORMAL
MDC_IDC_SET_BRADY_PAV_DELAY_LOW: 150 MS
MDC_IDC_SET_BRADY_SAV_DELAY_LOW: 120 MS
MDC_IDC_SET_LEADCHNL_RA_PACING_AMPLITUDE: 1.5 V
MDC_IDC_SET_LEADCHNL_RA_PACING_CAPTURE_MODE: NORMAL
MDC_IDC_SET_LEADCHNL_RA_PACING_POLARITY: NORMAL
MDC_IDC_SET_LEADCHNL_RA_PACING_PULSEWIDTH: 0.4 MS
MDC_IDC_SET_LEADCHNL_RA_SENSING_POLARITY: NORMAL
MDC_IDC_SET_LEADCHNL_RA_SENSING_SENSITIVITY: 0.35 MV
MDC_IDC_SET_LEADCHNL_RV_PACING_AMPLITUDE: 2.25 V
MDC_IDC_SET_LEADCHNL_RV_PACING_CAPTURE_MODE: NORMAL
MDC_IDC_SET_LEADCHNL_RV_PACING_POLARITY: NORMAL
MDC_IDC_SET_LEADCHNL_RV_PACING_PULSEWIDTH: 0.4 MS
MDC_IDC_SET_LEADCHNL_RV_SENSING_POLARITY: NORMAL
MDC_IDC_SET_LEADCHNL_RV_SENSING_SENSITIVITY: 5.6 MV
MDC_IDC_SET_ZONE_DETECTION_INTERVAL: 333.33 MS
MDC_IDC_SET_ZONE_DETECTION_INTERVAL: 342.86 MS
MDC_IDC_SET_ZONE_TYPE: NORMAL
MDC_IDC_SET_ZONE_TYPE: NORMAL
MDC_IDC_STAT_AT_BURDEN_PERCENT: 0.1 %
MDC_IDC_STAT_AT_DTM_END: NORMAL
MDC_IDC_STAT_AT_DTM_START: NORMAL
MDC_IDC_STAT_AT_MODE_SW_COUNT: 16
MDC_IDC_STAT_BRADY_AP_VP_PERCENT: 0 %
MDC_IDC_STAT_BRADY_AP_VS_PERCENT: 98 %
MDC_IDC_STAT_BRADY_AS_VP_PERCENT: 0 %
MDC_IDC_STAT_BRADY_AS_VS_PERCENT: 2 %
MDC_IDC_STAT_BRADY_DTM_END: NORMAL
MDC_IDC_STAT_BRADY_DTM_START: NORMAL
MDC_IDC_STAT_EPISODE_RECENT_COUNT: 1
MDC_IDC_STAT_EPISODE_RECENT_COUNT: 6
MDC_IDC_STAT_EPISODE_RECENT_COUNT_DTM_END: NORMAL
MDC_IDC_STAT_EPISODE_RECENT_COUNT_DTM_END: NORMAL
MDC_IDC_STAT_EPISODE_RECENT_COUNT_DTM_START: NORMAL
MDC_IDC_STAT_EPISODE_RECENT_COUNT_DTM_START: NORMAL
MDC_IDC_STAT_EPISODE_TYPE: NORMAL
MDC_IDC_STAT_EPISODE_TYPE: NORMAL

## 2021-11-18 ENCOUNTER — HOSPITAL ENCOUNTER (OUTPATIENT)
Dept: CARDIOLOGY | Facility: CLINIC | Age: 86
Discharge: HOME | End: 2021-11-18
Attending: INTERNAL MEDICINE | Admitting: INTERNAL MEDICINE
Payer: MEDICARE

## 2021-11-18 DIAGNOSIS — I42.8 OTHER CARDIOMYOPATHY (H): ICD-10-CM

## 2021-11-18 LAB — LVEF ECHO: NORMAL

## 2021-11-18 PROCEDURE — 93306 TTE W/DOPPLER COMPLETE: CPT

## 2021-11-18 PROCEDURE — 93306 TTE W/DOPPLER COMPLETE: CPT | Mod: 26 | Performed by: INTERNAL MEDICINE

## 2021-11-19 ENCOUNTER — LAB (OUTPATIENT)
Dept: LAB | Facility: CLINIC | Age: 86
End: 2021-11-19
Payer: MEDICARE

## 2021-11-19 ENCOUNTER — OFFICE VISIT (OUTPATIENT)
Dept: CARDIOLOGY | Facility: CLINIC | Age: 86
End: 2021-11-19
Attending: INTERNAL MEDICINE
Payer: MEDICARE

## 2021-11-19 VITALS
OXYGEN SATURATION: 94 % | WEIGHT: 173.3 LBS | HEIGHT: 71 IN | SYSTOLIC BLOOD PRESSURE: 142 MMHG | HEART RATE: 90 BPM | DIASTOLIC BLOOD PRESSURE: 72 MMHG | BODY MASS INDEX: 24.26 KG/M2

## 2021-11-19 DIAGNOSIS — R06.02 SHORTNESS OF BREATH: ICD-10-CM

## 2021-11-19 DIAGNOSIS — I10 ESSENTIAL HYPERTENSION, BENIGN: ICD-10-CM

## 2021-11-19 DIAGNOSIS — I49.5 SICK SINUS SYNDROME (H): Primary | ICD-10-CM

## 2021-11-19 DIAGNOSIS — E78.5 HYPERLIPIDEMIA LDL GOAL <70: ICD-10-CM

## 2021-11-19 DIAGNOSIS — I42.9 CARDIOMYOPATHY, UNSPECIFIED TYPE (H): ICD-10-CM

## 2021-11-19 DIAGNOSIS — I77.810 ASCENDING AORTA DILATATION (H): ICD-10-CM

## 2021-11-19 DIAGNOSIS — I48.0 PAROXYSMAL ATRIAL FIBRILLATION (H): ICD-10-CM

## 2021-11-19 DIAGNOSIS — Z95.0 PACEMAKER: ICD-10-CM

## 2021-11-19 DIAGNOSIS — I42.8 OTHER CARDIOMYOPATHY (H): ICD-10-CM

## 2021-11-19 LAB
ALT SERPL W P-5'-P-CCNC: 21 U/L (ref 0–70)
ANION GAP SERPL CALCULATED.3IONS-SCNC: 7 MMOL/L (ref 3–14)
BUN SERPL-MCNC: 22 MG/DL (ref 7–30)
CALCIUM SERPL-MCNC: 9.1 MG/DL (ref 8.5–10.1)
CHLORIDE BLD-SCNC: 102 MMOL/L (ref 94–109)
CHOLEST SERPL-MCNC: 173 MG/DL
CO2 SERPL-SCNC: 28 MMOL/L (ref 20–32)
CREAT SERPL-MCNC: 1.52 MG/DL (ref 0.66–1.25)
FASTING STATUS PATIENT QL REPORTED: YES
GFR SERPL CREATININE-BSD FRML MDRD: 40 ML/MIN/1.73M2
GLUCOSE BLD-MCNC: 107 MG/DL (ref 70–99)
HDLC SERPL-MCNC: 47 MG/DL
LDLC SERPL CALC-MCNC: 94 MG/DL
NONHDLC SERPL-MCNC: 126 MG/DL
POTASSIUM BLD-SCNC: 3.9 MMOL/L (ref 3.4–5.3)
SODIUM SERPL-SCNC: 137 MMOL/L (ref 133–144)
TRIGL SERPL-MCNC: 159 MG/DL

## 2021-11-19 PROCEDURE — 84460 ALANINE AMINO (ALT) (SGPT): CPT

## 2021-11-19 PROCEDURE — 36415 COLL VENOUS BLD VENIPUNCTURE: CPT

## 2021-11-19 PROCEDURE — 80048 BASIC METABOLIC PNL TOTAL CA: CPT

## 2021-11-19 PROCEDURE — 99214 OFFICE O/P EST MOD 30 MIN: CPT | Performed by: INTERNAL MEDICINE

## 2021-11-19 PROCEDURE — 80061 LIPID PANEL: CPT

## 2021-11-19 PROCEDURE — 93000 ELECTROCARDIOGRAM COMPLETE: CPT | Performed by: INTERNAL MEDICINE

## 2021-11-19 RX ORDER — LISINOPRIL 2.5 MG/1
2.5 TABLET ORAL DAILY
Qty: 90 TABLET | Refills: 3 | Status: SHIPPED | OUTPATIENT
Start: 2021-11-19

## 2021-11-19 RX ORDER — SIMVASTATIN 20 MG
20 TABLET ORAL AT BEDTIME
Qty: 90 TABLET | Refills: 3 | Status: SHIPPED | OUTPATIENT
Start: 2021-11-19 | End: 2022-01-01

## 2021-11-19 RX ORDER — METOPROLOL SUCCINATE 25 MG/1
25 TABLET, EXTENDED RELEASE ORAL 2 TIMES DAILY
Qty: 180 TABLET | Refills: 3 | Status: SHIPPED | OUTPATIENT
Start: 2021-11-19 | End: 2022-01-01

## 2021-11-19 RX ORDER — FUROSEMIDE 40 MG
40 TABLET ORAL DAILY
Qty: 90 TABLET | Refills: 3 | Status: SHIPPED | OUTPATIENT
Start: 2021-11-19 | End: 2021-12-07

## 2021-11-19 ASSESSMENT — MIFFLIN-ST. JEOR: SCORE: 1468.21

## 2021-11-19 NOTE — PROGRESS NOTES
HPI and Plan:   See dictation          Orders Placed This Encounter   Procedures     Basic metabolic panel     Lipid Profile     ALT     Basic metabolic panel     Follow-Up with Nurse     Follow-Up with Cardiologist     EKG 12-lead complete w/read - Clinics (performed today)     Echocardiogram Complete       Orders Placed This Encounter   Medications     apixaban ANTICOAGULANT (ELIQUIS) 5 MG tablet     Sig: Take 1 tablet (5 mg) by mouth 2 times daily     Dispense:  180 tablet     Refill:  3     furosemide (LASIX) 40 MG tablet     Sig: Take 1 tablet (40 mg) by mouth daily     Dispense:  90 tablet     Refill:  3     lisinopril (ZESTRIL) 2.5 MG tablet     Sig: Take 1 tablet (2.5 mg) by mouth daily     Dispense:  90 tablet     Refill:  3     metoprolol succinate ER (TOPROL-XL) 25 MG 24 hr tablet     Sig: Take 1 tablet (25 mg) by mouth 2 times daily     Dispense:  180 tablet     Refill:  3     simvastatin (ZOCOR) 20 MG tablet     Sig: Take 1 tablet (20 mg) by mouth At Bedtime     Dispense:  90 tablet     Refill:  3       Medications Discontinued During This Encounter   Medication Reason     apixaban ANTICOAGULANT (ELIQUIS) 5 MG tablet Reorder     furosemide (LASIX) 40 MG tablet Reorder     metoprolol succinate ER (TOPROL-XL) 25 MG 24 hr tablet Reorder     simvastatin (ZOCOR) 20 MG tablet Reorder     lisinopril (ZESTRIL) 2.5 MG tablet Reorder         Encounter Diagnoses   Name Primary?     Paroxysmal atrial fibrillation (H)      Hyperlipidemia LDL goal <70      Essential hypertension, benign      Shortness of breath      Other cardiomyopathy (H)      Sick sinus syndrome (H) Yes     Pacemaker      Cardiomyopathy, unspecified type (H)      Ascending aorta dilatation (H)        CURRENT MEDICATIONS:  Current Outpatient Medications   Medication Sig Dispense Refill     apixaban ANTICOAGULANT (ELIQUIS) 5 MG tablet Take 1 tablet (5 mg) by mouth 2 times daily 180 tablet 3     Diphenhydramine-APAP, sleep, (TYLENOL PM EXTRA STRENGTH  PO) Take 500 mg by mouth daily       furosemide (LASIX) 40 MG tablet Take 1 tablet (40 mg) by mouth daily 90 tablet 3     lisinopril (ZESTRIL) 2.5 MG tablet Take 1 tablet (2.5 mg) by mouth daily 90 tablet 3     metoprolol succinate ER (TOPROL-XL) 25 MG 24 hr tablet Take 1 tablet (25 mg) by mouth 2 times daily 180 tablet 3     Multiple Minerals-Vitamins (PROSTEON PO) Take 500 mg by mouth 2 tablets twice daily        simvastatin (ZOCOR) 20 MG tablet Take 1 tablet (20 mg) by mouth At Bedtime 90 tablet 3       ALLERGIES     Allergies   Allergen Reactions     Penicillin G Rash     And patient had an awful smell        PAST MEDICAL HISTORY:  Past Medical History:   Diagnosis Date     A-fib (H)      Bradycardia     PPM 5/27/2008     Cardiac pacemaker in situ 9/17/2014 2008 PPM Medtronic Sensia, model# SEDR01, serial# [id number]       Hearing loss      Heart murmur 5/23/2007    Overview:  Benign Echo     Hernia, abdominal      HTN (hypertension)      Hyperlipidemia with target LDL less than 130 5/23/2007    Overview:  ICD 10     Irregular heart beat     AFIB     Osteoarthritis of pelvic region 11/12/2008    Overview:  LW Modifier:  Rt, SHARON done 1/15/09 LW Onset:  5-6 years ; DJD Hip     Pacemaker      Palpitations      Primary cardiomyopathy (H) 11/4/2014     Problem list name updated by automated process. Provider to review     Prostate cancer (H) 6/26/2007    Overview:  Mekhi Score:5+5=1 Radiation Therapy x 25 + Radioactive Seed Implant     Sick sinus syndrome (H)      Syncope      Syncope        PAST SURGICAL HISTORY:  Past Surgical History:   Procedure Laterality Date     APPLY WOUND VAC Left 7/16/2020    Procedure: WOUND VAC PLACEMENT;  Surgeon: Phil Orellana MD;  Location: [facility] OR     CYSTOSCOPY       EP PPM GENERATOR CHANGE DUAL  2/10/16     HERNIA REPAIR       IMPLANT PACEMAKER  5/7/08    Medtronic Sensia, model# SEDR01, serial# [id number]     IRRIGATION AND DEBRIDEMENT LOWER EXTREMITY, COMBINED Left  2020    Procedure: IRRIGATION AND DEBRIDEMENT LEFT LEG WOUND;  Surgeon: Phil Orellana MD;  Location: [facility] OR     PROSTATE SURGERY         FAMILY HISTORY:  Family History   Problem Relation Age of Onset     Heart Disease Mother        SOCIAL HISTORY:  Social History     Socioeconomic History     Marital status:      Spouse name: None     Number of children: None     Years of education: None     Highest education level: None   Occupational History     None   Tobacco Use     Smoking status: Former Smoker     Packs/day: 0.50     Years: 6.00     Pack years: 3.00     Types: Cigarettes     Quit date:      Years since quittin.9     Smokeless tobacco: Never Used   Substance and Sexual Activity     Alcohol use: Yes     Comment: 1 beer daily     Drug use: Never     Sexual activity: None   Other Topics Concern      Service Not Asked     Blood Transfusions Not Asked     Caffeine Concern No     Comment: 2 cups daily     Occupational Exposure Not Asked     Hobby Hazards Not Asked     Sleep Concern Not Asked     Stress Concern Not Asked     Weight Concern Not Asked     Special Diet No     Back Care Not Asked     Exercise No     Comment: some stairs, yardwork, uses exercise ball     Bike Helmet Not Asked     Seat Belt Not Asked     Self-Exams Not Asked     Parent/sibling w/ CABG, MI or angioplasty before 65F 55M? Not Asked   Social History Narrative     None     Social Determinants of Health     Financial Resource Strain: Not on file   Food Insecurity: Not on file   Transportation Needs: Not on file   Physical Activity: Not on file   Stress: Not on file   Social Connections: Not on file   Intimate Partner Violence: Not on file   Housing Stability: Not on file       Review of Systems:  Skin:  Negative       Eyes:  Positive for glasses    ENT:  Positive for hearing loss    Respiratory:  Negative       Cardiovascular:  Negative      Gastroenterology: Negative      Genitourinary:  Negative     "  Musculoskeletal:  Positive for arthritis hands  Neurologic:  Negative      Psychiatric:  Negative      Heme/Lymph/Imm:  Negative      Endocrine:  Negative        Physical Exam:  Vitals: BP (!) 142/72   Pulse 90   Ht 1.803 m (5' 11\")   Wt 78.6 kg (173 lb 4.8 oz)   SpO2 94%   BMI 24.17 kg/m      Constitutional:  cooperative, alert and oriented, well developed, well nourished, in no acute distress;cooperative frail      Skin:  warm and dry to the touch, no apparent skin lesions or masses noted   pacemaker incision in the left infraclavicular area was well-healed      Head:  normocephalic, no masses or lesions        Eyes:  pupils equal and round;conjunctivae and lids unremarkable;sclera white   + arcus    Lymph:      ENT:  no pallor or cyanosis hearing aide(s) present      Neck:  JVP normal;no carotid bruit;carotid pulses are full and equal bilaterally        Respiratory:  normal breath sounds, clear to auscultation, normal A-P diameter, normal symmetry, normal respiratory excursion, no use of accessory muscles  (Occassional coarse crackles at right base.)       Cardiac: regular rhythm, normal S1/S2, no S3 or S4, apical impulse not displaced, no murmurs, gallops or rubs                pulses full and equal                                        GI:  not assessed this visit        Extremities and Muscular Skeletal:  no deformities, clubbing, cyanosis, erythema observed;no edema stasis pigmentation            Neurological:  no gross motor deficits;affect appropriate        Psych:  Alert and Oriented x 3        CC  Mode Ya MD  [id number]  [place] [id number]              "

## 2021-11-19 NOTE — PROGRESS NOTES
Service Date: 11/19/2021    HISTORY OF PRESENT ILLNESS:  It was my pleasure to see your patient, Jose Rucker, in followup.  This is a patient with a past history of an idiopathic mild cardiomyopathy, sick sinus syndrome and paroxysmal atrial fibrillation as well as essential hypertension.  This patient also has a history of a dilated ascending aorta.  With that background in mind, the patient is feeling well.  The last time we saw him, he was having episodes of feeling weak and lightheaded.  This was felt to be mainly due to the fact that his blood pressure was dropping.  We did cut back a significant amount of both the lisinopril and the metoprolol medication.  He is having none of those episodes now, but his blood pressure appears to be on the higher side here today at 142/74 when I rechecked it at the end of our visit.  His wife tells me that their 2-year-old automated blood pressure cuff has his blood pressures typically in the 120s.  Typically around 120/72 is his usual blood pressure at home.  We will have to confirm that their blood pressure cuff is working correctly.  The 12-lead electrocardiogram today showed the patient is atrially pacing and ventricular sensing with a long interval between the atrial pace and the ventricular sensing.  The echocardiogram shows that his ejection fraction is normal at 55%-60% with only grade I diastolic dysfunction, he has trace to mild aortic regurgitation, and the aortic root dilatation is only mild at 3.8 cm and the ascending aortic dilatation is also mild at 3.7 cm.  Interrogation of his pacemaker was performed on 09/20.  This showed that he had 10 mode switches, which was less than 0.1 percent of the time, so the vast majority of the time he is in sinus rhythm.  The longest episode of atrial fibrillation he had was 2 hours 22 minutes, and at that time the ventricular rates were between 100-160 beats per minute.    We did notice today on the basic metabolic profile  that his creatinine has risen significantly from this time last year.  His creatinine last year was 1.19, and earlier in that year it was 1.08 and before that 1.06.  It is 1.52 today, which is a significant rise.  There has been no change in his medications that we can see.    IMPRESSION:    1.  Cardiomyopathy.  The patient's ejection fraction is normal on medical therapy on recent echocardiography performed yesterday.  2.  Mild dilatation of the ascending aorta and the aortic root.  3.  Paroxysmal atrial fibrillation, fully anticoagulated with apixaban.  The vast majority of the time, the patient is in sinus rhythm, and less than 0.1 percent of the time he has mode switches indicating atrial fibrillation.  He had 1 episode of 2 hours when the heart rate was between 100-160.  He did not feel that episode.  4.  Blood pressure measurements here appear to show the blood pressure is on the higher side, but his blood pressure measurements at home show that his blood pressure is in the normal range.  We will have to confirm that his automated blood pressure cuff is working correctly.  5.  Renal insufficiency.  The cause of this is unclear, since we have changed none of his medications.  He possibly at this stage may be over-diuresed.  It is highly unlikely to be due to the lisinopril, which he has had for years and at a significantly higher dose in the past.    PLAN:  1.  We will have the patient come in in 2 weeks' time to compare his blood pressure machine with our blood pressure cuff.  2.  I am going to cut back the furosemide from 40 mg per day to 20 mg per day, and when he comes back in 2 weeks' time I am going to recheck the basic metabolic profile.  Otherwise, we will see the patient back again in 1 year's time, and we will repeat his echocardiogram, basic metabolic profile and lipids at that stage.  [facility] will follow him independently.    It has been my pleasure to be involved the care of very nice  patient, but as always, they have been told to contact me if they  have any questions or any concerns.    Mode Ya MD, FACC    cc:  [place] [address]    Mode Styles MD, FACC        D: 2021   T: 2021   MT: clive    Name:     IDANIA BEDOLLA  MRN:      [id number]        Account:      [id number]   :      1931           Service Date: 2021       Document: [id number]

## 2021-11-19 NOTE — LETTER
11/19/2021    Mikey Patton MD  [address] [facility] [address]    RE: Jose Rucker       Dear Colleague,    I had the pleasure of seeing Jose Rucker in the [facility].    HPI and Plan:   See dictation          Orders Placed This Encounter   Procedures     Basic metabolic panel     Lipid Profile     ALT     Basic metabolic panel     Follow-Up with Nurse     Follow-Up with Cardiologist     EKG 12-lead complete w/read - Clinics (performed today)     Echocardiogram Complete       Orders Placed This Encounter   Medications     apixaban ANTICOAGULANT (ELIQUIS) 5 MG tablet     Sig: Take 1 tablet (5 mg) by mouth 2 times daily     Dispense:  180 tablet     Refill:  3     furosemide (LASIX) 40 MG tablet     Sig: Take 1 tablet (40 mg) by mouth daily     Dispense:  90 tablet     Refill:  3     lisinopril (ZESTRIL) 2.5 MG tablet     Sig: Take 1 tablet (2.5 mg) by mouth daily     Dispense:  90 tablet     Refill:  3     metoprolol succinate ER (TOPROL-XL) 25 MG 24 hr tablet     Sig: Take 1 tablet (25 mg) by mouth 2 times daily     Dispense:  180 tablet     Refill:  3     simvastatin (ZOCOR) 20 MG tablet     Sig: Take 1 tablet (20 mg) by mouth At Bedtime     Dispense:  90 tablet     Refill:  3       Medications Discontinued During This Encounter   Medication Reason     apixaban ANTICOAGULANT (ELIQUIS) 5 MG tablet Reorder     furosemide (LASIX) 40 MG tablet Reorder     metoprolol succinate ER (TOPROL-XL) 25 MG 24 hr tablet Reorder     simvastatin (ZOCOR) 20 MG tablet Reorder     lisinopril (ZESTRIL) 2.5 MG tablet Reorder         Encounter Diagnoses   Name Primary?     Paroxysmal atrial fibrillation (H)      Hyperlipidemia LDL goal <70      Essential hypertension, benign      Shortness of breath      Other cardiomyopathy (H)      Sick sinus syndrome (H) Yes     Pacemaker      Cardiomyopathy, unspecified type (H)      Ascending aorta dilatation (H)         CURRENT MEDICATIONS:  Current Outpatient Medications   Medication Sig Dispense Refill     apixaban ANTICOAGULANT (ELIQUIS) 5 MG tablet Take 1 tablet (5 mg) by mouth 2 times daily 180 tablet 3     Diphenhydramine-APAP, sleep, (TYLENOL PM EXTRA STRENGTH PO) Take 500 mg by mouth daily       furosemide (LASIX) 40 MG tablet Take 1 tablet (40 mg) by mouth daily 90 tablet 3     lisinopril (ZESTRIL) 2.5 MG tablet Take 1 tablet (2.5 mg) by mouth daily 90 tablet 3     metoprolol succinate ER (TOPROL-XL) 25 MG 24 hr tablet Take 1 tablet (25 mg) by mouth 2 times daily 180 tablet 3     Multiple Minerals-Vitamins (PROSTEON PO) Take 500 mg by mouth 2 tablets twice daily        simvastatin (ZOCOR) 20 MG tablet Take 1 tablet (20 mg) by mouth At Bedtime 90 tablet 3       ALLERGIES     Allergies   Allergen Reactions     Penicillin G Rash     And patient had an awful smell        PAST MEDICAL HISTORY:  Past Medical History:   Diagnosis Date     A-fib (H)      Bradycardia     PPM 5/27/2008     Cardiac pacemaker in situ 9/17/2014 2008 PPM Medtronic Sensia, model# SEDR01, serial# [id number]       Hearing loss      Heart murmur 5/23/2007    Overview:  Benign Echo     Hernia, abdominal      HTN (hypertension)      Hyperlipidemia with target LDL less than 130 5/23/2007    Overview:  ICD 10     Irregular heart beat     AFIB     Osteoarthritis of pelvic region 11/12/2008    Overview:  LW Modifier:  Rt, SHARON done 1/15/09 LW Onset:  5-6 years ; DJD Hip     Pacemaker      Palpitations      Primary cardiomyopathy (H) 11/4/2014     Problem list name updated by automated process. Provider to review     Prostate cancer (H) 6/26/2007    Overview:  Hornbeck Score:5+5=1 Radiation Therapy x 25 + Radioactive Seed Implant     Sick sinus syndrome (H)      Syncope      Syncope        PAST SURGICAL HISTORY:  Past Surgical History:   Procedure Laterality Date     APPLY WOUND VAC Left 7/16/2020    Procedure: WOUND VAC PLACEMENT;  Surgeon: Phil Orellana  MD LALI;  Location: [facility] OR     CYSTOSCOPY       EP PPM GENERATOR CHANGE DUAL  2/10/16     HERNIA REPAIR       IMPLANT PACEMAKER  08    Medtronic Sensia, model# SEDR01, serial# [id number]     IRRIGATION AND DEBRIDEMENT LOWER EXTREMITY, COMBINED Left 2020    Procedure: IRRIGATION AND DEBRIDEMENT LEFT LEG WOUND;  Surgeon: Phil Orellana MD;  Location: [facility] OR     PROSTATE SURGERY         FAMILY HISTORY:  Family History   Problem Relation Age of Onset     Heart Disease Mother        SOCIAL HISTORY:  Social History     Socioeconomic History     Marital status:      Spouse name: None     Number of children: None     Years of education: None     Highest education level: None   Occupational History     None   Tobacco Use     Smoking status: Former Smoker     Packs/day: 0.50     Years: 6.00     Pack years: 3.00     Types: Cigarettes     Quit date:      Years since quittin.9     Smokeless tobacco: Never Used   Substance and Sexual Activity     Alcohol use: Yes     Comment: 1 beer daily     Drug use: Never     Sexual activity: None   Other Topics Concern      Service Not Asked     Blood Transfusions Not Asked     Caffeine Concern No     Comment: 2 cups daily     Occupational Exposure Not Asked     Hobby Hazards Not Asked     Sleep Concern Not Asked     Stress Concern Not Asked     Weight Concern Not Asked     Special Diet No     Back Care Not Asked     Exercise No     Comment: some stairs, yardwork, uses exercise ball     Bike Helmet Not Asked     Seat Belt Not Asked     Self-Exams Not Asked     Parent/sibling w/ CABG, MI or angioplasty before 65F 55M? Not Asked   Social History Narrative     None     Social Determinants of Health     Financial Resource Strain: Not on file   Food Insecurity: Not on file   Transportation Needs: Not on file   Physical Activity: Not on file   Stress: Not on file   Social Connections: Not on file   Intimate Partner Violence: Not on file   Housing Stability: Not on  "file       Review of Systems:  Skin:  Negative       Eyes:  Positive for glasses    ENT:  Positive for hearing loss    Respiratory:  Negative       Cardiovascular:  Negative      Gastroenterology: Negative      Genitourinary:  Negative      Musculoskeletal:  Positive for arthritis hands  Neurologic:  Negative      Psychiatric:  Negative      Heme/Lymph/Imm:  Negative      Endocrine:  Negative        Physical Exam:  Vitals: BP (!) 142/72   Pulse 90   Ht 1.803 m (5' 11\")   Wt 78.6 kg (173 lb 4.8 oz)   SpO2 94%   BMI 24.17 kg/m      Constitutional:  cooperative, alert and oriented, well developed, well nourished, in no acute distress;cooperative frail      Skin:  warm and dry to the touch, no apparent skin lesions or masses noted   pacemaker incision in the left infraclavicular area was well-healed      Head:  normocephalic, no masses or lesions        Eyes:  pupils equal and round;conjunctivae and lids unremarkable;sclera white   + arcus    Lymph:      ENT:  no pallor or cyanosis hearing aide(s) present      Neck:  JVP normal;no carotid bruit;carotid pulses are full and equal bilaterally        Respiratory:  normal breath sounds, clear to auscultation, normal A-P diameter, normal symmetry, normal respiratory excursion, no use of accessory muscles  (Occassional coarse crackles at right base.)       Cardiac: regular rhythm, normal S1/S2, no S3 or S4, apical impulse not displaced, no murmurs, gallops or rubs                pulses full and equal                                        GI:  not assessed this visit        Extremities and Muscular Skeletal:  no deformities, clubbing, cyanosis, erythema observed;no edema stasis pigmentation            Neurological:  no gross motor deficits;affect appropriate        Psych:  Alert and Oriented x 3        CC  Mode Ya MD  [address]  [place] [address]    Thank you for allowing me to participate in the care of your patient.      Sincerely, "     Mode Ya MD, MD     [facility]  cc:   Mode Ya MD  [address]  [place] [address]

## 2021-12-03 ENCOUNTER — DOCUMENTATION ONLY (OUTPATIENT)
Dept: CARDIOLOGY | Facility: CLINIC | Age: 86
End: 2021-12-03

## 2021-12-03 ENCOUNTER — ALLIED HEALTH/NURSE VISIT (OUTPATIENT)
Dept: CARDIOLOGY | Facility: CLINIC | Age: 86
End: 2021-12-03
Attending: INTERNAL MEDICINE
Payer: MEDICARE

## 2021-12-03 ENCOUNTER — LAB (OUTPATIENT)
Dept: LAB | Facility: CLINIC | Age: 86
End: 2021-12-03
Attending: INTERNAL MEDICINE
Payer: MEDICARE

## 2021-12-03 VITALS — HEART RATE: 62 BPM | DIASTOLIC BLOOD PRESSURE: 80 MMHG | SYSTOLIC BLOOD PRESSURE: 138 MMHG

## 2021-12-03 DIAGNOSIS — I10 ESSENTIAL HYPERTENSION, BENIGN: Primary | ICD-10-CM

## 2021-12-03 DIAGNOSIS — R06.02 SHORTNESS OF BREATH: ICD-10-CM

## 2021-12-03 DIAGNOSIS — I10 ESSENTIAL HYPERTENSION, BENIGN: ICD-10-CM

## 2021-12-03 DIAGNOSIS — I42.9 CARDIOMYOPATHY, UNSPECIFIED TYPE (H): ICD-10-CM

## 2021-12-03 LAB
ANION GAP SERPL CALCULATED.3IONS-SCNC: 6 MMOL/L (ref 3–14)
BUN SERPL-MCNC: 19 MG/DL (ref 7–30)
CALCIUM SERPL-MCNC: 9.4 MG/DL (ref 8.5–10.1)
CHLORIDE BLD-SCNC: 106 MMOL/L (ref 94–109)
CO2 SERPL-SCNC: 25 MMOL/L (ref 20–32)
CREAT SERPL-MCNC: 1.29 MG/DL (ref 0.66–1.25)
GFR SERPL CREATININE-BSD FRML MDRD: 48 ML/MIN/1.73M2
GLUCOSE BLD-MCNC: 98 MG/DL (ref 70–99)
POTASSIUM BLD-SCNC: 4.4 MMOL/L (ref 3.4–5.3)
SODIUM SERPL-SCNC: 137 MMOL/L (ref 133–144)

## 2021-12-03 PROCEDURE — 80048 BASIC METABOLIC PNL TOTAL CA: CPT | Performed by: INTERNAL MEDICINE

## 2021-12-03 PROCEDURE — 99207 PR NO CHARGE LOS: CPT

## 2021-12-03 PROCEDURE — 36415 COLL VENOUS BLD VENIPUNCTURE: CPT | Performed by: INTERNAL MEDICINE

## 2021-12-03 NOTE — PROGRESS NOTES
[facility] BLOOD PRESSURE CHECK     Last office visit: 11/19/21    Previous blood pressure: 142/72 mm Hg  Previous heart rate: 90 bpm      Time of visit: 9:30 am    Morning medications were taken at: 730 am     Today's blood pressure: 138/80 mm Hg  Today's heart rate: 62 bpm     Home monitor blood pressure: 144/82 mmHg  Home monitor heart rate: 82 bpm      Additional Comments: pt reports home bp yesterday of 119/64  With a pulse of 80. Pt states home bp's usually run around that number.    They would like a phone call regarding results of today's blood tests. Wife states leaving a message is fine.      Results routed to: team 5, Dr Jenny Styles      Ordering Provider: Dr. Ya  In clinic Provider: Dr Duran

## 2021-12-03 NOTE — PROGRESS NOTES
Reviewed BMP showing   Recent Labs   Lab Test 12/03/21  0914 11/19/21  0925    137   POTASSIUM 4.4 3.9   CHLORIDE 106 102   CO2 25 28   ANIONGAP 6 7   GLC 98 107*   BUN 19 22   CR 1.29* 1.52*   ENEDELIA 9.4 9.1

## 2021-12-06 NOTE — TELEPHONE ENCOUNTER
Renal function significantly improved. Does need to bring his BP machine to compare with ours. BP is at upper limit of normal. Thx

## 2021-12-07 RX ORDER — FUROSEMIDE 40 MG
20 TABLET ORAL DAILY
Qty: 90 TABLET | Refills: 3
Start: 2021-12-07 | End: 2022-01-01

## 2021-12-07 NOTE — PROGRESS NOTES
Attempted to call pt,spoke with pt's wife. Informed her of results & recommendations from Dr. Ya. She will continue furosemide 20 mg daily. Wife states he is not having increased shortness of breath or swelling in his extremities. They will bring home BP cuff to next office visit for continued correlation. Herlinda TORRES

## 2022-01-01 ENCOUNTER — APPOINTMENT (OUTPATIENT)
Dept: GENERAL RADIOLOGY | Facility: CLINIC | Age: 87
DRG: 064 | End: 2022-01-01
Attending: INTERNAL MEDICINE
Payer: MEDICARE

## 2022-01-01 ENCOUNTER — HOSPITAL ENCOUNTER (INPATIENT)
Facility: CLINIC | Age: 87
LOS: 11 days | DRG: 064 | End: 2022-12-09
Attending: EMERGENCY MEDICINE | Admitting: HOSPITALIST
Payer: MEDICARE

## 2022-01-01 ENCOUNTER — HOSPITAL ENCOUNTER (OUTPATIENT)
Dept: CARDIOLOGY | Facility: CLINIC | Age: 87
Discharge: HOME | End: 2022-11-15
Attending: INTERNAL MEDICINE | Admitting: INTERNAL MEDICINE
Payer: MEDICARE

## 2022-01-01 ENCOUNTER — APPOINTMENT (OUTPATIENT)
Dept: CT IMAGING | Facility: CLINIC | Age: 87
DRG: 064 | End: 2022-01-01
Attending: HOSPITALIST
Payer: MEDICARE

## 2022-01-01 ENCOUNTER — APPOINTMENT (OUTPATIENT)
Dept: GENERAL RADIOLOGY | Facility: CLINIC | Age: 87
DRG: 064 | End: 2022-01-01
Attending: EMERGENCY MEDICINE
Payer: MEDICARE

## 2022-01-01 ENCOUNTER — APPOINTMENT (OUTPATIENT)
Dept: CT IMAGING | Facility: CLINIC | Age: 87
DRG: 064 | End: 2022-01-01
Attending: EMERGENCY MEDICINE
Payer: MEDICARE

## 2022-01-01 ENCOUNTER — ANCILLARY PROCEDURE (OUTPATIENT)
Dept: CARDIOLOGY | Facility: CLINIC | Age: 87
End: 2022-01-01
Attending: INTERNAL MEDICINE
Payer: MEDICARE

## 2022-01-01 ENCOUNTER — APPOINTMENT (OUTPATIENT)
Dept: SPEECH THERAPY | Facility: CLINIC | Age: 87
DRG: 064 | End: 2022-01-01
Payer: MEDICARE

## 2022-01-01 ENCOUNTER — TELEPHONE (OUTPATIENT)
Dept: CARDIOLOGY | Facility: CLINIC | Age: 87
End: 2022-01-01
Payer: MEDICARE

## 2022-01-01 VITALS
OXYGEN SATURATION: 99 % | HEIGHT: 71 IN | HEART RATE: 72 BPM | TEMPERATURE: 97.6 F | RESPIRATION RATE: 18 BRPM | BODY MASS INDEX: 23.8 KG/M2 | WEIGHT: 170 LBS | DIASTOLIC BLOOD PRESSURE: 64 MMHG | SYSTOLIC BLOOD PRESSURE: 124 MMHG

## 2022-01-01 DIAGNOSIS — R41.82 ALTERED MENTAL STATUS, UNSPECIFIED ALTERED MENTAL STATUS TYPE: ICD-10-CM

## 2022-01-01 DIAGNOSIS — I49.5 SICK SINUS SYNDROME (H): Primary | ICD-10-CM

## 2022-01-01 DIAGNOSIS — R06.02 SHORTNESS OF BREATH: ICD-10-CM

## 2022-01-01 DIAGNOSIS — I42.9 CARDIOMYOPATHY, UNSPECIFIED TYPE (H): ICD-10-CM

## 2022-01-01 DIAGNOSIS — I49.5 SICK SINUS SYNDROME (H): ICD-10-CM

## 2022-01-01 DIAGNOSIS — Z95.0 CARDIAC PACEMAKER IN SITU: ICD-10-CM

## 2022-01-01 DIAGNOSIS — E78.5 HYPERLIPIDEMIA LDL GOAL <70: ICD-10-CM

## 2022-01-01 DIAGNOSIS — I48.0 PAROXYSMAL ATRIAL FIBRILLATION (H): ICD-10-CM

## 2022-01-01 DIAGNOSIS — I77.810 ASCENDING AORTA DILATATION (H): ICD-10-CM

## 2022-01-01 DIAGNOSIS — I61.9 INTRAPARENCHYMAL HEMORRHAGE OF BRAIN (H): ICD-10-CM

## 2022-01-01 LAB
ALBUMIN SERPL BCG-MCNC: 3 G/DL (ref 3.5–5.2)
ALBUMIN SERPL BCG-MCNC: 3.5 G/DL (ref 3.5–5.2)
ALBUMIN UR-MCNC: 20 MG/DL
ALP SERPL-CCNC: 49 U/L (ref 40–129)
ALP SERPL-CCNC: 52 U/L (ref 40–129)
ALT SERPL W P-5'-P-CCNC: 14 U/L (ref 10–50)
ALT SERPL W P-5'-P-CCNC: 20 U/L (ref 10–50)
ANION GAP SERPL CALCULATED.3IONS-SCNC: 11 MMOL/L (ref 7–15)
ANION GAP SERPL CALCULATED.3IONS-SCNC: 12 MMOL/L (ref 7–15)
ANION GAP SERPL CALCULATED.3IONS-SCNC: 16 MMOL/L (ref 7–15)
APPEARANCE UR: CLEAR
AST SERPL W P-5'-P-CCNC: 21 U/L (ref 10–50)
AST SERPL W P-5'-P-CCNC: 45 U/L (ref 10–50)
ATRIAL RATE - MUSE: 61 BPM
BACTERIA BLD CULT: NO GROWTH
BACTERIA BLD CULT: NO GROWTH
BASOPHILS # BLD AUTO: 0 10E3/UL (ref 0–0.2)
BASOPHILS # BLD AUTO: 0 10E3/UL (ref 0–0.2)
BASOPHILS NFR BLD AUTO: 0 %
BASOPHILS NFR BLD AUTO: 0 %
BILIRUB SERPL-MCNC: 0.4 MG/DL
BILIRUB SERPL-MCNC: 0.6 MG/DL
BILIRUB UR QL STRIP: NEGATIVE
BUN SERPL-MCNC: 16.4 MG/DL (ref 8–23)
BUN SERPL-MCNC: 18.4 MG/DL (ref 8–23)
BUN SERPL-MCNC: 28.9 MG/DL (ref 8–23)
CALCIUM SERPL-MCNC: 8.8 MG/DL (ref 8.2–9.6)
CALCIUM SERPL-MCNC: 9 MG/DL (ref 8.2–9.6)
CALCIUM SERPL-MCNC: 9.5 MG/DL (ref 8.2–9.6)
CHLORIDE SERPL-SCNC: 101 MMOL/L (ref 98–107)
CHLORIDE SERPL-SCNC: 98 MMOL/L (ref 98–107)
CHLORIDE SERPL-SCNC: 99 MMOL/L (ref 98–107)
COLOR UR AUTO: ABNORMAL
CREAT SERPL-MCNC: 0.92 MG/DL (ref 0.67–1.17)
CREAT SERPL-MCNC: 1 MG/DL (ref 0.67–1.17)
CREAT SERPL-MCNC: 1.16 MG/DL (ref 0.67–1.17)
DEPRECATED HCO3 PLAS-SCNC: 18 MMOL/L (ref 22–29)
DEPRECATED HCO3 PLAS-SCNC: 22 MMOL/L (ref 22–29)
DEPRECATED HCO3 PLAS-SCNC: 24 MMOL/L (ref 22–29)
DIASTOLIC BLOOD PRESSURE - MUSE: NORMAL MMHG
EOSINOPHIL # BLD AUTO: 0 10E3/UL (ref 0–0.7)
EOSINOPHIL # BLD AUTO: 0.1 10E3/UL (ref 0–0.7)
EOSINOPHIL NFR BLD AUTO: 0 %
EOSINOPHIL NFR BLD AUTO: 1 %
ERYTHROCYTE [DISTWIDTH] IN BLOOD BY AUTOMATED COUNT: 12.7 % (ref 10–15)
ERYTHROCYTE [DISTWIDTH] IN BLOOD BY AUTOMATED COUNT: 12.8 % (ref 10–15)
ERYTHROCYTE [DISTWIDTH] IN BLOOD BY AUTOMATED COUNT: 12.8 % (ref 10–15)
FLUAV RNA SPEC QL NAA+PROBE: NEGATIVE
FLUBV RNA RESP QL NAA+PROBE: NEGATIVE
GFR SERPL CREATININE-BSD FRML MDRD: 59 ML/MIN/1.73M2
GFR SERPL CREATININE-BSD FRML MDRD: 71 ML/MIN/1.73M2
GFR SERPL CREATININE-BSD FRML MDRD: 79 ML/MIN/1.73M2
GLUCOSE BLDC GLUCOMTR-MCNC: 100 MG/DL (ref 70–99)
GLUCOSE BLDC GLUCOMTR-MCNC: 107 MG/DL (ref 70–99)
GLUCOSE BLDC GLUCOMTR-MCNC: 108 MG/DL (ref 70–99)
GLUCOSE BLDC GLUCOMTR-MCNC: 110 MG/DL (ref 70–99)
GLUCOSE BLDC GLUCOMTR-MCNC: 113 MG/DL (ref 70–99)
GLUCOSE BLDC GLUCOMTR-MCNC: 116 MG/DL (ref 70–99)
GLUCOSE BLDC GLUCOMTR-MCNC: 117 MG/DL (ref 70–99)
GLUCOSE BLDC GLUCOMTR-MCNC: 128 MG/DL (ref 70–99)
GLUCOSE BLDC GLUCOMTR-MCNC: 96 MG/DL (ref 70–99)
GLUCOSE BLDC GLUCOMTR-MCNC: 98 MG/DL (ref 70–99)
GLUCOSE SERPL-MCNC: 107 MG/DL (ref 70–99)
GLUCOSE SERPL-MCNC: 119 MG/DL (ref 70–99)
GLUCOSE SERPL-MCNC: 133 MG/DL (ref 70–99)
GLUCOSE UR STRIP-MCNC: NEGATIVE MG/DL
HCT VFR BLD AUTO: 36 % (ref 40–53)
HCT VFR BLD AUTO: 36.8 % (ref 40–53)
HCT VFR BLD AUTO: 36.9 % (ref 40–53)
HGB BLD-MCNC: 11.3 G/DL (ref 13.3–17.7)
HGB BLD-MCNC: 11.5 G/DL (ref 13.3–17.7)
HGB BLD-MCNC: 11.6 G/DL (ref 13.3–17.7)
HGB UR QL STRIP: ABNORMAL
HYALINE CASTS: 1 /LPF
IMM GRANULOCYTES # BLD: 0 10E3/UL
IMM GRANULOCYTES # BLD: 0.1 10E3/UL
IMM GRANULOCYTES NFR BLD: 0 %
IMM GRANULOCYTES NFR BLD: 1 %
INTERPRETATION ECG - MUSE: NORMAL
KETONES UR STRIP-MCNC: NEGATIVE MG/DL
LACTATE SERPL-SCNC: 1 MMOL/L (ref 0.7–2)
LEUKOCYTE ESTERASE UR QL STRIP: NEGATIVE
LVEF ECHO: NORMAL
LYMPHOCYTES # BLD AUTO: 1.2 10E3/UL (ref 0.8–5.3)
LYMPHOCYTES # BLD AUTO: 1.7 10E3/UL (ref 0.8–5.3)
LYMPHOCYTES NFR BLD AUTO: 10 %
LYMPHOCYTES NFR BLD AUTO: 19 %
MAGNESIUM SERPL-MCNC: 2.1 MG/DL (ref 1.7–2.3)
MCH RBC QN AUTO: 30.5 PG (ref 26.5–33)
MCH RBC QN AUTO: 30.6 PG (ref 26.5–33)
MCH RBC QN AUTO: 30.8 PG (ref 26.5–33)
MCHC RBC AUTO-ENTMCNC: 31.2 G/DL (ref 31.5–36.5)
MCHC RBC AUTO-ENTMCNC: 31.4 G/DL (ref 31.5–36.5)
MCHC RBC AUTO-ENTMCNC: 31.5 G/DL (ref 31.5–36.5)
MCV RBC AUTO: 97 FL (ref 78–100)
MCV RBC AUTO: 98 FL (ref 78–100)
MCV RBC AUTO: 98 FL (ref 78–100)
MDC_IDC_LEAD_IMPLANT_DT: NORMAL
MDC_IDC_LEAD_LOCATION: NORMAL
MDC_IDC_LEAD_MFG: NORMAL
MDC_IDC_LEAD_MODEL: NORMAL
MDC_IDC_LEAD_POLARITY_TYPE: NORMAL
MDC_IDC_LEAD_SERIAL: NORMAL
MDC_IDC_MSMT_BATTERY_DTM: NORMAL
MDC_IDC_MSMT_BATTERY_IMPEDANCE: 335 OHM
MDC_IDC_MSMT_BATTERY_IMPEDANCE: 435 OHM
MDC_IDC_MSMT_BATTERY_IMPEDANCE: 511 OHM
MDC_IDC_MSMT_BATTERY_REMAINING_LONGEVITY: 103 MO
MDC_IDC_MSMT_BATTERY_REMAINING_LONGEVITY: 114 MO
MDC_IDC_MSMT_BATTERY_REMAINING_LONGEVITY: 97 MO
MDC_IDC_MSMT_BATTERY_STATUS: NORMAL
MDC_IDC_MSMT_BATTERY_VOLTAGE: 2.78 V
MDC_IDC_MSMT_LEADCHNL_RA_IMPEDANCE_VALUE: 371 OHM
MDC_IDC_MSMT_LEADCHNL_RA_IMPEDANCE_VALUE: 391 OHM
MDC_IDC_MSMT_LEADCHNL_RA_IMPEDANCE_VALUE: 407 OHM
MDC_IDC_MSMT_LEADCHNL_RA_PACING_THRESHOLD_AMPLITUDE: 0.5 V
MDC_IDC_MSMT_LEADCHNL_RA_PACING_THRESHOLD_AMPLITUDE: 0.75 V
MDC_IDC_MSMT_LEADCHNL_RA_PACING_THRESHOLD_PULSEWIDTH: 0.4 MS
MDC_IDC_MSMT_LEADCHNL_RA_SENSING_INTR_AMPL: 4 MV
MDC_IDC_MSMT_LEADCHNL_RV_IMPEDANCE_VALUE: 382 OHM
MDC_IDC_MSMT_LEADCHNL_RV_IMPEDANCE_VALUE: 396 OHM
MDC_IDC_MSMT_LEADCHNL_RV_IMPEDANCE_VALUE: 396 OHM
MDC_IDC_MSMT_LEADCHNL_RV_PACING_THRESHOLD_AMPLITUDE: 1 V
MDC_IDC_MSMT_LEADCHNL_RV_PACING_THRESHOLD_PULSEWIDTH: 0.4 MS
MDC_IDC_MSMT_LEADCHNL_RV_SENSING_INTR_AMPL: 22.4 MV
MDC_IDC_PG_IMPLANT_DTM: NORMAL
MDC_IDC_PG_MFG: NORMAL
MDC_IDC_PG_MODEL: NORMAL
MDC_IDC_PG_SERIAL: NORMAL
MDC_IDC_PG_TYPE: NORMAL
MDC_IDC_SESS_CLINIC_NAME: NORMAL
MDC_IDC_SESS_DTM: NORMAL
MDC_IDC_SESS_TYPE: NORMAL
MDC_IDC_SET_BRADY_AT_MODE_SWITCH_MODE: NORMAL
MDC_IDC_SET_BRADY_AT_MODE_SWITCH_RATE: 175 {BEATS}/MIN
MDC_IDC_SET_BRADY_LOWRATE: 60 {BEATS}/MIN
MDC_IDC_SET_BRADY_MAX_SENSOR_RATE: 130 {BEATS}/MIN
MDC_IDC_SET_BRADY_MAX_TRACKING_RATE: 130 {BEATS}/MIN
MDC_IDC_SET_BRADY_MODE: NORMAL
MDC_IDC_SET_BRADY_PAV_DELAY_LOW: 150 MS
MDC_IDC_SET_BRADY_SAV_DELAY_LOW: 120 MS
MDC_IDC_SET_LEADCHNL_RA_PACING_AMPLITUDE: 1.5 V
MDC_IDC_SET_LEADCHNL_RA_PACING_CAPTURE_MODE: NORMAL
MDC_IDC_SET_LEADCHNL_RA_PACING_POLARITY: NORMAL
MDC_IDC_SET_LEADCHNL_RA_PACING_PULSEWIDTH: 0.4 MS
MDC_IDC_SET_LEADCHNL_RA_SENSING_POLARITY: NORMAL
MDC_IDC_SET_LEADCHNL_RA_SENSING_SENSITIVITY: 0.35 MV
MDC_IDC_SET_LEADCHNL_RA_SENSING_SENSITIVITY: 0.35 MV
MDC_IDC_SET_LEADCHNL_RA_SENSING_SENSITIVITY: 0.5 MV
MDC_IDC_SET_LEADCHNL_RV_PACING_AMPLITUDE: 2 V
MDC_IDC_SET_LEADCHNL_RV_PACING_CAPTURE_MODE: NORMAL
MDC_IDC_SET_LEADCHNL_RV_PACING_POLARITY: NORMAL
MDC_IDC_SET_LEADCHNL_RV_PACING_PULSEWIDTH: 0.4 MS
MDC_IDC_SET_LEADCHNL_RV_SENSING_POLARITY: NORMAL
MDC_IDC_SET_LEADCHNL_RV_SENSING_SENSITIVITY: 5.6 MV
MDC_IDC_SET_ZONE_DETECTION_INTERVAL: 333.33 MS
MDC_IDC_SET_ZONE_DETECTION_INTERVAL: 342.86 MS
MDC_IDC_SET_ZONE_TYPE: NORMAL
MDC_IDC_STAT_AT_BURDEN_PERCENT: 0.1 %
MDC_IDC_STAT_AT_BURDEN_PERCENT: 0.2 %
MDC_IDC_STAT_AT_BURDEN_PERCENT: 0.2 %
MDC_IDC_STAT_AT_DTM_END: NORMAL
MDC_IDC_STAT_AT_DTM_START: NORMAL
MDC_IDC_STAT_AT_MODE_SW_COUNT: 128
MDC_IDC_STAT_AT_MODE_SW_COUNT: 130
MDC_IDC_STAT_AT_MODE_SW_COUNT: 69
MDC_IDC_STAT_BRADY_AP_VP_PERCENT: 0 %
MDC_IDC_STAT_BRADY_AP_VS_PERCENT: 90 %
MDC_IDC_STAT_BRADY_AP_VS_PERCENT: 91 %
MDC_IDC_STAT_BRADY_AP_VS_PERCENT: 96 %
MDC_IDC_STAT_BRADY_AS_VP_PERCENT: 0 %
MDC_IDC_STAT_BRADY_AS_VS_PERCENT: 10 %
MDC_IDC_STAT_BRADY_AS_VS_PERCENT: 4 %
MDC_IDC_STAT_BRADY_AS_VS_PERCENT: 8 %
MDC_IDC_STAT_BRADY_DTM_END: NORMAL
MDC_IDC_STAT_BRADY_DTM_START: NORMAL
MDC_IDC_STAT_EPISODE_RECENT_COUNT: 0
MDC_IDC_STAT_EPISODE_RECENT_COUNT: 0
MDC_IDC_STAT_EPISODE_RECENT_COUNT: 1
MDC_IDC_STAT_EPISODE_RECENT_COUNT: 25
MDC_IDC_STAT_EPISODE_RECENT_COUNT: 43
MDC_IDC_STAT_EPISODE_RECENT_COUNT: 44
MDC_IDC_STAT_EPISODE_RECENT_COUNT_DTM_END: NORMAL
MDC_IDC_STAT_EPISODE_RECENT_COUNT_DTM_START: NORMAL
MDC_IDC_STAT_EPISODE_TYPE: NORMAL
MONOCYTES # BLD AUTO: 0.9 10E3/UL (ref 0–1.3)
MONOCYTES # BLD AUTO: 1.2 10E3/UL (ref 0–1.3)
MONOCYTES NFR BLD AUTO: 10 %
MONOCYTES NFR BLD AUTO: 10 %
MUCOUS THREADS #/AREA URNS LPF: PRESENT /LPF
NEUTROPHILS # BLD AUTO: 6.4 10E3/UL (ref 1.6–8.3)
NEUTROPHILS # BLD AUTO: 9.8 10E3/UL (ref 1.6–8.3)
NEUTROPHILS NFR BLD AUTO: 70 %
NEUTROPHILS NFR BLD AUTO: 79 %
NITRATE UR QL: NEGATIVE
NRBC # BLD AUTO: 0 10E3/UL
NRBC # BLD AUTO: 0 10E3/UL
NRBC BLD AUTO-RTO: 0 /100
NRBC BLD AUTO-RTO: 0 /100
P AXIS - MUSE: NORMAL DEGREES
PH UR STRIP: 6.5 [PH] (ref 5–7)
PLATELET # BLD AUTO: 253 10E3/UL (ref 150–450)
PLATELET # BLD AUTO: 262 10E3/UL (ref 150–450)
PLATELET # BLD AUTO: 296 10E3/UL (ref 150–450)
POTASSIUM SERPL-SCNC: 4.1 MMOL/L (ref 3.4–5.3)
POTASSIUM SERPL-SCNC: 4.2 MMOL/L (ref 3.4–5.3)
POTASSIUM SERPL-SCNC: 4.6 MMOL/L (ref 3.4–5.3)
PR INTERVAL - MUSE: 300 MS
PROCALCITONIN SERPL IA-MCNC: 0.33 NG/ML
PROT SERPL-MCNC: 6.1 G/DL (ref 6.4–8.3)
PROT SERPL-MCNC: 6.8 G/DL (ref 6.4–8.3)
QRS DURATION - MUSE: 136 MS
QT - MUSE: 488 MS
QTC - MUSE: 495 MS
R AXIS - MUSE: -2 DEGREES
RADIOLOGIST FLAGS: ABNORMAL
RBC # BLD AUTO: 3.67 10E6/UL (ref 4.4–5.9)
RBC # BLD AUTO: 3.77 10E6/UL (ref 4.4–5.9)
RBC # BLD AUTO: 3.79 10E6/UL (ref 4.4–5.9)
RBC URINE: 2 /HPF
RSV RNA SPEC NAA+PROBE: NEGATIVE
SARS-COV-2 RNA RESP QL NAA+PROBE: NEGATIVE
SODIUM SERPL-SCNC: 132 MMOL/L (ref 136–145)
SODIUM SERPL-SCNC: 134 MMOL/L (ref 136–145)
SODIUM SERPL-SCNC: 135 MMOL/L (ref 136–145)
SP GR UR STRIP: 1.02 (ref 1–1.03)
SYSTOLIC BLOOD PRESSURE - MUSE: NORMAL MMHG
T AXIS - MUSE: -33 DEGREES
TROPONIN T SERPL HS-MCNC: 60 NG/L
TSH SERPL DL<=0.005 MIU/L-ACNC: 0.77 UIU/ML (ref 0.3–4.2)
UROBILINOGEN UR STRIP-MCNC: NORMAL MG/DL
VENTRICULAR RATE- MUSE: 62 BPM
WBC # BLD AUTO: 12.3 10E3/UL (ref 4–11)
WBC # BLD AUTO: 8.5 10E3/UL (ref 4–11)
WBC # BLD AUTO: 9.2 10E3/UL (ref 4–11)
WBC URINE: 1 /HPF

## 2022-01-01 PROCEDURE — 85025 COMPLETE CBC W/AUTO DIFF WBC: CPT | Performed by: INTERNAL MEDICINE

## 2022-01-01 PROCEDURE — 99233 SBSQ HOSP IP/OBS HIGH 50: CPT | Performed by: INTERNAL MEDICINE

## 2022-01-01 PROCEDURE — 250N000009 HC RX 250: Performed by: NURSE PRACTITIONER

## 2022-01-01 PROCEDURE — 93296 REM INTERROG EVL PM/IDS: CPT | Performed by: INTERNAL MEDICINE

## 2022-01-01 PROCEDURE — 250N000009 HC RX 250: Performed by: INTERNAL MEDICINE

## 2022-01-01 PROCEDURE — 250N000013 HC RX MED GY IP 250 OP 250 PS 637: Performed by: NURSE PRACTITIONER

## 2022-01-01 PROCEDURE — 84484 ASSAY OF TROPONIN QUANT: CPT | Performed by: EMERGENCY MEDICINE

## 2022-01-01 PROCEDURE — 70450 CT HEAD/BRAIN W/O DYE: CPT | Mod: MA

## 2022-01-01 PROCEDURE — 81001 URINALYSIS AUTO W/SCOPE: CPT | Performed by: EMERGENCY MEDICINE

## 2022-01-01 PROCEDURE — 250N000011 HC RX IP 250 OP 636: Performed by: NURSE PRACTITIONER

## 2022-01-01 PROCEDURE — 70450 CT HEAD/BRAIN W/O DYE: CPT | Mod: MA,76

## 2022-01-01 PROCEDURE — 71046 X-RAY EXAM CHEST 2 VIEWS: CPT

## 2022-01-01 PROCEDURE — 120N000001 HC R&B MED SURG/OB

## 2022-01-01 PROCEDURE — 99207 PR NO CHARGE LOS: CPT | Performed by: INTERNAL MEDICINE

## 2022-01-01 PROCEDURE — 258N000003 HC RX IP 258 OP 636: Performed by: PHYSICIAN ASSISTANT

## 2022-01-01 PROCEDURE — 83605 ASSAY OF LACTIC ACID: CPT | Performed by: EMERGENCY MEDICINE

## 2022-01-01 PROCEDURE — 99285 EMERGENCY DEPT VISIT HI MDM: CPT | Mod: 25

## 2022-01-01 PROCEDURE — 99207 PR NO BILLABLE SERVICE THIS VISIT: CPT | Performed by: STUDENT IN AN ORGANIZED HEALTH CARE EDUCATION/TRAINING PROGRAM

## 2022-01-01 PROCEDURE — 93280 PM DEVICE PROGR EVAL DUAL: CPT | Performed by: INTERNAL MEDICINE

## 2022-01-01 PROCEDURE — 99232 SBSQ HOSP IP/OBS MODERATE 35: CPT | Performed by: NURSE PRACTITIONER

## 2022-01-01 PROCEDURE — 999N000197 HC STATISTIC WOC PT EDUCATION, 0-15 MIN

## 2022-01-01 PROCEDURE — 250N000011 HC RX IP 250 OP 636: Performed by: EMERGENCY MEDICINE

## 2022-01-01 PROCEDURE — 93294 REM INTERROG EVL PM/LDLS PM: CPT | Performed by: INTERNAL MEDICINE

## 2022-01-01 PROCEDURE — 99223 1ST HOSP IP/OBS HIGH 75: CPT | Performed by: NURSE PRACTITIONER

## 2022-01-01 PROCEDURE — 99231 SBSQ HOSP IP/OBS SF/LOW 25: CPT | Performed by: INTERNAL MEDICINE

## 2022-01-01 PROCEDURE — 250N000013 HC RX MED GY IP 250 OP 250 PS 637: Performed by: PHYSICIAN ASSISTANT

## 2022-01-01 PROCEDURE — 999N000009 HC STATISTIC AIRWAY CARE

## 2022-01-01 PROCEDURE — 93306 TTE W/DOPPLER COMPLETE: CPT | Mod: 26 | Performed by: INTERNAL MEDICINE

## 2022-01-01 PROCEDURE — 99238 HOSP IP/OBS DSCHRG MGMT 30/<: CPT | Performed by: INTERNAL MEDICINE

## 2022-01-01 PROCEDURE — 96361 HYDRATE IV INFUSION ADD-ON: CPT

## 2022-01-01 PROCEDURE — 80048 BASIC METABOLIC PNL TOTAL CA: CPT | Performed by: PHYSICIAN ASSISTANT

## 2022-01-01 PROCEDURE — 99356 PR PROLONGED SERV,INPATIENT,1ST HR: CPT | Performed by: NURSE PRACTITIONER

## 2022-01-01 PROCEDURE — 85025 COMPLETE CBC W/AUTO DIFF WBC: CPT | Performed by: EMERGENCY MEDICINE

## 2022-01-01 PROCEDURE — 250N000011 HC RX IP 250 OP 636: Performed by: PHYSICIAN ASSISTANT

## 2022-01-01 PROCEDURE — 83735 ASSAY OF MAGNESIUM: CPT | Performed by: EMERGENCY MEDICINE

## 2022-01-01 PROCEDURE — 250N000011 HC RX IP 250 OP 636: Performed by: INTERNAL MEDICINE

## 2022-01-01 PROCEDURE — 250N000011 HC RX IP 250 OP 636: Performed by: HOSPITALIST

## 2022-01-01 PROCEDURE — G0463 HOSPITAL OUTPT CLINIC VISIT: HCPCS

## 2022-01-01 PROCEDURE — 258N000003 HC RX IP 258 OP 636: Performed by: EMERGENCY MEDICINE

## 2022-01-01 PROCEDURE — 99233 SBSQ HOSP IP/OBS HIGH 50: CPT | Performed by: NURSE PRACTITIONER

## 2022-01-01 PROCEDURE — 99232 SBSQ HOSP IP/OBS MODERATE 35: CPT | Performed by: INTERNAL MEDICINE

## 2022-01-01 PROCEDURE — C9803 HOPD COVID-19 SPEC COLLECT: HCPCS

## 2022-01-01 PROCEDURE — 99221 1ST HOSP IP/OBS SF/LOW 40: CPT | Performed by: NURSE PRACTITIONER

## 2022-01-01 PROCEDURE — 999N000157 HC STATISTIC RCP TIME EA 10 MIN

## 2022-01-01 PROCEDURE — 36415 COLL VENOUS BLD VENIPUNCTURE: CPT | Performed by: EMERGENCY MEDICINE

## 2022-01-01 PROCEDURE — 84443 ASSAY THYROID STIM HORMONE: CPT | Performed by: EMERGENCY MEDICINE

## 2022-01-01 PROCEDURE — 84145 PROCALCITONIN (PCT): CPT | Performed by: INTERNAL MEDICINE

## 2022-01-01 PROCEDURE — 71045 X-RAY EXAM CHEST 1 VIEW: CPT

## 2022-01-01 PROCEDURE — 93306 TTE W/DOPPLER COMPLETE: CPT

## 2022-01-01 PROCEDURE — 36415 COLL VENOUS BLD VENIPUNCTURE: CPT | Performed by: PHYSICIAN ASSISTANT

## 2022-01-01 PROCEDURE — 93005 ELECTROCARDIOGRAM TRACING: CPT

## 2022-01-01 PROCEDURE — 36415 COLL VENOUS BLD VENIPUNCTURE: CPT | Performed by: INTERNAL MEDICINE

## 2022-01-01 PROCEDURE — 87637 SARSCOV2&INF A&B&RSV AMP PRB: CPT | Performed by: EMERGENCY MEDICINE

## 2022-01-01 PROCEDURE — 96360 HYDRATION IV INFUSION INIT: CPT

## 2022-01-01 PROCEDURE — 85027 COMPLETE CBC AUTOMATED: CPT | Performed by: PHYSICIAN ASSISTANT

## 2022-01-01 PROCEDURE — 80053 COMPREHEN METABOLIC PANEL: CPT | Performed by: INTERNAL MEDICINE

## 2022-01-01 PROCEDURE — G1010 CDSM STANSON: HCPCS

## 2022-01-01 PROCEDURE — 87040 BLOOD CULTURE FOR BACTERIA: CPT | Performed by: INTERNAL MEDICINE

## 2022-01-01 PROCEDURE — 82435 ASSAY OF BLOOD CHLORIDE: CPT | Performed by: EMERGENCY MEDICINE

## 2022-01-01 PROCEDURE — 92610 EVALUATE SWALLOWING FUNCTION: CPT | Mod: GN

## 2022-01-01 RX ORDER — ATROPINE SULFATE 10 MG/ML
2-4 SOLUTION/ DROPS OPHTHALMIC
Status: DISCONTINUED | OUTPATIENT
Start: 2022-01-01 | End: 2022-01-01 | Stop reason: HOSPADM

## 2022-01-01 RX ORDER — MINERAL OIL/HYDROPHIL PETROLAT
OINTMENT (GRAM) TOPICAL EVERY 4 HOURS
Status: DISCONTINUED | OUTPATIENT
Start: 2022-01-01 | End: 2022-01-01 | Stop reason: HOSPADM

## 2022-01-01 RX ORDER — MORPHINE SULFATE 10 MG/5ML
10 SOLUTION ORAL
Status: DISCONTINUED | OUTPATIENT
Start: 2022-01-01 | End: 2022-01-01

## 2022-01-01 RX ORDER — MORPHINE SULFATE 2 MG/ML
2 INJECTION, SOLUTION INTRAMUSCULAR; INTRAVENOUS
Status: DISCONTINUED | OUTPATIENT
Start: 2022-01-01 | End: 2022-01-01

## 2022-01-01 RX ORDER — HALOPERIDOL 0.5 MG/1
.5-1 TABLET ORAL EVERY 4 HOURS PRN
Status: DISCONTINUED | OUTPATIENT
Start: 2022-01-01 | End: 2022-01-01

## 2022-01-01 RX ORDER — METOPROLOL SUCCINATE 25 MG/1
25 TABLET, EXTENDED RELEASE ORAL 2 TIMES DAILY
Status: DISCONTINUED | OUTPATIENT
Start: 2022-01-01 | End: 2022-01-01

## 2022-01-01 RX ORDER — PROCHLORPERAZINE MALEATE 5 MG
5 TABLET ORAL EVERY 6 HOURS PRN
Status: DISCONTINUED | OUTPATIENT
Start: 2022-01-01 | End: 2022-01-01

## 2022-01-01 RX ORDER — MORPHINE SULFATE 20 MG/ML
10 SOLUTION ORAL EVERY 4 HOURS
Status: DISCONTINUED | OUTPATIENT
Start: 2022-01-01 | End: 2022-01-01 | Stop reason: HOSPADM

## 2022-01-01 RX ORDER — HYDRALAZINE HYDROCHLORIDE 20 MG/ML
10 INJECTION INTRAMUSCULAR; INTRAVENOUS EVERY 4 HOURS PRN
Status: DISCONTINUED | OUTPATIENT
Start: 2022-01-01 | End: 2022-01-01

## 2022-01-01 RX ORDER — MORPHINE SULFATE 2 MG/ML
1 INJECTION, SOLUTION INTRAMUSCULAR; INTRAVENOUS
Status: DISCONTINUED | OUTPATIENT
Start: 2022-01-01 | End: 2022-01-01

## 2022-01-01 RX ORDER — METOPROLOL TARTRATE 1 MG/ML
5 INJECTION, SOLUTION INTRAVENOUS EVERY 6 HOURS
Status: DISCONTINUED | OUTPATIENT
Start: 2022-01-01 | End: 2022-01-01

## 2022-01-01 RX ORDER — LORAZEPAM 2 MG/ML
1-2 CONCENTRATE ORAL
Status: DISCONTINUED | OUTPATIENT
Start: 2022-01-01 | End: 2022-01-01 | Stop reason: HOSPADM

## 2022-01-01 RX ORDER — DEXTROSE MONOHYDRATE, SODIUM CHLORIDE, AND POTASSIUM CHLORIDE 50; 1.49; 4.5 G/1000ML; G/1000ML; G/1000ML
INJECTION, SOLUTION INTRAVENOUS CONTINUOUS
Status: DISCONTINUED | OUTPATIENT
Start: 2022-01-01 | End: 2022-01-01

## 2022-01-01 RX ORDER — NALOXONE HYDROCHLORIDE 0.4 MG/ML
0.4 INJECTION, SOLUTION INTRAMUSCULAR; INTRAVENOUS; SUBCUTANEOUS
Status: DISCONTINUED | OUTPATIENT
Start: 2022-01-01 | End: 2022-01-01 | Stop reason: ALTCHOICE

## 2022-01-01 RX ORDER — OLANZAPINE 5 MG/1
5 TABLET, ORALLY DISINTEGRATING ORAL AT BEDTIME
Status: DISCONTINUED | OUTPATIENT
Start: 2022-01-01 | End: 2022-01-01 | Stop reason: HOSPADM

## 2022-01-01 RX ORDER — MORPHINE SULFATE 10 MG/5ML
5 SOLUTION ORAL
Status: DISCONTINUED | OUTPATIENT
Start: 2022-01-01 | End: 2022-01-01

## 2022-01-01 RX ORDER — ATROPINE SULFATE 10 MG/ML
2-4 SOLUTION/ DROPS OPHTHALMIC EVERY 4 HOURS
Status: DISCONTINUED | OUTPATIENT
Start: 2022-01-01 | End: 2022-01-01 | Stop reason: HOSPADM

## 2022-01-01 RX ORDER — LORAZEPAM 2 MG/ML
1-2 INJECTION INTRAMUSCULAR
Status: DISCONTINUED | OUTPATIENT
Start: 2022-01-01 | End: 2022-01-01

## 2022-01-01 RX ORDER — METOPROLOL TARTRATE 1 MG/ML
2.5 INJECTION, SOLUTION INTRAVENOUS EVERY 4 HOURS PRN
Status: DISCONTINUED | OUTPATIENT
Start: 2022-01-01 | End: 2022-01-01

## 2022-01-01 RX ORDER — CARBOXYMETHYLCELLULOSE SODIUM 5 MG/ML
1-2 SOLUTION/ DROPS OPHTHALMIC
Status: DISCONTINUED | OUTPATIENT
Start: 2022-01-01 | End: 2022-01-01

## 2022-01-01 RX ORDER — ACETAMINOPHEN 325 MG/1
650 TABLET ORAL EVERY 6 HOURS PRN
Status: DISCONTINUED | OUTPATIENT
Start: 2022-01-01 | End: 2022-01-01

## 2022-01-01 RX ORDER — LIDOCAINE 40 MG/G
CREAM TOPICAL
Status: DISCONTINUED | OUTPATIENT
Start: 2022-01-01 | End: 2022-01-01

## 2022-01-01 RX ORDER — MORPHINE SULFATE 20 MG/ML
10 SOLUTION ORAL
Status: DISCONTINUED | OUTPATIENT
Start: 2022-01-01 | End: 2022-01-01

## 2022-01-01 RX ORDER — SALIVA STIMULANT COMB. NO.3
2 SPRAY, NON-AEROSOL (ML) MUCOUS MEMBRANE EVERY 4 HOURS
Status: DISCONTINUED | OUTPATIENT
Start: 2022-01-01 | End: 2022-01-01 | Stop reason: HOSPADM

## 2022-01-01 RX ORDER — DEXTROSE MONOHYDRATE 25 G/50ML
25-50 INJECTION, SOLUTION INTRAVENOUS
Status: DISCONTINUED | OUTPATIENT
Start: 2022-01-01 | End: 2022-01-01

## 2022-01-01 RX ORDER — HYDROMORPHONE HYDROCHLORIDE 1 MG/ML
0.3 INJECTION, SOLUTION INTRAMUSCULAR; INTRAVENOUS; SUBCUTANEOUS EVERY 4 HOURS PRN
Status: DISCONTINUED | OUTPATIENT
Start: 2022-01-01 | End: 2022-01-01

## 2022-01-01 RX ORDER — ENALAPRILAT 1.25 MG/ML
1.25 INJECTION INTRAVENOUS EVERY 6 HOURS
Status: DISCONTINUED | OUTPATIENT
Start: 2022-01-01 | End: 2022-01-01

## 2022-01-01 RX ORDER — LORAZEPAM 2 MG/ML
.5-1 INJECTION INTRAMUSCULAR EVERY 4 HOURS PRN
Status: DISCONTINUED | OUTPATIENT
Start: 2022-01-01 | End: 2022-01-01

## 2022-01-01 RX ORDER — SALIVA STIMULANT COMB. NO.3
1 SPRAY, NON-AEROSOL (ML) MUCOUS MEMBRANE
Status: DISCONTINUED | OUTPATIENT
Start: 2022-01-01 | End: 2022-01-01

## 2022-01-01 RX ORDER — MORPHINE SULFATE 15 MG/1
7.5 TABLET ORAL EVERY 4 HOURS
Status: DISCONTINUED | OUTPATIENT
Start: 2022-01-01 | End: 2022-01-01

## 2022-01-01 RX ORDER — CARBOXYMETHYLCELLULOSE SODIUM 5 MG/ML
2 SOLUTION/ DROPS OPHTHALMIC EVERY 4 HOURS
Status: DISCONTINUED | OUTPATIENT
Start: 2022-01-01 | End: 2022-01-01 | Stop reason: HOSPADM

## 2022-01-01 RX ORDER — NALOXONE HYDROCHLORIDE 0.4 MG/ML
0.2 INJECTION, SOLUTION INTRAMUSCULAR; INTRAVENOUS; SUBCUTANEOUS
Status: DISCONTINUED | OUTPATIENT
Start: 2022-01-01 | End: 2022-01-01

## 2022-01-01 RX ORDER — MORPHINE SULFATE 15 MG/1
7.5 TABLET ORAL
Status: DISCONTINUED | OUTPATIENT
Start: 2022-01-01 | End: 2022-01-01

## 2022-01-01 RX ORDER — NALOXONE HYDROCHLORIDE 0.4 MG/ML
0.1 INJECTION, SOLUTION INTRAMUSCULAR; INTRAVENOUS; SUBCUTANEOUS
Status: DISCONTINUED | OUTPATIENT
Start: 2022-01-01 | End: 2022-01-01

## 2022-01-01 RX ORDER — NALOXONE HYDROCHLORIDE 0.4 MG/ML
0.2 INJECTION, SOLUTION INTRAMUSCULAR; INTRAVENOUS; SUBCUTANEOUS
Status: DISCONTINUED | OUTPATIENT
Start: 2022-01-01 | End: 2022-01-01 | Stop reason: ALTCHOICE

## 2022-01-01 RX ORDER — METOPROLOL SUCCINATE 25 MG/1
25 TABLET, EXTENDED RELEASE ORAL 2 TIMES DAILY
Qty: 180 TABLET | Refills: 0 | Status: SHIPPED | OUTPATIENT
Start: 2022-01-01

## 2022-01-01 RX ORDER — HALOPERIDOL 5 MG/ML
5 INJECTION INTRAMUSCULAR ONCE
Status: COMPLETED | OUTPATIENT
Start: 2022-01-01 | End: 2022-01-01

## 2022-01-01 RX ORDER — SIMVASTATIN 20 MG
20 TABLET ORAL AT BEDTIME
Qty: 90 TABLET | Refills: 0 | Status: SHIPPED | OUTPATIENT
Start: 2022-01-01

## 2022-01-01 RX ORDER — ONDANSETRON 4 MG/1
4 TABLET, ORALLY DISINTEGRATING ORAL EVERY 6 HOURS PRN
Status: DISCONTINUED | OUTPATIENT
Start: 2022-01-01 | End: 2022-01-01

## 2022-01-01 RX ORDER — NICOTINE POLACRILEX 4 MG
15-30 LOZENGE BUCCAL
Status: DISCONTINUED | OUTPATIENT
Start: 2022-01-01 | End: 2022-01-01

## 2022-01-01 RX ORDER — MORPHINE SULFATE 20 MG/ML
8 SOLUTION ORAL EVERY 4 HOURS
Status: DISCONTINUED | OUTPATIENT
Start: 2022-01-01 | End: 2022-01-01

## 2022-01-01 RX ORDER — MINERAL OIL/HYDROPHIL PETROLAT
OINTMENT (GRAM) TOPICAL
Status: DISCONTINUED | OUTPATIENT
Start: 2022-01-01 | End: 2022-01-01

## 2022-01-01 RX ORDER — FUROSEMIDE 20 MG
20 TABLET ORAL DAILY
Qty: 90 TABLET | Refills: 1 | Status: SHIPPED | OUTPATIENT
Start: 2022-01-01

## 2022-01-01 RX ORDER — SODIUM CHLORIDE AND POTASSIUM CHLORIDE 150; 450 MG/100ML; MG/100ML
INJECTION, SOLUTION INTRAVENOUS CONTINUOUS
Status: DISCONTINUED | OUTPATIENT
Start: 2022-01-01 | End: 2022-01-01

## 2022-01-01 RX ORDER — PROCHLORPERAZINE 25 MG
12.5 SUPPOSITORY, RECTAL RECTAL EVERY 12 HOURS PRN
Status: DISCONTINUED | OUTPATIENT
Start: 2022-01-01 | End: 2022-01-01 | Stop reason: HOSPADM

## 2022-01-01 RX ORDER — MORPHINE SULFATE 20 MG/ML
5 SOLUTION ORAL
Status: DISCONTINUED | OUTPATIENT
Start: 2022-01-01 | End: 2022-01-01

## 2022-01-01 RX ORDER — SCOLOPAMINE TRANSDERMAL SYSTEM 1 MG/1
1 PATCH, EXTENDED RELEASE TRANSDERMAL
Status: DISCONTINUED | OUTPATIENT
Start: 2022-01-01 | End: 2022-01-01 | Stop reason: HOSPADM

## 2022-01-01 RX ORDER — ONDANSETRON 2 MG/ML
4 INJECTION INTRAMUSCULAR; INTRAVENOUS EVERY 6 HOURS PRN
Status: DISCONTINUED | OUTPATIENT
Start: 2022-01-01 | End: 2022-01-01

## 2022-01-01 RX ORDER — MORPHINE SULFATE 20 MG/ML
10 SOLUTION ORAL
Status: DISCONTINUED | OUTPATIENT
Start: 2022-01-01 | End: 2022-01-01 | Stop reason: HOSPADM

## 2022-01-01 RX ORDER — ATROPINE SULFATE 10 MG/ML
2 SOLUTION/ DROPS OPHTHALMIC EVERY 4 HOURS PRN
Status: DISCONTINUED | OUTPATIENT
Start: 2022-01-01 | End: 2022-01-01

## 2022-01-01 RX ORDER — LORAZEPAM 2 MG/ML
0.5 INJECTION INTRAMUSCULAR ONCE
Status: COMPLETED | OUTPATIENT
Start: 2022-01-01 | End: 2022-01-01

## 2022-01-01 RX ORDER — GLYCOPYRROLATE 0.2 MG/ML
0.2 INJECTION, SOLUTION INTRAMUSCULAR; INTRAVENOUS EVERY 4 HOURS
Status: DISCONTINUED | OUTPATIENT
Start: 2022-01-01 | End: 2022-01-01

## 2022-01-01 RX ORDER — HALOPERIDOL 5 MG/ML
2 INJECTION INTRAMUSCULAR EVERY 6 HOURS PRN
Status: DISCONTINUED | OUTPATIENT
Start: 2022-01-01 | End: 2022-01-01

## 2022-01-01 RX ADMIN — Medication 7.5 MG: at 05:23

## 2022-01-01 RX ADMIN — GLYCOPYRROLATE 0.2 MG: 0.2 INJECTION, SOLUTION INTRAMUSCULAR; INTRAVENOUS at 18:47

## 2022-01-01 RX ADMIN — ATROPINE SULFATE 2 DROP: 10 SOLUTION/ DROPS OPHTHALMIC at 12:18

## 2022-01-01 RX ADMIN — WHITE PETROLATUM: 1.75 OINTMENT TOPICAL at 16:32

## 2022-01-01 RX ADMIN — MORPHINE SULFATE 10 MG: 10 SOLUTION ORAL at 22:14

## 2022-01-01 RX ADMIN — MORPHINE SULFATE 10 MG: 20 SOLUTION ORAL at 00:11

## 2022-01-01 RX ADMIN — Medication 7.5 MG: at 18:11

## 2022-01-01 RX ADMIN — GLYCOPYRROLATE 0.2 MG: 0.2 INJECTION, SOLUTION INTRAMUSCULAR; INTRAVENOUS at 14:33

## 2022-01-01 RX ADMIN — GLYCOPYRROLATE 0.2 MG: 0.2 INJECTION, SOLUTION INTRAMUSCULAR; INTRAVENOUS at 18:49

## 2022-01-01 RX ADMIN — Medication 2 DROP: at 00:11

## 2022-01-01 RX ADMIN — Medication 7.5 MG: at 09:59

## 2022-01-01 RX ADMIN — Medication 2 DROP: at 21:26

## 2022-01-01 RX ADMIN — LORAZEPAM 2 MG: 2 INJECTION INTRAMUSCULAR; INTRAVENOUS at 18:55

## 2022-01-01 RX ADMIN — GLYCOPYRROLATE 0.2 MG: 0.2 INJECTION, SOLUTION INTRAMUSCULAR; INTRAVENOUS at 17:29

## 2022-01-01 RX ADMIN — Medication 2 DROP: at 12:17

## 2022-01-01 RX ADMIN — METOPROLOL TARTRATE 5 MG: 5 INJECTION INTRAVENOUS at 12:43

## 2022-01-01 RX ADMIN — OLANZAPINE 5 MG: 5 TABLET, ORALLY DISINTEGRATING ORAL at 18:18

## 2022-01-01 RX ADMIN — Medication 1 SPRAY: at 14:21

## 2022-01-01 RX ADMIN — SCOPALAMINE 1 PATCH: 1 PATCH, EXTENDED RELEASE TRANSDERMAL at 09:30

## 2022-01-01 RX ADMIN — Medication 2 SPRAY: at 18:53

## 2022-01-01 RX ADMIN — SCOPALAMINE 1 PATCH: 1 PATCH, EXTENDED RELEASE TRANSDERMAL at 09:55

## 2022-01-01 RX ADMIN — Medication 7.5 MG: at 02:58

## 2022-01-01 RX ADMIN — Medication 1 SPRAY: at 11:50

## 2022-01-01 RX ADMIN — GLYCOPYRROLATE 0.2 MG: 0.2 INJECTION, SOLUTION INTRAMUSCULAR; INTRAVENOUS at 21:16

## 2022-01-01 RX ADMIN — GLYCOPYRROLATE 0.2 MG: 0.2 INJECTION, SOLUTION INTRAMUSCULAR; INTRAVENOUS at 15:08

## 2022-01-01 RX ADMIN — HALOPERIDOL LACTATE 5 MG: 5 INJECTION, SOLUTION INTRAMUSCULAR at 14:54

## 2022-01-01 RX ADMIN — GLYCOPYRROLATE 0.2 MG: 0.2 INJECTION, SOLUTION INTRAMUSCULAR; INTRAVENOUS at 18:11

## 2022-01-01 RX ADMIN — GLYCOPYRROLATE 0.2 MG: 0.2 INJECTION, SOLUTION INTRAMUSCULAR; INTRAVENOUS at 14:36

## 2022-01-01 RX ADMIN — MORPHINE SULFATE 1 MG: 2 INJECTION, SOLUTION INTRAMUSCULAR; INTRAVENOUS at 10:20

## 2022-01-01 RX ADMIN — MORPHINE SULFATE 1 MG: 2 INJECTION, SOLUTION INTRAMUSCULAR; INTRAVENOUS at 19:03

## 2022-01-01 RX ADMIN — MORPHINE SULFATE 10 MG: 20 SOLUTION ORAL at 19:53

## 2022-01-01 RX ADMIN — ATROPINE SULFATE 2 DROP: 10 SOLUTION/ DROPS OPHTHALMIC at 17:29

## 2022-01-01 RX ADMIN — GLYCOPYRROLATE 0.2 MG: 0.2 INJECTION, SOLUTION INTRAMUSCULAR; INTRAVENOUS at 01:37

## 2022-01-01 RX ADMIN — Medication 2 SPRAY: at 19:54

## 2022-01-01 RX ADMIN — GLYCOPYRROLATE 0.2 MG: 0.2 INJECTION, SOLUTION INTRAMUSCULAR; INTRAVENOUS at 06:36

## 2022-01-01 RX ADMIN — Medication 1 SPRAY: at 12:07

## 2022-01-01 RX ADMIN — GLYCOPYRROLATE 0.2 MG: 0.2 INJECTION, SOLUTION INTRAMUSCULAR; INTRAVENOUS at 18:30

## 2022-01-01 RX ADMIN — ATROPINE SULFATE 2 DROP: 10 SOLUTION/ DROPS OPHTHALMIC at 14:34

## 2022-01-01 RX ADMIN — METOPROLOL TARTRATE 5 MG: 5 INJECTION INTRAVENOUS at 23:50

## 2022-01-01 RX ADMIN — METOPROLOL TARTRATE 5 MG: 5 INJECTION INTRAVENOUS at 11:50

## 2022-01-01 RX ADMIN — GLYCOPYRROLATE 0.2 MG: 0.2 INJECTION, SOLUTION INTRAMUSCULAR; INTRAVENOUS at 06:51

## 2022-01-01 RX ADMIN — Medication 1 SPRAY: at 18:43

## 2022-01-01 RX ADMIN — GLYCOPYRROLATE 0.2 MG: 0.2 INJECTION, SOLUTION INTRAMUSCULAR; INTRAVENOUS at 03:23

## 2022-01-01 RX ADMIN — MORPHINE SULFATE 5 MG: 10 SOLUTION ORAL at 11:07

## 2022-01-01 RX ADMIN — GLYCOPYRROLATE 0.2 MG: 0.2 INJECTION, SOLUTION INTRAMUSCULAR; INTRAVENOUS at 22:42

## 2022-01-01 RX ADMIN — GLYCOPYRROLATE 0.2 MG: 0.2 INJECTION, SOLUTION INTRAMUSCULAR; INTRAVENOUS at 21:26

## 2022-01-01 RX ADMIN — OLANZAPINE 2.5 MG: 5 TABLET, ORALLY DISINTEGRATING ORAL at 20:55

## 2022-01-01 RX ADMIN — Medication 2 SPRAY: at 00:12

## 2022-01-01 RX ADMIN — SODIUM CHLORIDE 1000 ML: 9 INJECTION, SOLUTION INTRAVENOUS at 09:59

## 2022-01-01 RX ADMIN — MORPHINE SULFATE 5 MG: 10 SOLUTION ORAL at 17:25

## 2022-01-01 RX ADMIN — POTASSIUM CHLORIDE AND SODIUM CHLORIDE: 450; 150 INJECTION, SOLUTION INTRAVENOUS at 17:34

## 2022-01-01 RX ADMIN — LORAZEPAM 1 MG: 2 INJECTION INTRAMUSCULAR; INTRAVENOUS at 19:44

## 2022-01-01 RX ADMIN — MORPHINE SULFATE 1 MG: 2 INJECTION, SOLUTION INTRAMUSCULAR; INTRAVENOUS at 14:33

## 2022-01-01 RX ADMIN — OLANZAPINE 5 MG: 5 TABLET, ORALLY DISINTEGRATING ORAL at 21:17

## 2022-01-01 RX ADMIN — WHITE PETROLATUM: 1.75 OINTMENT TOPICAL at 21:28

## 2022-01-01 RX ADMIN — OLANZAPINE 5 MG: 5 TABLET, ORALLY DISINTEGRATING ORAL at 18:23

## 2022-01-01 RX ADMIN — ENALAPRILAT 1.25 MG: 1.25 INJECTION INTRAVENOUS at 02:14

## 2022-01-01 RX ADMIN — Medication 7.5 MG: at 22:42

## 2022-01-01 RX ADMIN — OLANZAPINE 2.5 MG: 5 TABLET, ORALLY DISINTEGRATING ORAL at 08:32

## 2022-01-01 RX ADMIN — OLANZAPINE 5 MG: 5 TABLET, ORALLY DISINTEGRATING ORAL at 21:26

## 2022-01-01 RX ADMIN — Medication 7.5 MG: at 15:09

## 2022-01-01 RX ADMIN — ATROPINE SULFATE 2 DROP: 10 SOLUTION/ DROPS OPHTHALMIC at 18:46

## 2022-01-01 RX ADMIN — GLYCOPYRROLATE 0.2 MG: 0.2 INJECTION, SOLUTION INTRAMUSCULAR; INTRAVENOUS at 11:07

## 2022-01-01 RX ADMIN — Medication 7.5 MG: at 09:16

## 2022-01-01 RX ADMIN — GLYCOPYRROLATE 0.2 MG: 0.2 INJECTION, SOLUTION INTRAMUSCULAR; INTRAVENOUS at 10:56

## 2022-01-01 RX ADMIN — GLYCOPYRROLATE 0.2 MG: 0.2 INJECTION, SOLUTION INTRAMUSCULAR; INTRAVENOUS at 01:47

## 2022-01-01 RX ADMIN — OLANZAPINE 5 MG: 5 TABLET, ORALLY DISINTEGRATING ORAL at 22:42

## 2022-01-01 RX ADMIN — Medication 7.5 MG: at 18:30

## 2022-01-01 RX ADMIN — GLYCOPYRROLATE 0.2 MG: 0.2 INJECTION, SOLUTION INTRAMUSCULAR; INTRAVENOUS at 18:18

## 2022-01-01 RX ADMIN — LORAZEPAM 1 MG: 2 INJECTION INTRAMUSCULAR; INTRAVENOUS at 02:03

## 2022-01-01 RX ADMIN — WHITE PETROLATUM: 1.75 OINTMENT TOPICAL at 12:17

## 2022-01-01 RX ADMIN — ATROPINE SULFATE 2 DROP: 10 SOLUTION/ DROPS OPHTHALMIC at 10:15

## 2022-01-01 RX ADMIN — GLYCOPYRROLATE 0.2 MG: 0.2 INJECTION, SOLUTION INTRAMUSCULAR; INTRAVENOUS at 22:30

## 2022-01-01 RX ADMIN — Medication 1 SPRAY: at 15:19

## 2022-01-01 RX ADMIN — MORPHINE SULFATE 5 MG: 10 SOLUTION ORAL at 20:25

## 2022-01-01 RX ADMIN — METOPROLOL TARTRATE 5 MG: 5 INJECTION INTRAVENOUS at 00:06

## 2022-01-01 RX ADMIN — GLYCOPYRROLATE 0.2 MG: 0.2 INJECTION, SOLUTION INTRAMUSCULAR; INTRAVENOUS at 21:12

## 2022-01-01 RX ADMIN — LORAZEPAM 1 MG: 2 INJECTION INTRAMUSCULAR; INTRAVENOUS at 09:39

## 2022-01-01 RX ADMIN — ATROPINE SULFATE 2 DROP: 10 SOLUTION/ DROPS OPHTHALMIC at 00:13

## 2022-01-01 RX ADMIN — METOPROLOL TARTRATE 5 MG: 5 INJECTION INTRAVENOUS at 05:34

## 2022-01-01 RX ADMIN — GLYCOPYRROLATE 0.2 MG: 0.2 INJECTION, SOLUTION INTRAMUSCULAR; INTRAVENOUS at 10:24

## 2022-01-01 RX ADMIN — HALOPERIDOL LACTATE 2 MG: 5 INJECTION, SOLUTION INTRAMUSCULAR at 19:45

## 2022-01-01 RX ADMIN — HALOPERIDOL LACTATE 2 MG: 5 INJECTION, SOLUTION INTRAMUSCULAR at 14:44

## 2022-01-01 RX ADMIN — GLYCOPYRROLATE 0.2 MG: 0.2 INJECTION, SOLUTION INTRAMUSCULAR; INTRAVENOUS at 02:58

## 2022-01-01 RX ADMIN — ACETAMINOPHEN 650 MG: 325 SUPPOSITORY RECTAL at 05:34

## 2022-01-01 RX ADMIN — ENALAPRILAT 1.25 MG: 1.25 INJECTION INTRAVENOUS at 21:05

## 2022-01-01 RX ADMIN — ENALAPRILAT 1.25 MG: 1.25 INJECTION INTRAVENOUS at 08:30

## 2022-01-01 RX ADMIN — GLYCOPYRROLATE 0.2 MG: 0.2 INJECTION, SOLUTION INTRAMUSCULAR; INTRAVENOUS at 09:52

## 2022-01-01 RX ADMIN — GLYCOPYRROLATE 0.2 MG: 0.2 INJECTION, SOLUTION INTRAMUSCULAR; INTRAVENOUS at 05:23

## 2022-01-01 RX ADMIN — WHITE PETROLATUM: 1.75 OINTMENT TOPICAL at 00:12

## 2022-01-01 RX ADMIN — GLYCOPYRROLATE 0.2 MG: 0.2 INJECTION, SOLUTION INTRAMUSCULAR; INTRAVENOUS at 22:19

## 2022-01-01 RX ADMIN — HALOPERIDOL LACTATE 2 MG: 5 INJECTION, SOLUTION INTRAMUSCULAR at 18:53

## 2022-01-01 RX ADMIN — POTASSIUM CHLORIDE AND SODIUM CHLORIDE: 450; 150 INJECTION, SOLUTION INTRAVENOUS at 08:25

## 2022-01-01 RX ADMIN — OLANZAPINE 5 MG: 5 TABLET, ORALLY DISINTEGRATING ORAL at 21:24

## 2022-01-01 RX ADMIN — POTASSIUM CHLORIDE, DEXTROSE MONOHYDRATE AND SODIUM CHLORIDE: 150; 5; 450 INJECTION, SOLUTION INTRAVENOUS at 14:40

## 2022-01-01 RX ADMIN — Medication 7.5 MG: at 06:37

## 2022-01-01 RX ADMIN — LORAZEPAM 0.5 MG: 2 INJECTION INTRAMUSCULAR; INTRAVENOUS at 14:33

## 2022-01-01 RX ADMIN — ATROPINE SULFATE 2 DROP: 10 SOLUTION/ DROPS OPHTHALMIC at 16:31

## 2022-01-01 RX ADMIN — ENALAPRILAT 1.25 MG: 1.25 INJECTION INTRAVENOUS at 01:40

## 2022-01-01 RX ADMIN — LORAZEPAM 1 MG: 2 INJECTION INTRAMUSCULAR; INTRAVENOUS at 18:24

## 2022-01-01 RX ADMIN — GLYCOPYRROLATE 0.2 MG: 0.2 INJECTION, SOLUTION INTRAMUSCULAR; INTRAVENOUS at 17:25

## 2022-01-01 RX ADMIN — GLYCOPYRROLATE 0.2 MG: 0.2 INJECTION, SOLUTION INTRAMUSCULAR; INTRAVENOUS at 05:27

## 2022-01-01 RX ADMIN — MORPHINE SULFATE 10 MG: 20 SOLUTION ORAL at 16:30

## 2022-01-01 RX ADMIN — GLYCOPYRROLATE 0.2 MG: 0.2 INJECTION, SOLUTION INTRAMUSCULAR; INTRAVENOUS at 15:10

## 2022-01-01 RX ADMIN — HALOPERIDOL LACTATE 2 MG: 5 INJECTION, SOLUTION INTRAMUSCULAR at 19:12

## 2022-01-01 RX ADMIN — OLANZAPINE 5 MG: 5 TABLET, ORALLY DISINTEGRATING ORAL at 22:21

## 2022-01-01 RX ADMIN — GLYCOPYRROLATE 0.2 MG: 0.2 INJECTION, SOLUTION INTRAMUSCULAR; INTRAVENOUS at 22:21

## 2022-01-01 RX ADMIN — GLYCOPYRROLATE 0.2 MG: 0.2 INJECTION, SOLUTION INTRAMUSCULAR; INTRAVENOUS at 15:37

## 2022-01-01 RX ADMIN — Medication 2 SPRAY: at 12:17

## 2022-01-01 RX ADMIN — LORAZEPAM 1 MG: 2 INJECTION INTRAMUSCULAR; INTRAVENOUS at 16:15

## 2022-01-01 RX ADMIN — POTASSIUM CHLORIDE, DEXTROSE MONOHYDRATE AND SODIUM CHLORIDE 1000 ML: 150; 5; 450 INJECTION, SOLUTION INTRAVENOUS at 18:56

## 2022-01-01 RX ADMIN — Medication 7.5 MG: at 14:12

## 2022-01-01 RX ADMIN — GLYCOPYRROLATE 0.2 MG: 0.2 INJECTION, SOLUTION INTRAMUSCULAR; INTRAVENOUS at 09:17

## 2022-01-01 RX ADMIN — OLANZAPINE 2.5 MG: 5 TABLET, ORALLY DISINTEGRATING ORAL at 11:50

## 2022-01-01 RX ADMIN — Medication 2 DROP: at 16:37

## 2022-01-01 RX ADMIN — HALOPERIDOL LACTATE 2 MG: 5 INJECTION, SOLUTION INTRAMUSCULAR at 05:55

## 2022-01-01 RX ADMIN — Medication 7.5 MG: at 21:26

## 2022-01-01 RX ADMIN — METOPROLOL TARTRATE 5 MG: 5 INJECTION INTRAVENOUS at 18:02

## 2022-01-01 RX ADMIN — MORPHINE SULFATE 10 MG: 20 SOLUTION ORAL at 12:16

## 2022-01-01 RX ADMIN — HALOPERIDOL LACTATE 2 MG: 5 INJECTION, SOLUTION INTRAMUSCULAR at 00:10

## 2022-01-01 RX ADMIN — GLYCOPYRROLATE 0.2 MG: 0.2 INJECTION, SOLUTION INTRAMUSCULAR; INTRAVENOUS at 06:29

## 2022-01-01 RX ADMIN — Medication 2 SPRAY: at 16:30

## 2022-01-01 RX ADMIN — GLYCOPYRROLATE 0.2 MG: 0.2 INJECTION, SOLUTION INTRAMUSCULAR; INTRAVENOUS at 06:38

## 2022-01-01 RX ADMIN — GLYCOPYRROLATE 0.2 MG: 0.2 INJECTION, SOLUTION INTRAMUSCULAR; INTRAVENOUS at 14:46

## 2022-01-01 RX ADMIN — POTASSIUM CHLORIDE AND SODIUM CHLORIDE: 450; 150 INJECTION, SOLUTION INTRAVENOUS at 20:55

## 2022-01-01 RX ADMIN — LORAZEPAM 1 MG: 2 INJECTION INTRAMUSCULAR; INTRAVENOUS at 15:10

## 2022-01-01 RX ADMIN — GLYCOPYRROLATE 0.2 MG: 0.2 INJECTION, SOLUTION INTRAMUSCULAR; INTRAVENOUS at 05:10

## 2022-01-01 RX ADMIN — ATROPINE SULFATE 2 DROP: 10 SOLUTION/ DROPS OPHTHALMIC at 21:26

## 2022-01-01 RX ADMIN — OLANZAPINE 5 MG: 5 TABLET, ORALLY DISINTEGRATING ORAL at 19:57

## 2022-01-01 RX ADMIN — Medication 7.5 MG: at 01:43

## 2022-01-01 RX ADMIN — POTASSIUM CHLORIDE AND SODIUM CHLORIDE: 450; 150 INJECTION, SOLUTION INTRAVENOUS at 06:17

## 2022-01-01 RX ADMIN — GLYCOPYRROLATE 0.2 MG: 0.2 INJECTION, SOLUTION INTRAMUSCULAR; INTRAVENOUS at 02:20

## 2022-01-01 RX ADMIN — ATROPINE SULFATE 2 DROP: 10 SOLUTION/ DROPS OPHTHALMIC at 14:35

## 2022-01-01 RX ADMIN — MORPHINE SULFATE 10 MG: 20 SOLUTION ORAL at 06:34

## 2022-01-01 RX ADMIN — GLYCOPYRROLATE 0.2 MG: 0.2 INJECTION, SOLUTION INTRAMUSCULAR; INTRAVENOUS at 09:28

## 2022-01-01 RX ADMIN — LORAZEPAM 1 MG: 2 INJECTION INTRAMUSCULAR; INTRAVENOUS at 10:48

## 2022-01-01 RX ADMIN — SCOPALAMINE 1 PATCH: 1 PATCH, EXTENDED RELEASE TRANSDERMAL at 12:42

## 2022-01-01 RX ADMIN — GLYCOPYRROLATE 0.2 MG: 0.2 INJECTION, SOLUTION INTRAMUSCULAR; INTRAVENOUS at 01:43

## 2022-01-01 RX ADMIN — ENALAPRILAT 1.25 MG: 1.25 INJECTION INTRAVENOUS at 19:38

## 2022-01-01 RX ADMIN — Medication 2 DROP: at 18:59

## 2022-01-01 RX ADMIN — MORPHINE SULFATE 7.5 MG: 15 TABLET ORAL at 11:13

## 2022-01-01 RX ADMIN — GLYCOPYRROLATE 0.2 MG: 0.2 INJECTION, SOLUTION INTRAMUSCULAR; INTRAVENOUS at 14:15

## 2022-01-01 RX ADMIN — ATROPINE SULFATE 2 DROP: 10 SOLUTION/ DROPS OPHTHALMIC at 19:02

## 2022-01-01 ASSESSMENT — ACTIVITIES OF DAILY LIVING (ADL)
ADLS_ACUITY_SCORE: 53
ADLS_ACUITY_SCORE: 61
ADLS_ACUITY_SCORE: 49
ADLS_ACUITY_SCORE: 49
ADLS_ACUITY_SCORE: 61
ADLS_ACUITY_SCORE: 51
ADLS_ACUITY_SCORE: 35
ADLS_ACUITY_SCORE: 37
ADLS_ACUITY_SCORE: 51
ADLS_ACUITY_SCORE: 51
ADLS_ACUITY_SCORE: 53
ADLS_ACUITY_SCORE: 51
ADLS_ACUITY_SCORE: 51
ADLS_ACUITY_SCORE: 57
ADLS_ACUITY_SCORE: 61
ADLS_ACUITY_SCORE: 49
ADLS_ACUITY_SCORE: 35
ADLS_ACUITY_SCORE: 51
ADLS_ACUITY_SCORE: 51
ADLS_ACUITY_SCORE: 57
ADLS_ACUITY_SCORE: 49
ADLS_ACUITY_SCORE: 51
ADLS_ACUITY_SCORE: 51
ADLS_ACUITY_SCORE: 47
ADLS_ACUITY_SCORE: 49
ADLS_ACUITY_SCORE: 49
ADLS_ACUITY_SCORE: 47
ADLS_ACUITY_SCORE: 51
ADLS_ACUITY_SCORE: 47
ADLS_ACUITY_SCORE: 61
ADLS_ACUITY_SCORE: 49
ADLS_ACUITY_SCORE: 51
ADLS_ACUITY_SCORE: 59
ADLS_ACUITY_SCORE: 51
ADLS_ACUITY_SCORE: 51
ADLS_ACUITY_SCORE: 49
ADLS_ACUITY_SCORE: 51
ADLS_ACUITY_SCORE: 47
ADLS_ACUITY_SCORE: 47
ADLS_ACUITY_SCORE: 51
ADLS_ACUITY_SCORE: 59
ADLS_ACUITY_SCORE: 35
ADLS_ACUITY_SCORE: 61
ADLS_ACUITY_SCORE: 59
ADLS_ACUITY_SCORE: 37
ADLS_ACUITY_SCORE: 59
ADLS_ACUITY_SCORE: 51
ADLS_ACUITY_SCORE: 61
ADLS_ACUITY_SCORE: 53
ADLS_ACUITY_SCORE: 35
DEPENDENT_IADLS:: CLEANING;COOKING;LAUNDRY;SHOPPING;MONEY MANAGEMENT;MEDICATION MANAGEMENT;MEAL PREPARATION;TRANSPORTATION;INCONTINENCE
ADLS_ACUITY_SCORE: 51
ADLS_ACUITY_SCORE: 49
ADLS_ACUITY_SCORE: 47
ADLS_ACUITY_SCORE: 51
ADLS_ACUITY_SCORE: 35
ADLS_ACUITY_SCORE: 37
ADLS_ACUITY_SCORE: 53
ADLS_ACUITY_SCORE: 35
ADLS_ACUITY_SCORE: 35
ADLS_ACUITY_SCORE: 51
ADLS_ACUITY_SCORE: 53
ADLS_ACUITY_SCORE: 49
ADLS_ACUITY_SCORE: 51
ADLS_ACUITY_SCORE: 61
ADLS_ACUITY_SCORE: 51
ADLS_ACUITY_SCORE: 57
ADLS_ACUITY_SCORE: 49
ADLS_ACUITY_SCORE: 51
ADLS_ACUITY_SCORE: 51
ADLS_ACUITY_SCORE: 35
ADLS_ACUITY_SCORE: 49
ADLS_ACUITY_SCORE: 57
ADLS_ACUITY_SCORE: 35
ADLS_ACUITY_SCORE: 59
ADLS_ACUITY_SCORE: 47
ADLS_ACUITY_SCORE: 53
ADLS_ACUITY_SCORE: 61
ADLS_ACUITY_SCORE: 51
ADLS_ACUITY_SCORE: 51
ADLS_ACUITY_SCORE: 53
ADLS_ACUITY_SCORE: 53
ADLS_ACUITY_SCORE: 37
ADLS_ACUITY_SCORE: 57
ADLS_ACUITY_SCORE: 51
ADLS_ACUITY_SCORE: 53
ADLS_ACUITY_SCORE: 49
ADLS_ACUITY_SCORE: 59
ADLS_ACUITY_SCORE: 35
ADLS_ACUITY_SCORE: 37
ADLS_ACUITY_SCORE: 49
ADLS_ACUITY_SCORE: 57
ADLS_ACUITY_SCORE: 57
ADLS_ACUITY_SCORE: 59
ADLS_ACUITY_SCORE: 35
ADLS_ACUITY_SCORE: 55
ADLS_ACUITY_SCORE: 57
ADLS_ACUITY_SCORE: 53
ADLS_ACUITY_SCORE: 61
ADLS_ACUITY_SCORE: 51
ADLS_ACUITY_SCORE: 47
ADLS_ACUITY_SCORE: 49
ADLS_ACUITY_SCORE: 51
ADLS_ACUITY_SCORE: 49
ADLS_ACUITY_SCORE: 61
ADLS_ACUITY_SCORE: 49
ADLS_ACUITY_SCORE: 53
ADLS_ACUITY_SCORE: 49
ADLS_ACUITY_SCORE: 53
ADLS_ACUITY_SCORE: 61
ADLS_ACUITY_SCORE: 61
ADLS_ACUITY_SCORE: 51
ADLS_ACUITY_SCORE: 53
ADLS_ACUITY_SCORE: 49
ADLS_ACUITY_SCORE: 59
ADLS_ACUITY_SCORE: 47
ADLS_ACUITY_SCORE: 51
ADLS_ACUITY_SCORE: 57
ADLS_ACUITY_SCORE: 59
ADLS_ACUITY_SCORE: 51
ADLS_ACUITY_SCORE: 53
ADLS_ACUITY_SCORE: 51
ADLS_ACUITY_SCORE: 51
ADLS_ACUITY_SCORE: 61

## 2022-06-21 NOTE — TELEPHONE ENCOUNTER
[facility] Call Center    Phone Message    May a detailed message be left on voicemail: yes     Reason for Call: Medication Refill Request    Has the patient contacted the pharmacy for the refill? Yes   Name of medication being requested: furosemide (LASIX) 40 MG tablet  Provider who prescribed the medication: Dr Ya  Pharmacy: [facility] [address] [address]  Date medication is needed: 06.24.22    Jose's wife Shahla called about getting a refill for a 20 MG dose of furosemide. She spoke to the pharmacy and they told her they never got a script for 20 MG. I see that the 40 MG script does state to cut the pill in half and take 20 MG a day and she confirmed that she is going that. Jose has about three days of medications left. Is Dr Ya planning to put in a new script for 20 MG or should they continue to get the 40 MG and cut the pill in half? Please give Shahla a call back to discuss.    Action Taken: Other: Cardiology    Travel Screening: Not Applicable

## 2022-06-21 NOTE — TELEPHONE ENCOUNTER
Spoke with wife Shahla who is requesting a refill of 20 mg tablets instead of the 40 mg tablets that patient currently has and is cutting in half.  [facility] Refill Guideline reviewed.  Medication meets criteria for refill.   Ivonne Mendez RN on 6/21/2022 at 1:33 PM

## 2022-11-28 PROBLEM — R41.82 ALTERED MENTAL STATUS, UNSPECIFIED ALTERED MENTAL STATUS TYPE: Status: ACTIVE | Noted: 2022-01-01

## 2022-11-28 PROBLEM — I61.9 INTRAPARENCHYMAL HEMORRHAGE OF BRAIN (H): Status: ACTIVE | Noted: 2022-01-01

## 2022-11-28 NOTE — CONSULTS
[facility]    Neurosurgery Consultation     Date of Admission:  11/28/2022  Date of Consult (When I saw the patient): 11/28/22    Assessment & Plan   Jose Rucker is a 89 year old male with history of prostate cancer, dementia, pacemaker, and atrial fibrillation on Eliquis, who was admitted on 11/28/2022 with confusion and lethargy. Radiographic findings include a small volume acute intraventricular extension of hemorrhage layering in the posterior left atrium, accompanied by an 8 mm focus of acute intraparenchymal hemorrhage in the medial left temporal lobe.     Plan:  - No surgical intervention planned at this time  - Recommend Neurology consult for further evaluation of symptoms   - Hold Eliquis   - Continue neuro checks  - Repeat head CT at 6 hours   - Goal normotension   - Appreciate assistance from specialties     I have discussed the following assessment and plan with Dr. Bell.     Adriana Stafford, CNP  [facility]  [address] [place] [id number]  Tel [phone]  Pager [phone]    Code Status    No Order    Reason for Consult   Reason for consult: I was asked by Dr. Sue to evaluate this patient for intraparenchymal hemorrhage.    Primary Care Physician   Physician No Ref-Primary    Chief Complaint   Confusion     History is obtained from the electronic health record, emergency department physician and patient's family    History of Present Illness   Jose Rucker is a 89 year old male with history of prostate cancer, dementia, pacemaker, and atrial fibrillation on Eliquis, who was admitted on 11/28/2022 with confusion and lethargy. Per family at bedside, patient appeared well on 11/24/22 but has experienced increased confusion and lethargy since then. Patient lives with wife in an independent living facility. He presented to the ED via EMS. Radiographic findings include a small volume acute  intraventricular extension of hemorrhage layering in the posterior left atrium, accompanied by an 8 mm focus of acute intraparenchymal hemorrhage in the medial left temporal lobe. On exam, patient is awake, confused, not following commands or answering questions appropriately, moves all extremities spontaneously. Family denies any recent falls or injuries.     CT HEAD WITHOUT CONTRAST November 28, 2022 10:23 AM  IMPRESSION:   1. Small volume acute intraventricular extension of hemorrhage  layering in the posterior left atrium. This is accompanied by an 8 mm  focus of acute intraparenchymal hemorrhage in the medial left temporal  lobe without adjacent surrounding hypoattenuating vasogenic edema. As  the cause of this medial left temporal lobe intraparenchymal  hemorrhage is indeterminate, and the paucity of surrounding vasogenic  edema is somewhat atypical, contrast enhanced brain MRI is recommended  to evaluate for a possible underlying cause, to include an underlying  vascular or mass lesion, as well as the possibility of cerebral  amyloid angiopathy.  2.  Moderate to severe burden chronic small vessel ischemic changes  with a moderate diffuse parenchymal volume loss.    Past Medical History   I have reviewed this patient's medical history and updated it with pertinent information if needed.   Past Medical History:   Diagnosis Date     A-fib (H)      Bradycardia     PPM 5/27/2008     Cardiac pacemaker in situ 9/17/2014 2008 PPM Medtronic Sensia, model# SEDR01, serial# [id number]       Hearing loss      Heart murmur 5/23/2007    Overview:  Benign Echo     Hernia, abdominal      HTN (hypertension)      Hyperlipidemia with target LDL less than 130 5/23/2007    Overview:  ICD 10     Irregular heart beat     AFIB     Osteoarthritis of pelvic region 11/12/2008    Overview:  LW Modifier:  Rt, SHARON done 1/15/09 LW Onset:  5-6 years ; DJD Hip     Pacemaker      Palpitations      Primary cardiomyopathy (H) 11/4/2014      Problem list name updated by automated process. Provider to review     Prostate cancer (H) 6/26/2007    Overview:  Mekhi Score:5+5=1 Radiation Therapy x 25 + Radioactive Seed Implant     Sick sinus syndrome (H)      Syncope      Syncope        Past Surgical History   I have reviewed this patient's surgical history and updated it with pertinent information if needed.  Past Surgical History:   Procedure Laterality Date     APPLY WOUND VAC Left 7/16/2020    Procedure: WOUND VAC PLACEMENT;  Surgeon: Phil Orellana MD;  Location: [facility] OR     CYSTOSCOPY       EP PPM GENERATOR CHANGE DUAL  2/10/16     HERNIA REPAIR       IMPLANT PACEMAKER  5/7/08    Medtronic Sensia, model# SEDR01, serial# [id number]     IRRIGATION AND DEBRIDEMENT LOWER EXTREMITY, COMBINED Left 7/16/2020    Procedure: IRRIGATION AND DEBRIDEMENT LEFT LEG WOUND;  Surgeon: Phil Orellana MD;  Location: [facility] OR     PROSTATE SURGERY         Prior to Admission Medications   Prior to Admission Medications   Prescriptions Last Dose Informant Patient Reported? Taking?   Diphenhydramine-APAP, sleep, (TYLENOL PM EXTRA STRENGTH PO)   Yes No   Sig: Take 500 mg by mouth daily   Multiple Minerals-Vitamins (PROSTEON PO)   Yes No   Sig: Take 500 mg by mouth 2 tablets twice daily    apixaban ANTICOAGULANT (ELIQUIS) 5 MG tablet   No No   Sig: Take 1 tablet (5 mg) by mouth 2 times daily   furosemide (LASIX) 20 MG tablet   No No   Sig: Take 1 tablet (20 mg) by mouth daily   lisinopril (ZESTRIL) 2.5 MG tablet   No No   Sig: Take 1 tablet (2.5 mg) by mouth daily   metoprolol succinate ER (TOPROL XL) 25 MG 24 hr tablet   No No   Sig: Take 1 tablet (25 mg) by mouth 2 times daily   simvastatin (ZOCOR) 20 MG tablet   No No   Sig: Take 1 tablet (20 mg) by mouth At Bedtime      Facility-Administered Medications: None     Allergies   Allergies   Allergen Reactions     Penicillin G Rash     And patient had an awful smell        Social History   I have reviewed this patient's social  "history and updated it with pertinent information if needed. Jose Rucker  reports that he quit smoking about 66 years ago. His smoking use included cigarettes. He has a 3.00 pack-year smoking history. He has never used smokeless tobacco. He reports current alcohol use. He reports that he does not use drugs.    Family History   I have reviewed this patient's family history and updated it with pertinent information if needed.   Family History   Problem Relation Age of Onset     Heart Disease Mother        Review of Systems   10 point ROS negative other than symptoms noted in HPI.    Physical Exam   Temp: 97.9  F (36.6  C) Temp src: Axillary                Vital Signs with Ranges  Temp:  [97.9  F (36.6  C)] 97.9  F (36.6  C)  170 lbs 0 oz     , Temperature 97.9  F (36.6  C), temperature source Axillary, height 1.791 m (5' 10.5\"), weight 77.1 kg (170 lb).  170 lbs 0 oz    NEUROLOGICAL EXAMINATION:   Awake, hard of hearing   Oriented to self, able to state name and date of birth   Confused to place and time  PERRL  Unable to answer questions appropriately or follow commands  Moves all extremities spontaneously     Data     CBC RESULTS:   Recent Labs   Lab Test 11/28/22  0959   WBC 9.2   RBC 3.67*   HGB 11.3*   HCT 36.0*   MCV 98   MCH 30.8   MCHC 31.4*   RDW 12.8        Basic Metabolic Panel:  Lab Results   Component Value Date     11/28/2022     11/23/2020      Lab Results   Component Value Date    POTASSIUM 4.1 11/28/2022    POTASSIUM 4.4 12/03/2021    POTASSIUM 3.8 11/23/2020     Lab Results   Component Value Date    CHLORIDE 99 11/28/2022    CHLORIDE 106 12/03/2021    CHLORIDE 102 11/23/2020     Lab Results   Component Value Date    ENEDELIA 9.5 11/28/2022    ENEDELIA 8.9 11/23/2020     Lab Results   Component Value Date    CO2 24 11/28/2022    CO2 25 12/03/2021    CO2 26 11/23/2020     Lab Results   Component Value Date    BUN 28.9 11/28/2022    BUN 19 12/03/2021    BUN 19 11/23/2020     Lab Results "   Component Value Date    CR 1.16 11/28/2022    CR 1.19 11/23/2020     Lab Results   Component Value Date     11/28/2022    GLC 98 12/03/2021     11/23/2020     INR:  Lab Results   Component Value Date    INR 1.07 02/10/2016    INR 1.51 05/27/2008    INR 1.00 04/05/2008    INR 0.94 02/06/2008

## 2022-11-28 NOTE — ED NOTES
".[facility]  ED Nurse Handoff Report    Jose Rucker is a 89 year old male   ED Chief complaint: increased confusion/lethargy  . ED Diagnosis:   Final diagnoses:   Altered mental status, unspecified altered mental status type   Intraparenchymal hemorrhage of brain (H)     Allergies:   Allergies   Allergen Reactions    Penicillin G Rash     And patient had an awful smell        Code Status: Full Code  Activity level - Baseline/Home:  Independent. Activity Level - Current:   Stand by Assist. Lift room needed: No. Bariatric: No   Needed: No   Isolation: No. Infection: Not Applicable.     Vital Signs:   Vitals:    11/28/22 1626 11/28/22 1641 11/28/22 2019 11/28/22 2020   BP: (!) 140/69 (!) 141/70 134/57    Pulse:   70    Resp:       Temp:       TempSrc:       SpO2:   96% 97%   Weight:       Height:           Cardiac Rhythm:  ,      Pain level:    Patient confused: Yes. Patient Falls Risk: Yes.   Elimination Status:  hx of incontinence, has voided. Male purewick in place    Patient Report - Initial Complaint: history is provided by the patient, a relative and the spouse. History limited by: Dementia.   Jose Rucker is a 89 year old male on Eliquis with history of atrial fibrillation, hypertension, hyperlipidemia, and prostate cancer, who presents with increased confusion and lethargy. Per EMS the patient arrives from an independent living facility where he lives with his wife where he has recently experienced increased fatigue.  The patient's family reports that the patient appeared well on 11/24 at thanksgiving dinner but since then has experienced increased confusion and lethargy. They report episode of speaking gibberish. The patient's wife reports that this morning the patient woke up and took 3 steps before sitting down and stating \"I'm going to sit down, I think I'm dying.\" He uses a walker at baseline to ambulate. The patient has hearing aids but does not regularly wear them.. "   Focused Assessment:   Neuro Cognitive (Adult) Cognitive/Neuro/Behavioral WDL: .WDL except  (hx dementia; increased confusion and lethargy per family. Unable to answer questions appropriately.)   Traci Coma Scale Best Eye Response: 3-->(E3) to speech  Best Motor Response: 5-->(M5) localizes pain  Best Verbal Response: 4-->(V4) confused  O'Kean Coma Scale Score: 12  LA          1000  HEENT  HEENT  Ear WDL Hearing Aids/Devices Care: other (see comments)  ([place], family report they communicate by writing what they are saying. Has been able to respond appropriately until yesterday)       Skin WDL Skin WDL: .WDL except  Skin Comments: anamaria area excoriated anterior and posterior, no open wounds noted to the anamaria area   Neuro Cognitive (Adult) Cognitive/Neuro/Behavioral WDL: .WDL except   O'Kean Coma Scale Best Eye Response: 4-->(E4) spontaneous  Best Motor Response: 5-->(M5) localizes pain  Best Verbal Response: 4-->(V4) confused  Traci Coma Scale Score: 13   Tests Performed: CT head, UA, labs. Abnormal Results: .  Labs Ordered and Resulted from Time of ED Arrival to Time of ED Departure   COMPREHENSIVE METABOLIC PANEL - Abnormal       Result Value    Sodium 135 (*)     Potassium 4.1      Chloride 99      Carbon Dioxide (CO2) 24      Anion Gap 12      Urea Nitrogen 28.9 (*)     Creatinine 1.16      Calcium 9.5      Glucose 119 (*)     Alkaline Phosphatase 52      AST 21      ALT 14      Protein Total 6.8      Albumin 3.5      Bilirubin Total 0.4      GFR Estimate 59 (*)    TROPONIN T, HIGH SENSITIVITY - Abnormal    Troponin T, High Sensitivity 60 (*)    ROUTINE UA WITH MICROSCOPIC REFLEX TO CULTURE - Abnormal    Color Urine Light Yellow      Appearance Urine Clear      Glucose Urine Negative      Bilirubin Urine Negative      Ketones Urine Negative      Specific Gravity Urine 1.018      Blood Urine Trace (*)     pH Urine 6.5      Protein Albumin Urine 20 (*)     Urobilinogen Urine Normal      Nitrite Urine Negative       Leukocyte Esterase Urine Negative      Mucus Urine Present (*)     RBC Urine 2      WBC Urine 1      Hyaline Casts Urine 1     CBC WITH PLATELETS AND DIFFERENTIAL - Abnormal    WBC Count 9.2      RBC Count 3.67 (*)     Hemoglobin 11.3 (*)     Hematocrit 36.0 (*)     MCV 98      MCH 30.8      MCHC 31.4 (*)     RDW 12.8      Platelet Count 262      % Neutrophils 70      % Lymphocytes 19      % Monocytes 10      % Eosinophils 1      % Basophils 0      % Immature Granulocytes 0      NRBCs per 100 WBC 0      Absolute Neutrophils 6.4      Absolute Lymphocytes 1.7      Absolute Monocytes 0.9      Absolute Eosinophils 0.1      Absolute Basophils 0.0      Absolute Immature Granulocytes 0.0      Absolute NRBCs 0.0     LACTIC ACID WHOLE BLOOD - Normal    Lactic Acid 1.0     MAGNESIUM - Normal    Magnesium 2.1     TSH WITH FREE T4 REFLEX - Normal    TSH 0.77     INFLUENZA A/B & SARS-COV2 PCR MULTIPLEX - Normal    Influenza A PCR Negative      Influenza B PCR Negative      RSV PCR Negative      SARS CoV2 PCR Negative     .  CT Head w/o Contrast   Final Result   Abnormal   IMPRESSION:   1.  Small amount of intraventricular blood products at the left occipital horn. Suspected source includes 1 cm foraminal hematoma at the medial left temporal lobe.   2.  Cerebral volume loss and presumed chronic small vessel ischemic disease.         [Critical Result: Acute intracranial hemorrhage]      Finding was identified on 11/28/2022 5:19 PM.       1.  Dr. Grace was contacted by me on 11/28/2022 5:39 PM and verbalized understanding of the critical result.       XR Chest 2 Views   Final Result   IMPRESSION:There are no acute infiltrates. The cardiac silhouette is   not enlarged. Pulmonary vasculature is unremarkable.      ABBE STARKS MD            SYSTEM ID:  [id number]      CT Head w/o Contrast   Final Result   IMPRESSION:    1. Small volume acute intraventricular extension of hemorrhage   layering in the posterior left atrium. This is  accompanied by an 8 mm   focus of acute intraparenchymal hemorrhage in the medial left temporal   lobe without adjacent surrounding hypoattenuating vasogenic edema. As   the cause of this medial left temporal lobe intraparenchymal   hemorrhage is indeterminate, and the paucity of surrounding vasogenic   edema is somewhat atypical, contrast enhanced brain MRI is recommended   to evaluate for a possible underlying cause, to include an underlying   vascular or mass lesion, as well as the possibility of cerebral   amyloid angiopathy.   2.  Moderate to severe burden chronic small vessel ischemic changes   with a moderate diffuse parenchymal volume loss.      Findings and impression discussed with Dr. Sue by phone at 1100   hours on 11/28/2022.      TEZ BANKS MD            SYSTEM ID:  [id number]           Treatments provided: [facility] haldol for agitation   Comments: patient had become agitated, trying to get out of bed, pulled cardiac monitor off and undressed self.  Sitter at bedside  OBS brochure/video discussed/provided to patient:  N/A  ED Medications:   Medications   dextrose 5% and 0.45% NaCl + KCl 20 mEq/L infusion ( Intravenous Rate/Dose Verify 11/28/22 2025)   ondansetron (ZOFRAN ODT) ODT tab 4 mg (has no administration in time range)     Or   ondansetron (ZOFRAN) injection 4 mg (has no administration in time range)   metoprolol (LOPRESSOR) injection 2.5 mg (has no administration in time range)   haloperidol lactate (HALDOL) injection 2 mg (2 mg Intravenous Given 11/28/22 1853)   LORazepam (ATIVAN) injection 0.5-1 mg (1 mg Intravenous Given 11/28/22 1944)   0.9% sodium chloride BOLUS (0 mLs Intravenous Stopped 11/28/22 1142)   LORazepam (ATIVAN) injection 0.5 mg (0.5 mg Intravenous Given 11/28/22 1433)   haloperidol lactate (HALDOL) injection 5 mg (5 mg Intramuscular Given 11/28/22 1454)     Drips infusing:  No  For the majority of the shift, the patient's behavior Red. Interventions performed were rcvd meds  and sitter is at bedside.    Sepsis treatment initiated: No     Patient tested for COVID 19 prior to admission: YES    ED Nurse Name/Phone Number: Chrissie Mcintyre RN,   3:05 PM

## 2022-11-28 NOTE — PHARMACY-ADMISSION MEDICATION HISTORY
Admission medication history interview status for this patient is complete. See [facility] admission navigator for allergy information, prior to admission medications and immunization status.     Medication history interview done, indicate source(s): Family  Medication history resources (including written lists, pill bottles, clinic record):None    Changes made to PTA medication list:  Added: none  Changed: tylenol pm to 2 capsules  Reported as Not Taking: none  Removed: none    Actions taken by pharmacist (provider contacted, etc):None     Additional medication history information:None    Medication reconciliation/reorder completed by provider prior to medication history?  N   (Y/N)     Prior to Admission medications    Medication Sig Last Dose Taking? Auth Provider Long Term End Date   apixaban ANTICOAGULANT (ELIQUIS) 5 MG tablet Take 1 tablet (5 mg) by mouth 2 times daily 11/27/2022 Yes Mode Ya MD     Diphenhydramine-APAP, sleep, (TYLENOL PM EXTRA STRENGTH PO) Take 2 capsules by mouth At Bedtime 11/27/2022 Yes Reported, Patient     furosemide (LASIX) 20 MG tablet Take 1 tablet (20 mg) by mouth daily 11/27/2022 Yes Mode Ya MD Yes    lisinopril (ZESTRIL) 2.5 MG tablet Take 1 tablet (2.5 mg) by mouth daily 11/27/2022 Yes Mode Ya MD Yes    metoprolol succinate ER (TOPROL XL) 25 MG 24 hr tablet Take 1 tablet (25 mg) by mouth 2 times daily 11/27/2022 Yes Mode Ya MD Yes    Multiple Minerals-Vitamins (PROSTEON PO) Take 500 mg by mouth 2 tablets twice daily  11/27/2022 Yes Reported, Patient     simvastatin (ZOCOR) 20 MG tablet Take 1 tablet (20 mg) by mouth At Bedtime 11/27/2022 Yes Mode Ya MD Yes

## 2022-11-28 NOTE — ED NOTES
Patient cleansed of stool/urine. Clean brief placed, straight cathed for urine.    [Carbohydrate Consistent Diet] : carbohydrate consistent diet [Hypoglycemia Management] : hypoglycemia management [Diabetes Foot Care] : diabetes foot care [Importance of Diet and Exercise] : importance of diet and exercise to improve glycemic control, achieve weight loss and improve cardiovascular health [Long Term Vascular Complications] : long term vascular complications of diabetes [Self Monitoring of Blood Glucose] : self monitoring of blood glucose [Injection Technique, Storage, Sharps Disposal] : injection technique, storage, and sharps disposal [Levothyroxine] : The patient was instructed to take Levothyroxine on an empty stomach, separate from vitamins, and wait at least 30 minutes before eating [Incretin Mimetic Therapy] : Risks and benefits of incretin mimetic therapy were discussed with the patient including nauseau, pancreatitis and potential risk of medullary thyroid cancer [FreeTextEntry1] : Patient with well controlled type 2 diabetes associated with obesity. We discussed the importance of weight loss in the management of his comorbidities. We discussed the risks of continued excess weight on long term health. \par At this time, he will continue taking his medications as instructed. We discussed the importance of lifestyle modification in addition to the medication. I have encouraged him to continue to cut down on the amount of diet snapple he drinks. We discussed incorporating exercise into his ADLs. \par Carmita rucker stable 12/2018. \par Script given for fasting labs.\par . \par

## 2022-11-28 NOTE — ED TRIAGE NOTES
"Arrives via EMS from [facility] where he resides on independent side with wife.  Hx dementia and pacemaker.  Reported increased confusion over the past few days along with sleeping most of the day.  Family could not wake him today.  EMS used sternal rub for response.  Ambulated on scene.  Patient is resisting any care at this time.  Holding mouth closed tightly while trying to obtain temp, pushing back on bed when trying to sit him up to remove sweatshirt, making fists and strongly holding arms against body when trying to place BP cuff. Family unable to state baseline mental status.  Report \"until recently\" was able to answer questions appropriately, then state \"it's hard to tell because he's very hard of hearing.\"  Family report they write out what they want to say to him due to the [place]. Report until yesterday was able to answer written questions. 18g in left wrist, CK=548, paced rhythm.  Patient not answering questions appropriate here with speaking. EMS report patient falls asleep quickly after having interaction with him.  Eyes closed. Family report yesterday \"talking gibberish, and seeing things in the room\".       "

## 2022-11-28 NOTE — PROGRESS NOTES
Discussed CT head results with Dr. Styles from radiology.     CT showed continued acute intraparenchymal hemorrhage, measured at 10 mm on this CT scan (measured at 8 mm on previous scan).  Radiologist was able to view report from prior scan but not images but noted not significantly different.  Will reach out to NS to get opinion and let daughter know results.    Addendum: Discussed with on-call neurosurgery provider.  Will obtain repeat CT head in the a.m. to monitor progress.  If patient does decline or gets worse then transition to comfort measures.  I did discuss with family who were at bedside who all did voice understanding and agreement with plan.  They voiced that he did not want any aggressive interventions.      Sukumar Grace MD

## 2022-11-28 NOTE — ED NOTES
Patient trying to crawl out of bed while hospitalist in room.  Pulled cardiac monitor and gown off.  Hospitalist ordered 0.5mg ativan. Staff pulled to sit at bedside.

## 2022-11-28 NOTE — H&P
"[facility] Hospitalist Admission Note  Name: Jose Rucker    MRN: [id number]  YOB: 1931    Age: 89 year old  Date of admission: 11/28/2022  Primary care provider: No Ref-Primary, Physician      Assessment & Plan   Jose Rucker is a 89 year old male past medical history significant for atrial fibrillation on Eliquis, hypertension, hyperlipidemia, prostate cancer, who was admitted on 11/28/2022 with intra parenchymal hemorrhage after presenting with 4 days of altered mental status.     Intraparenchymal Hemorrhage  Pt with 4 days progressive AMS. Described as \"speaking gibberish\", lethargic, ataxia.   Radiographic findings include a small volume acute intraventricular extension of hemorrhage layering in the posterior left atrium, accompanied by an 8 mm focus of acute intraparenchymal hemorrhage in the medial left temporal lobe.   He has no seizure history, history of brain surgery or stroke. No recent falls or trauma. On chronic anticoagulation with DOAC for A. Fib without prior bleeding issues.  - Neurosurgery was consulted from the emergency department and did not recommend surgical intervention, they recommended neurology consult for further evaluation of the patient's symptoms. They did not recommend transfer of patient to OSH.   - Patient's wife and daughter are at bedside. They express understanding of guarded prognosis and state that the patient's goals are comfort focused/hospice if he fails to improve or demonstrates clinical worsening. PPM would need to be de activated.   - Repeat Head CT is @1700   - BP/HOB/Neuro check parameters per NSG  - he is very altered and not taking po medications, following commands thus will need bedside attendant, PRN medications including haldol/ativan.  Family are aware of this understand the implications that this provides into expansion of further work-up especially in guarded prognosis.  - if his repeat head CT is stable and symptoms are " "improving - or he is able to tolerate further work up, family may be interested in Neurology consultation. At this time this but at this time this is not expected to be the case and likely will be comfort cares. Due to PPM, no capability to perform MRI at our hospital.   - place clarke for now. Pending clinical course TOV.   - IV fluids for now while NPO. Pending improvement in status will need dysphagia screening prior to any diet order.   -  consult for disposition assistance   - signed out to PM provider pending clinical course may need adjustments in medications.     PAF  Cardiomyopathy  SSS s/p PPM  -  DOAC held  - IV Toprol as needed    Prostate Cancer    S/P Combined hormonal therapy and radiotherapy in 2008.  His last hormone injection was in 2010.  Patient's PSA has remained undetectable at <0.01.    Insomnia  - Takes 2 Tylenol PM at bedtime may need to replace with IV agent pending ability to tolerate p.o. medications      DVT Prophylaxis: VTE Prophylaxis contraindicated due to [facility]  Code Status: DNR / DNI - as discsussed with patient's wife, Shahla and daughter Dilcia on admission.   Disposition: Unclear. Comes from [facility] living apartment with wife, deviously ambulatory with a walker.  He has 1 daughter and son who are both local.      Montse Rosa PA-C    Primary Care Physician   Physician No Ref-Primary    Chief Complaint   \"altered mental status\"    Unable to obtain a history from the patient due to confusion   Services Used: No    History of Present Illness   Jose Rucker is a 89 year old male who was brought into the emergency department for evaluation of altered mental status.   Mr. Rucker was in his usual state of health on Thanksgiving (11/24).  He went to his son's on Thanksgiving and shared a meal with his family.  The following day (11/25) his wife noted that he was very drowsy and sleeping all the time.  She reported that she just thought it was worn out from the holiday. " "On 11/26 he became more altered with rapid speech, insomnia, moving was abnormal.  This morning (11/28) wife noticed that he was having trouble sitting and holding himself upright in a chair.  Around 08:30 he told his wife \"I'm going to sit down, I think I'm dying.\" He took 6 steps and then had to somewhat slumped down in a chair.  The wife contacted a neighbor who for help who called 911.    Wife states he has no history of seizure disorder no history of stroke.  There have been no recent falls or injury.  He did not use any drugs or alcohol.  At baseline he is ambulatory with a walker.  His last dose of Eliquis was last night and he has been compliant with his medications to the family knowledge.     In the ED vital signs are stable.  CBC with normocytic anemia, CMP creatinine of 1.16, TSH WNL. No lactic acidosis. UA negative.  Viral PCR negative for influenza, COVID, RSV detectable  high-sensitivity troponin at 60.  Noncontrast head CT showed acute intraventricular extension of hemorrhage layering in the posterior left atrium, 8 mm foci of acute intraparenchymal hemorrhage in the medial left temporal lobe, radiology the cause of medial left temporal lobe hemorrhage was indeterminate with atypical surrounding vasogenic edema.  Contrast brain MRI was recommended for further delineation however the patient has a pacemaker and would not be able to have the study performed here at [facility].  Underlying chronic small vessel ischemic changes with moderate diffuse parenchymal volume loss is noted on the noncontrast head CT.    Discussed with Dr. Sue in the ED, full chart review including lab work, imaging, and vital signs were reviewed. Patient received 1 L NS bolus in the in the ED. I contacted neurosurgery to review the imaging and anticipatory guidance.     Past Medical History    I have reviewed this patient's medical history and updated it with pertinent information if needed.   Past Medical History: "   Diagnosis Date     A-fib (H)      Bradycardia     PPM 5/27/2008     Cardiac pacemaker in situ 9/17/2014    2008 PPM Medtronic Sensia, model# SEDR01, serial# [id number]       Hearing loss      Heart murmur 5/23/2007    Overview:  Benign Echo     Hernia, abdominal      HTN (hypertension)      Hyperlipidemia with target LDL less than 130 5/23/2007    Overview:  ICD 10     Irregular heart beat     AFIB     Osteoarthritis of pelvic region 11/12/2008    Overview:  LW Modifier:  Rt, SHARON done 1/15/09 LW Onset:  5-6 years ; DJD Hip     Pacemaker      Palpitations      Primary cardiomyopathy (H) 11/4/2014     Problem list name updated by automated process. Provider to review     Prostate cancer (H) 6/26/2007    Overview:  [place] Score:5+5=1 Radiation Therapy x 25 + Radioactive Seed Implant     Sick sinus syndrome (H)      Syncope      Syncope        Past Surgical History   I have reviewed this patient's surgical history and updated it with pertinent information if needed.  Past Surgical History:   Procedure Laterality Date     APPLY WOUND VAC Left 7/16/2020    Procedure: WOUND VAC PLACEMENT;  Surgeon: Phil Orellana MD;  Location: [facility] OR     CYSTOSCOPY       EP PPM GENERATOR CHANGE DUAL  2/10/16     HERNIA REPAIR       IMPLANT PACEMAKER  5/7/08    Medtronic Sensia, model# SEDR01, serial# [id number]     IRRIGATION AND DEBRIDEMENT LOWER EXTREMITY, COMBINED Left 7/16/2020    Procedure: IRRIGATION AND DEBRIDEMENT LEFT LEG WOUND;  Surgeon: Phil Orellana MD;  Location: [facility] OR     PROSTATE SURGERY         Prior to Admission Medications   Prior to Admission Medications   Prescriptions Last Dose Informant Patient Reported? Taking?   Diphenhydramine-APAP, sleep, (TYLENOL PM EXTRA STRENGTH PO) 11/27/2022  Yes Yes   Sig: Take 2 capsules by mouth At Bedtime   Multiple Minerals-Vitamins (PROSTEON PO) 11/27/2022  Yes Yes   Sig: Take 500 mg by mouth 2 tablets twice daily    apixaban ANTICOAGULANT (ELIQUIS) 5 MG tablet 11/27/2022  No  Yes   Sig: Take 1 tablet (5 mg) by mouth 2 times daily   furosemide (LASIX) 20 MG tablet 11/27/2022  No Yes   Sig: Take 1 tablet (20 mg) by mouth daily   lisinopril (ZESTRIL) 2.5 MG tablet 11/27/2022  No Yes   Sig: Take 1 tablet (2.5 mg) by mouth daily   metoprolol succinate ER (TOPROL XL) 25 MG 24 hr tablet 11/27/2022  No Yes   Sig: Take 1 tablet (25 mg) by mouth 2 times daily   simvastatin (ZOCOR) 20 MG tablet 11/27/2022  No Yes   Sig: Take 1 tablet (20 mg) by mouth At Bedtime      Facility-Administered Medications: None     Allergies   Allergies   Allergen Reactions     Penicillin G Rash     And patient had an awful smell        Social History   I have reviewed this patient's social history and updated it with pertinent information if needed. Jose Rucker  reports that he quit smoking about 66 years ago. His smoking use included cigarettes. He has a 3.00 pack-year smoking history. He has never used smokeless tobacco. He reports current alcohol use. He reports that he does not use drugs.    Family History   I have reviewed this patient's family history and updated it with pertinent information if needed.   Family History   Problem Relation Age of Onset     Heart Disease Mother        Review of Systems   The 10 point Review of Systems is negative other than noted in the HPI or here.     Physical Exam   Temp: 97.8  F (36.6  C) Temp src: Rectal BP: 119/63 Pulse: 63   Resp: 18 SpO2: 95 % O2 Device: None (Room air)    Vital Signs with Ranges  Temp:  [97.8  F (36.6  C)-97.9  F (36.6  C)] 97.8  F (36.6  C)  Pulse:  [63] 63  Resp:  [18] 18  BP: (115-152)/(52-86) 119/63  SpO2:  [90 %-98 %] 95 %  170 lbs 0 oz    Constitutional: Awake, alert. Lying on the stretcher, attempting to get OOB.   Eyes: Will not follow for EOM. Pupils ERRL.  HEENT: Non traumatic. Moist mucous membranes, normal dentition. No tongue laceration.  Respiratory: Clear to auscultation bilaterally, no crackles or wheezing.  Cardiovascular: Regular  rate and irregular rhythm, normal S1 and S2, and no murmur noted.  GI: Soft, non-distended, non-tender, bowel sounds present. No rebound tenderness or guarding.  Lymph/Hematologic: No anterior cervical or supraclavicular adenopathy.  Skin: Warm, dry. No edema.  Musculoskeletal: No gross deformities noted.  No erythema or tenderness. Moving all extremities spontaneously.   Neurologic: Awake. Can not follow commands. Disoriented x4. No tremor. Speech is non-sensical and slurred.  Moving all extremities spontaneously. May have some right sided neglect but exam is limited due to participation.   Psychiatric: Unable to assess.  Data   Data reviewed today:      As above   Imaging:   Recent Results (from the past 24 hour(s))   CT Head w/o Contrast    Narrative    CT HEAD WITHOUT CONTRAST November 28, 2022 10:23 AM    INDICATION: Confusion.    TECHNIQUE: CT scan of the head without contrast. Dose reduction  techniques were used.  CONTRAST: None.    COMPARISON: 4/6/2008 brain MRI.    FINDINGS: Small volume intraventricular extension of hemorrhage  layering in the posterior atrium of the left lateral ventricle. This  is accompanied by an 8 mm rounded hyperdensity in the medial left  temporal lobe. While this likely reflects a small focus of acute  intraparenchymal hemorrhage contributing to the above described  intraventricular extension of hemorrhage, the appearance is somewhat  atypical with absent adjacent vasogenic edema. For this reason, a  contrast enhanced brain MRI would be of benefit to evaluate for an  accompanying underlying mass lesion or vascular lesion. No additional  foci of intracranial hemorrhage elsewhere. Moderate to severe burden  scattered chronic small vessel ischemic change. Moderate diffuse  parenchymal volume loss. Ventricular size is in keeping with this  volume loss. Calvarium intact. Small air-fluid level at the posterior  left maxillary sinus. Mild mucosal thickening in the ethmoid air cells  and  right maxillary sinus. Postoperative changes to the bilateral  lenses.      Impression    IMPRESSION:   1. Small volume acute intraventricular extension of hemorrhage  layering in the posterior left atrium. This is accompanied by an 8 mm  focus of acute intraparenchymal hemorrhage in the medial left temporal  lobe without adjacent surrounding hypoattenuating vasogenic edema. As  the cause of this medial left temporal lobe intraparenchymal  hemorrhage is indeterminate, and the paucity of surrounding vasogenic  edema is somewhat atypical, contrast enhanced brain MRI is recommended  to evaluate for a possible underlying cause, to include an underlying  vascular or mass lesion, as well as the possibility of cerebral  amyloid angiopathy.  2.  Moderate to severe burden chronic small vessel ischemic changes  with a moderate diffuse parenchymal volume loss.    Findings and impression discussed with Dr. Sue by phone at 1100  hours on 11/28/2022.    TEZ BANKS MD         SYSTEM ID:  [id number]   XR Chest 2 Views    Narrative    CHEST TWO VIEWS November 28, 2022 10:36 AM     HISTORY: Altered mental status.    COMPARISON: August 17, 2022.       Impression    IMPRESSION:There are no acute infiltrates. The cardiac silhouette is  not enlarged. Pulmonary vasculature is unremarkable.    ABBE STARKS MD         SYSTEM ID:  [id number]       Recent Labs   Lab 11/28/22  0959   WBC 9.2   HGB 11.3*   MCV 98      *   POTASSIUM 4.1   CHLORIDE 99   CO2 24   BUN 28.9*   CR 1.16   ANIONGAP 12   ENEDELIA 9.5   *   ALBUMIN 3.5   PROTTOTAL 6.8   BILITOTAL 0.4   ALKPHOS 52   ALT 14   AST 21       Montse Rosa PA-C on 11/28/2022 at 1:40 PM

## 2022-11-28 NOTE — ED PROVIDER NOTES
"  History   Chief Complaint:  Increased confusion & lethargy       The history is provided by the patient, a relative and the spouse. History limited by: Dementia.      Jose Rucker is a 89 year old male on Eliquis with history of atrial fibrillation, hypertension, hyperlipidemia, and prostate cancer, who presents with increased confusion and lethargy. Per EMS the patient arrives from an independent living facility where he lives with his wife where he has recently experienced increased fatigue.    The patient's family reports that the patient appeared well on 11/24 at thanksgiving dinner but since then has experienced increased confusion and lethargy. They report episode of speaking gibberish. The patient's wife reports that this morning the patient woke up and took 3 steps before sitting down and stating \"I'm going to sit down, I think I'm dying.\" He uses a walker at baseline to ambulate. The patient has hearing aids but does not regularly wear them.    Review of Systems   Unable to perform ROS: Dementia     Allergies:  Penicillin G    Medications:  Eliquis  Lasix  Zestril  Toprol-XL  Zocor    Past Medical History:     Atrial fibrillation  Hypertension  Bradycardia  Syncope  Cardiac pacemaker in situ  Sick sinus syndrome  Primary cardiomyopathy  Carpal tunnel syndrome  Disorder of penis  Hearing loss  Heart murmur  Hyperlipidemia  Osteoarthritis of pelvic region  Palpitations  Prostate cancer  Senile cataract  Ulcer of left lower extremity with fat layer exposed     Past Surgical History:    Wound vacuum application  Cystoscopy  EP PPM generator, change dual  Hernia repair  Pacemaker implantation  Irrigation & debridement, lower extremity, left  Prostate surgery     Family History:    Mother: Heart disease    Social History:  The patient presents to the ED alone. He arrived via EMS. He lives in independent living with his wife.    Physical Exam     Patient Vitals for the past 24 hrs:   BP Temp Temp src Pulse " "Resp SpO2 Height Weight   11/28/22 1532 116/53 -- -- 72 -- 96 % -- --   11/28/22 1519 133/68 -- -- 85 -- 96 % -- --   11/28/22 1504 106/52 -- -- -- -- 97 % -- --   11/28/22 1448 (!) 142/76 -- -- 85 -- 98 % -- --   11/28/22 1422 130/55 -- -- -- -- -- -- --   11/28/22 1407 108/73 -- -- -- -- -- -- --   11/28/22 1353 -- -- -- -- -- 97 % -- --   11/28/22 1352 113/59 -- -- -- -- 96 % -- --   11/28/22 1338 -- -- -- -- -- 96 % -- --   11/28/22 1337 116/51 -- -- -- -- 97 % -- --   11/28/22 1325 -- -- -- 63 18 95 % -- --   11/28/22 1310 119/63 -- -- -- -- 94 % -- --   11/28/22 1255 128/52 -- -- -- -- 92 % -- --   11/28/22 1240 122/54 -- -- -- -- 96 % -- --   11/28/22 1225 135/85 -- -- -- -- 95 % -- --   11/28/22 1210 (!) 150/86 -- -- -- -- 98 % -- --   11/28/22 1155 115/60 -- -- -- -- 90 % -- --   11/28/22 1143 -- 97.8  F (36.6  C) Rectal -- -- -- -- --   11/28/22 1142 -- -- -- -- -- 92 % -- --   11/28/22 1127 (!) 152/60 -- -- -- -- 98 % -- --   11/28/22 1113 -- -- -- -- -- -- 1.791 m (5' 10.5\") 77.1 kg (170 lb)   11/28/22 0959 -- 97.9  F (36.6  C) Axillary -- -- -- -- --     Physical Exam  Constitutional:       Appearance: He is well-developed.   HENT:      Right Ear: External ear normal.      Left Ear: External ear normal.      Mouth/Throat:      Mouth: Mucous membranes are moist.      Pharynx: Oropharynx is clear. No oropharyngeal exudate or posterior oropharyngeal erythema.   Eyes:      General: No scleral icterus.     Extraocular Movements: Extraocular movements intact.      Conjunctiva/sclera: Conjunctivae normal.      Pupils: Pupils are equal, round, and reactive to light.   Cardiovascular:      Rate and Rhythm: Normal rate and regular rhythm.      Heart sounds: Normal heart sounds. No murmur heard.    No friction rub. No gallop.   Pulmonary:      Effort: Pulmonary effort is normal. No respiratory distress.      Breath sounds: Normal breath sounds. No wheezing or rales.   Abdominal:      General: Bowel sounds are " normal. There is no distension.      Palpations: Abdomen is soft. There is no mass.      Tenderness: There is no abdominal tenderness.   Musculoskeletal:         General: Normal range of motion.   Skin:     General: Skin is warm and dry.      Capillary Refill: Capillary refill takes less than 2 seconds.      Findings: No rash.   Neurological:      Mental Status: He is alert.      Cranial Nerves: No cranial nerve deficit.      Comments: Does not follow directions, answers to his name, mumbles when talking, SCOTT x 4           Emergency Department Course   ECG  ECG taken at 1046, ECG read at 1048  Atrial-paced rhythm with prolonged AV conduction with occasional premature ventricular complexes. Right bundle branch block. T wave abnormality, consider inferior ischemia.   Rate 62 bpm. AR interval 300 ms. QRS duration 136 ms. QT/QTc 488/495 ms. P-R-T axes * -2 -33.     Imaging:  XR Chest 2 Views   Final Result   IMPRESSION:There are no acute infiltrates. The cardiac silhouette is   not enlarged. Pulmonary vasculature is unremarkable.      ABBE STARKS MD            SYSTEM ID:  [id number]      CT Head w/o Contrast   Final Result   IMPRESSION:    1. Small volume acute intraventricular extension of hemorrhage   layering in the posterior left atrium. This is accompanied by an 8 mm   focus of acute intraparenchymal hemorrhage in the medial left temporal   lobe without adjacent surrounding hypoattenuating vasogenic edema. As   the cause of this medial left temporal lobe intraparenchymal   hemorrhage is indeterminate, and the paucity of surrounding vasogenic   edema is somewhat atypical, contrast enhanced brain MRI is recommended   to evaluate for a possible underlying cause, to include an underlying   vascular or mass lesion, as well as the possibility of cerebral   amyloid angiopathy.   2.  Moderate to severe burden chronic small vessel ischemic changes   with a moderate diffuse parenchymal volume loss.      Findings and  impression discussed with Dr. Sue by phone at 1100   hours on 11/28/2022.      TEZ BANKS MD            SYSTEM ID:  [id number]      CT Head w/o Contrast    (Results Pending)     Report per radiology    Laboratory:  Labs Ordered and Resulted from Time of ED Arrival to Time of ED Departure   COMPREHENSIVE METABOLIC PANEL - Abnormal       Result Value    Sodium 135 (*)     Potassium 4.1      Chloride 99      Carbon Dioxide (CO2) 24      Anion Gap 12      Urea Nitrogen 28.9 (*)     Creatinine 1.16      Calcium 9.5      Glucose 119 (*)     Alkaline Phosphatase 52      AST 21      ALT 14      Protein Total 6.8      Albumin 3.5      Bilirubin Total 0.4      GFR Estimate 59 (*)    TROPONIN T, HIGH SENSITIVITY - Abnormal    Troponin T, High Sensitivity 60 (*)    ROUTINE UA WITH MICROSCOPIC REFLEX TO CULTURE - Abnormal    Color Urine Light Yellow      Appearance Urine Clear      Glucose Urine Negative      Bilirubin Urine Negative      Ketones Urine Negative      Specific Gravity Urine 1.018      Blood Urine Trace (*)     pH Urine 6.5      Protein Albumin Urine 20 (*)     Urobilinogen Urine Normal      Nitrite Urine Negative      Leukocyte Esterase Urine Negative      Mucus Urine Present (*)     RBC Urine 2      WBC Urine 1      Hyaline Casts Urine 1     CBC WITH PLATELETS AND DIFFERENTIAL - Abnormal    WBC Count 9.2      RBC Count 3.67 (*)     Hemoglobin 11.3 (*)     Hematocrit 36.0 (*)     MCV 98      MCH 30.8      MCHC 31.4 (*)     RDW 12.8      Platelet Count 262      % Neutrophils 70      % Lymphocytes 19      % Monocytes 10      % Eosinophils 1      % Basophils 0      % Immature Granulocytes 0      NRBCs per 100 WBC 0      Absolute Neutrophils 6.4      Absolute Lymphocytes 1.7      Absolute Monocytes 0.9      Absolute Eosinophils 0.1      Absolute Basophils 0.0      Absolute Immature Granulocytes 0.0      Absolute NRBCs 0.0     LACTIC ACID WHOLE BLOOD - Normal    Lactic Acid 1.0     MAGNESIUM - Normal    Magnesium  2.1     TSH WITH FREE T4 REFLEX - Normal    TSH 0.77     INFLUENZA A/B & SARS-COV2 PCR MULTIPLEX - Normal    Influenza A PCR Negative      Influenza B PCR Negative      RSV PCR Negative      SARS CoV2 PCR Negative          Emergency Department Course:       Reviewed:  I reviewed nursing notes, vitals, past medical history and Care Everywhere    Assessments:  0930 I obtained history and examined the patient as noted above.   1103 I rechecked the patient and explained findings.   1403 I rechecked the patient and explained findings and the plan for further care.    Consults:  1100 I spoke one the phone with Radiology.  1134 I spoke on the phone with Adriana Stafford NP, Neurosurgery.  1224 I spoke in person with Adriana Stafford NP, Neurosurgery, who state that the patient's intercranial bleed in likely not the cause of his new symptoms.   1329 I spoke on the phone with Montse Rosa PA-C, Hospitalist service.  1355 I again spoke on the phone with Montse Hendrix PA-C Hospitalist service.  1413 I again spoke on the phone with Montse Hendrix PA-C who is accepting for Dr. Evans, Hospitalist.    Interventions:  0959 NS, 1 L, IV.  1433 Ativan, 0.5 mg, IV.  1454 Haldol, 4 mg, IM.    Disposition:  The patient was admitted to the hospital under the care of Dr. Evans.     Impression & Plan     Medical Decision Making:  Patient presents today for evaluation of above complaints.  He was found to be altered on exam and alternate between sleeping to awake.  He does not follow any particular commands or directions.  He is very hard of hearing but even with shouting he is unable to answer our questions. CT is showing a small intraparenchymal hemorrhage.  Labs did not show significant findings.  I consulted neurosurgery.  They did evaluate patient in the ER.  Patient is nonsurgical.  They recommended repeat CT in 6 hours.  They do not think that is the cause of his altered mental status.  They recommend neurology follow-up.  The MRI  at [facility] is not compatible with his pacemaker.  To get an MRI with his pacemaker, he will need to be at [facility].  There are no beds to admit him at [facility].  We did discuss all this with his family, and they do not want any aggressive treatments. They are aware of the MRI issue and that there is no bed at [facility]. They are comfortable with admission at [facility] with a neurology consult. The hospitalist team has discussed the goal of care with family. Patient is DNR/DNI. He will be admitted to inpatient team for further management.    Diagnosis:    ICD-10-CM    1. Altered mental status, unspecified altered mental status type  R41.82       2. Intraparenchymal hemorrhage of brain (H)  I61.9         Scribe Disclosure:  James SHER, am serving as a scribe at 9:30 AM on 11/28/2022 to document services personally performed by Govind Sue MD based on my observations and the provider's statements to me.        Govind Sue MD  11/28/22 2547

## 2022-11-29 NOTE — PROGRESS NOTES
SPIRITUAL HEALTH SERVICES Progress Note  [facility] ED    Referral Source: Palliative Care team consultation request for family support. Pt is Anabaptist.    Pt is resting and periodically murmuring and moving legs about.  Pt spouse, Shahla and daughter, Dilcia are at the bedside. Shahla and Jose have a son living in [place].   The couple are members at [facility] of the [employer] in [place] and their [place]  anointed Mr. Rucker yesterday.     Shahla is anticipating that her  will be admitted to a medical unit.  They welcomed a time of prayer.  Family is oriented to Spirit[facility] Services for support during hospital stay.    Plan: [facility] remains available for additional emotional/spiritual support.    Abdoulaye Woods MA  Staff   Pager: [phone] *[phone]

## 2022-11-29 NOTE — PROGRESS NOTES
[facility]    Neurosurgery  Daily Note    Assessment & Plan   Jose Rucker is a 89 year old male with history of prostate cancer, dementia, pacemaker, and atrial fibrillation on Eliquis, who was admitted on 11/28/2022 with confusion and lethargy since 11/24/22 with imaging evidence of small volume acute intraventricular extension of hemorrhage layering in the posterior left atrium, accompanied by an 8 mm focus of acute intraparenchymal hemorrhage in the medial left temporal lobe. Rpt head CT last night as demonstrated below. Rpt head CT this AM stable.     AM ROUNDS- Spouse and daughter at bedside. They note patient has been unchanged in terms of confusion. They did not make it noted to hospitalist and to me on rounds that under no circumstances would they wish for surgical intervention or any aggressive interventions.       EXAM: CT HEAD W/O CONTRAST  LOCATION: [facility]  DATE/TIME: 11/28/2022 5:18 PM                                                                IMPRESSION:  1.  Small amount of intraventricular blood products at the left occipital horn. Suspected source includes 1 cm foraminal hematoma at the medial left temporal lobe.  2.  Cerebral volume loss and presumed chronic small vessel ischemic disease.     CT HEAD WITHOUT CONTRAST November 28, 2022 10:23 AM  IMPRESSION:   1. Small volume acute intraventricular extension of hemorrhage  layering in the posterior left atrium. This is accompanied by an 8 mm  focus of acute intraparenchymal hemorrhage in the medial left temporal  lobe without adjacent surrounding hypoattenuating vasogenic edema. As  the cause of this medial left temporal lobe intraparenchymal  hemorrhage is indeterminate, and the paucity of surrounding vasogenic  edema is somewhat atypical, contrast enhanced brain MRI is recommended  to evaluate for a possible underlying cause, to include an underlying  vascular or mass lesion, as well as  the possibility of cerebral  amyloid angiopathy.  2.  Moderate to severe burden chronic small vessel ischemic changes  with a moderate diffuse parenchymal volume loss.     Plan:  -Patient's family would not wish for any surgical intervention or aggressive intervention at this time. Therefore, our team will sign off at this time.   -Continue supportive and symptomatic treatment  -Pain control measures  -Dr Bell in agreement with plans     Va ESCALERA [address]  [place] [address]    Tel [phone]  Pager [phone]    Principal Problem:    Intraparenchymal hemorrhage of brain (H)  Active Problems:    Altered mental status, unspecified altered mental status type      Va Moreira PA-C    Interval History   Unchanged     Physical Exam   Temp: 97  F (36.1  C) Temp src: Temporal BP: (!) 141/85 Pulse: 70   Resp: 20 SpO2: 97 % O2 Device: None (Room air)    Vitals:    11/28/22 1113   Weight: 170 lb (77.1 kg)     Vital Signs with Ranges  Temp:  [97  F (36.1  C)-97.8  F (36.6  C)] 97  F (36.1  C)  Pulse:  [63-85] 70  Resp:  [18-20] 20  BP: (106-152)/(51-86) 141/85  SpO2:  [90 %-99 %] 97 %  I/O last 3 completed shifts:  In: 1000 [IV Piggyback:1000]  Out: 400 [Urine:400]    Lethargic, confused, not opening eyes on command or spontaneously   Pupils react   Moves BUE on command   Does not move BLE on command   Moves BLE to sitmuli   Negative clonus     Medications     dextrose 5% and 0.45% NaCl + KCl 20 mEq/L 100 mL/hr at 11/29/22 0841        - MEDICATION INSTRUCTIONS -   Does not apply See Admin Instructions     metoprolol succinate ER  25 mg Oral BID     sodium chloride (PF)  3 mL Intracatheter Q8H       Plans discussed with Dr. Bell who was in agreement with chema ESCALERA [address]  [place] [address]    Tel [phone]  Pager  [phone]

## 2022-11-29 NOTE — CONSULTS
[facility]  Palliative Care Consultation   Text Page    Assessment & Plan   Jose Rucker is a 89 year old male who was admitted on 11/28/2022.   Consulted by Dr. Duarte to assist with goals of care and development of plan of care    Recommendations:  1. Goals of Care- No CPR- Do NOT Intubate  Hospitalization goals discussed with patient, spouse Shahla and daughter Dilcia.  Goal to assess work up in the coming days and meet for family meeting on Thursday.  Decisional Capacity- Unreliable. Patient does not have an advance directive. Per  informed consent policy next of kin should be involved in all consent and decision making.  POLST none on file, recommend completion prior to discharge    2. Encephalopathy, with behavioral component  Baseline confusion due to dementia, now likely exacerbated by intracranial hemorrhage. Agitated, reaching for things, delirious and attempting to get out of bed.  - Zyprexa ODT 2.5 mg every 12 hours  - haloperidol 2 mg every 6 hours prn agitation  - ativan IV/PO 0.5 - 1 mg every 4 hours prn  - consider  if medications are ineffective to maintain safety.    3. Poor PO intake  Patient is not safe for PO intake due to encephalopathy.  - NPO per primary service.    - ODT tablets, IV formulations of medications reasonable.    4. Generalized weakness  Patient with altered mental status and unable to mobilize due to lethargy and ataxic movements.  - bedrest, assess for readiness to mobilize with therapy once patient is able    5. Spiritual Care  Oriented to Spiritual Health as part of Palliative Care team. Appreciate Care of Chaplain Abdoulaye Woods  Spiritual Background: Gnosticist  Patient's  from [facility] was at the bedside on 11/28 and provided anointment per spouse/daughter's report.    5. Care Planning  Appreciate Care of Interdisciplinary team.  Disposition plan pending.    Medical Decision Making and Goals of Care:  Discussed on  "November 29, 2022 with Aruna Palumbo, JENNY CNP:   Met with Genaro, Shahla and Dilcia at the bedside in the emergency department.  Genaro was very disoriented, mumbling, does not attend to discussion, agitated and attempting to get out of bed despite redirection.      Introduced palliative care and reason for consult.  Dilcia expressed that they are uncertain about \"what happens next\".  She and Shahla outlined Genaro's baseline neuro status and his dementia.  Shahla stated that she helps Genaro with \"quite a lot\" at home, but that he has been safe at their residence to this point.  Dilcia asked about the plan of care and what they should expect.  Reviewed work up thus far and stable ICH.  Reviewed common causes of delirium in the patient with dementia and the expected interventions for safety and comfort.  Discussed plan for admission to the hospital and further work up and monitoring.  They verbalized understanding.      Discussed the work to manage reversible causes of his confusion as part of his initial management plan.  Outlined the need for ongoing discussions regarding goals of care should his status not return to a reasonable place based on his goals for quality of life. Shahla and Dilcia verbalized understanding of this and Shahla stated that his independence had been important to him.     Reviewed his history including [facility], career, family.  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  Reflected on his tradition of making arvind cookies and their family traditions.     Suggested plan to follow up daily with Genaro's status and meet Thursday 12/1 for further goals of care discussion.  Dilcia inquired if hospice would be a treatment option for Genaro.  Discussed the possibility that hospice would be in line with his goals, but that it would be reasonable to see how his status changes in the coming 1-2 days.    Thank you for involving us in the patient's care.     Aruna Palumbo, " "APRN CNP  Pain Management and Palliative Care  [facility]  Pgr: [phone]    Time Spent on this Encounter   Total unit/floor time 110 minutes, time consisted of the following, examination of the patient, reviewing the record and completing documentation. >50% of time spent in counseling and coordination of care, Bedside Nurse Laquita and Hospitalist Gerald.  Time spend counseling with patient and family consisted of the following topics, goals of care and symptom management.    Understanding of disease process:   This has been discussed with family and primary team.    History of Present Illness   History is obtained from the electronic health record and patient's family    Jose Rucker is a 89 year old male with a past medical history of atrial fibrillation on anticoagulation, bradycardia s/p ppm, prostate CA, cognitive impairment who presents with four day history of altered mental status, lethargy and progression to severe confusion and mumbling speech.  Per family report, patient was in his usual state of health on Thanksgiving and spouse began to notice increased confusion and lethargy at home on 11/25.  On the day of presentation patient stated \"I'm going to sit down, I think I'm dying\".  EMS was contacted when he slumped into a chair.  Work up in the ED revealed IPH of the left temporal lobe and small amount of intraventricular extension.  He was admitted for further work up and management.    This is in the setting of no history of ICH or seizures.  Baseline residence at home with his wife. Use of walker for mobilization and required assistance with complicated ADLs.  He has presented once to an Urgency room for fall and knee pain in the past 12 months. There is not reported or documented decline in patient's function.  There is no reported or documented weight loss.       Past Medical History   I have reviewed this patient's medical history and updated it with pertinent " information if needed.   Past Medical History:   Diagnosis Date     A-fib (H)      Bradycardia     PPM 5/27/2008     Cardiac pacemaker in situ 9/17/2014 2008 PPM Medtronic Sensia, model# SEDR01, serial# [id number]       Hearing loss      Heart murmur 5/23/2007    Overview:  Benign Echo     Hernia, abdominal      HTN (hypertension)      Hyperlipidemia with target LDL less than 130 5/23/2007    Overview:  ICD 10     Irregular heart beat     AFIB     Osteoarthritis of pelvic region 11/12/2008    Overview:  LW Modifier:  Rt, SHARON done 1/15/09 LW Onset:  5-6 years ; DJD Hip     Pacemaker      Palpitations      Primary cardiomyopathy (H) 11/4/2014     Problem list name updated by automated process. Provider to review     Prostate cancer (H) 6/26/2007    Overview:  [place] Score:5+5=1 Radiation Therapy x 25 + Radioactive Seed Implant     Sick sinus syndrome (H)      Syncope      Syncope        Past Surgical History   I have reviewed this patient's surgical history and updated it with pertinent information if needed.  Past Surgical History:   Procedure Laterality Date     APPLY WOUND VAC Left 7/16/2020    Procedure: WOUND VAC PLACEMENT;  Surgeon: Phil Orellana MD;  Location: [facility] OR     CYSTOSCOPY       EP PPM GENERATOR CHANGE DUAL  2/10/16     HERNIA REPAIR       IMPLANT PACEMAKER  5/7/08    Medtronic Sensia, model# SEDR01, serial# [id number]     IRRIGATION AND DEBRIDEMENT LOWER EXTREMITY, COMBINED Left 7/16/2020    Procedure: IRRIGATION AND DEBRIDEMENT LEFT LEG WOUND;  Surgeon: Phil Orellana MD;  Location: [facility] OR     PROSTATE SURGERY         Prior to Admission Medications   Prior to Admission Medications   Prescriptions Last Dose Informant Patient Reported? Taking?   Diphenhydramine-APAP, sleep, (TYLENOL PM EXTRA STRENGTH PO) 11/27/2022  Yes Yes   Sig: Take 2 capsules by mouth At Bedtime   Multiple Minerals-Vitamins (PROSTEON PO) 11/27/2022  Yes Yes   Sig: Take 500 mg by mouth 2 tablets twice daily    apixaban  ANTICOAGULANT (ELIQUIS) 5 MG tablet 11/27/2022  No Yes   Sig: Take 1 tablet (5 mg) by mouth 2 times daily   furosemide (LASIX) 20 MG tablet 11/27/2022  No Yes   Sig: Take 1 tablet (20 mg) by mouth daily   lisinopril (ZESTRIL) 2.5 MG tablet 11/27/2022  No Yes   Sig: Take 1 tablet (2.5 mg) by mouth daily   metoprolol succinate ER (TOPROL XL) 25 MG 24 hr tablet 11/27/2022  No Yes   Sig: Take 1 tablet (25 mg) by mouth 2 times daily   simvastatin (ZOCOR) 20 MG tablet 11/27/2022  No Yes   Sig: Take 1 tablet (20 mg) by mouth At Bedtime      Facility-Administered Medications: None     Allergies   Allergies   Allergen Reactions     Penicillin G Rash     And patient had an awful smell        Social History   I have updated and reviewed the following Social History Narrative:   Social History     Social History Narrative     Not on file           Living situation: home with spouse       Support system: spouse, adult children       Actual/Potential Caregiver: spouse       Functional status: assistance with walker for mobility. Assistance/cueing for complex ADLs  History of substance use/abuse: no    Family History   I have reviewed this patient's family history and updated it with pertinent information if needed.   Family History   Problem Relation Age of Onset     Heart Disease Mother        Review of Systems   Review of systems not obtained due to patient factors - confusion    Physical Exam   Temp:  [97  F (36.1  C)-97.8  F (36.6  C)] 97  F (36.1  C)  Pulse:  [63-85] 70  Resp:  [18-24] 24  BP: (106-150)/(51-86) 141/85  SpO2:  [90 %-99 %] 97 %  170 lbs 0 oz  Exam:  GEN:  Confused, mumbling speech, restless/agitated.  HEENT:  Normocephalic/atraumatic, no scleral icterus, no nasal discharge, mouth moist.  CV:  RRR, S1, S2  +3 DP/PT pulses bilatererally; no edema BLE.  RESP:  Clear to auscultation bilaterally without rales/rhonchi/wheezing/retractions.  Symmetric chest rise on inhalation noted.  Normal respiratory effort.  ABD:   Rounded, soft, non-tender/non-distended.  +BS  EXT:  Edema & pulses as noted above.  CMS intact x 4.      SKIN:  Dry to touch, no exanthems noted in the visualized areas.    NEURO: Confused, agitated, unable to participate in assessment.  Moves all extremities spontaneously.    PAIN BEHAVIOR: Cooperative  Psych:  Confused, agitated    Delirium Screen/CAM:  Delirium = (#1 and #2 = YES) + (#3 and/or #4)   1) Acute onset and fluctuating course:   YES   (acute change in mental status from baseline over last 24 hours)  2) Inattention:   YES   (difficulty focusing, distractible, can't follow conversation)  3) Disorganized thinking:   YES   (score only if #1 and #2 are YES)  (rambling/irrelevant conversation, unclear/illogical thoughts, inconsistency)  4) Altered level of consciousness:   YES   (score only if #1 and #2 are YES)  (other than alert, calm, cooperative)    Delirium/CAM score: 4/4  Interpretation:  1)  Delirium:  Present  2)  Type:  hyperactive  3)  Severity:  severe    Data   Results for orders placed or performed during the hospital encounter of 11/28/22 (from the past 24 hour(s))   CT Head w/o Contrast   Result Value Ref Range    Radiologist flags Acute intracranial hemorrhage (AA)     Narrative    EXAM: CT HEAD W/O CONTRAST  LOCATION: [facility]  DATE/TIME: 11/28/2022 5:18 PM    INDICATION: ams, head bleed, on blood thinners  COMPARISON: MRI brain 04/06/2008  TECHNIQUE: Routine CT Head without IV contrast. Multiplanar reformats. Dose reduction techniques were used.    FINDINGS:  INTRACRANIAL CONTENTS: Question 1 cm focal parenchymal hemorrhage at the medial left temporal lobe series 3 image 12. Small amount of blood products noted layering within the left occipital horn. No extraaxial collection, or mass effect.  No CT evidence   of acute infarct. Moderate presumed chronic small vessel ischemic changes. Moderate generalized volume loss. No hydrocephalus. There is intracranial  atherosclerosis.     VISUALIZED ORBITS/SINUSES/MASTOIDS: Prior bilateral cataract surgery. Visualized portions of the orbits are otherwise unremarkable. No paranasal sinus mucosal disease. No middle ear or mastoid effusion.    BONES/SOFT TISSUES: No acute abnormality.      Impression    IMPRESSION:  1.  Small amount of intraventricular blood products at the left occipital horn. Suspected source includes 1 cm foraminal hematoma at the medial left temporal lobe.  2.  Cerebral volume loss and presumed chronic small vessel ischemic disease.      [Critical Result: Acute intracranial hemorrhage]    Finding was identified on 11/28/2022 5:19 PM.     1.  Dr. Grace was contacted by me on 11/28/2022 5:39 PM and verbalized understanding of the critical result.    CT Head w/o Contrast    Narrative    EXAM: CT HEAD W/O CONTRAST  LOCATION: [facility]  DATE/TIME: 11/29/2022 6:24 AM    INDICATION: Hematoma follow-up.  COMPARISON: 11/28/2022.  TECHNIQUE: Routine CT Head without IV contrast. Multiplanar reformats. Dose reduction techniques were used.    FINDINGS:  INTRACRANIAL CONTENTS: Stable small-volume intraventricular hemorrhage layering within the occipital horn of the left lateral ventricle. Stable focus of intraparenchymal hemorrhage centered within the medial aspect of the left temporal lobe measuring 8   mm. The ventricular caliber remains stable. No new hemorrhage. No midline shift or mass effect. No CT evidence of acute infarct. Moderate to severe presumed chronic small vessel ischemic changes. Mild to moderate generalized volume loss. No   hydrocephalus.     VISUALIZED ORBITS/SINUSES/MASTOIDS: Prior bilateral cataract surgery. Visualized portions of the orbits are otherwise unremarkable. Mild mucosal thickening scattered about the paranasal sinuses. No middle ear or mastoid effusion.    BONES/SOFT TISSUES: No acute osseous abnormality. Bilateral temporomandibular joint osteoarthritis.      Impression     IMPRESSION:  1.  No significant interval change.  2.  Stable small focus of intraparenchymal hemorrhage within the medial aspect of the left temporal lobe.  3.  Stable trace amount of intraventricular hemorrhage layering within the occipital horn of the left lateral ventricle.  4.  Stable ventricular caliber.                     Basic metabolic panel   Result Value Ref Range    Sodium 134 (L) 136 - 145 mmol/L    Potassium 4.2 3.4 - 5.3 mmol/L    Chloride 101 98 - 107 mmol/L    Carbon Dioxide (CO2) 22 22 - 29 mmol/L    Anion Gap 11 7 - 15 mmol/L    Urea Nitrogen 18.4 8.0 - 23.0 mg/dL    Creatinine 0.92 0.67 - 1.17 mg/dL    Calcium 9.0 8.2 - 9.6 mg/dL    Glucose 133 (H) 70 - 99 mg/dL    GFR Estimate 79 >60 mL/min/1.73m2   CBC with platelets   Result Value Ref Range    WBC Count 8.5 4.0 - 11.0 10e3/uL    RBC Count 3.77 (L) 4.40 - 5.90 10e6/uL    Hemoglobin 11.5 (L) 13.3 - 17.7 g/dL    Hematocrit 36.9 (L) 40.0 - 53.0 %    MCV 98 78 - 100 fL    MCH 30.5 26.5 - 33.0 pg    MCHC 31.2 (L) 31.5 - 36.5 g/dL    RDW 12.8 10.0 - 15.0 %    Platelet Count 253 150 - 450 10e3/uL

## 2022-11-29 NOTE — ED NOTES
Daughter at bedside. Increased agitation per daughter. Pt is given haldol. Pt is boosted and turned onto right side with pillows. Tolerated well. VSS, on continuous pulse ox. Will continue to monitor.

## 2022-11-29 NOTE — ED NOTES
2mg haldol did not relieve agitation.  Patient actively trying to get out of bed, pulling gown off.

## 2022-11-29 NOTE — PROGRESS NOTES
[facility]  Hospitalist Progress Note  Name: Jose Rucker    MRN: [id number]  Physician:  Raji Duarte DO, FHM (Text Page)  Securely message with the Vocera Web Console (learn more here)    Summary of Stay:  Mr. Rucker is a 89 year old male with dementia whose mental status declined rather abruptly just prior to admission.  Found to have an ICH.      Assessment & Plan    ICH, occurred in the setting of apixaban:  -  Family has been considering comfort approach and had wanted to see how the night last night went.  I met with family earlier today and Palliative care met with them.  They at this time as he hasn't continued to worsen would prefer basic non-comfort stroke/hemorrhage care approach at least for a day or two to see how he does, however if he worsens they would favor full move to comfort focus.  Given this, neurology and stroke neuro consulted later today.  Neurosurgery also following and does not feel surgery indicated.  CT imaging stable this AM compared to prior.  -  Continue neuro checks  -  Check bladder scans for retention  -  EEG planned, will defer MRI consideration to stroke neuro  -  Had been on some dextrose IVF earlier for low glu but given neuro issues will try to avoid using further dextrose fluids and change to 0.45 fluids.  Continue glu monitoring, treat with intermittent glucose if drops glucose.  -  As not really taking oral, will use IV lopressor.  If BP remains high will add vasotec IV to sub for his oral ACEI.   Hydralazine PRN also available.  Goal SBP < 160.  -  Speech consulted, holding on PT/OT given confusion/agitation today    Metabolic encephalopathy exacerbated by above  Dementia:  -  More confused today, at times fairly agitated.  Had some haldol earlier which helped to a degree.   Palliative has started zyprexa scheduled.    PAF  Cardiomyopathy  SSS s/p PPM:  -  DOAC held  - IV Toprol as above    Prostate Cancer :   S/P Combined hormonal therapy  and radiotherapy in 2008.  His last hormone injection was in 2010.  Patient's PSA has remained undetectable at <0.01.          COVID Status:  COVID-19 PCR Results    COVID-19 PCR Results 11/28/22   SARS CoV2 PCR Negative      Comments are available for some flowsheets but are not being displayed.         COVID-19 Antibody Results, Testing for Immunity    COVID-19 Antibody Results, Testing for Immunity   No data to display.            Diet: NPO for Medical/Clinical Reasons Except for: Meds, Ice Chips    DVT Prophylaxis: Pneumatic Compression Devices  Rojas Catheter: Not present    Cardiac Monitoring: None    Code Status: No CPR- Do NOT Intubate        Disposition Plan   Expect 2+ more nights in the hospital.     Entered: Raji Duarte, DO 11/29/2022, 11:20 AM       Interval History   Assumed care today, history reviewed.  Patient doesn't consistently answer questions.  Family present feel he varies in levels of agitation and they have to frequently try to calm him.    -Data reviewed today: I reviewed all new labs and imaging reports over the last 24 hours. I personally reviewed no images or EKG's today.    Physical Exam   Temp: 97  F (36.1  C) Temp src: Temporal BP: (!) 141/85 Pulse: 70   Resp: 24 SpO2: 97 % O2 Device: None (Room air)    Vitals:    11/28/22 1113   Weight: 77.1 kg (170 lb)     Vital Signs with Ranges  Temp:  [97  F (36.1  C)-97.8  F (36.6  C)] 97  F (36.1  C)  Pulse:  [63-85] 70  Resp:  [18-24] 24  BP: (106-152)/(51-86) 141/85  SpO2:  [90 %-99 %] 97 %  I/O last 3 completed shifts:  In: 1000 [IV Piggyback:1000]  Out: 400 [Urine:400]    GEN:  Awakened to name, looked at me.  Didn't really answer questions well.    HEENT:  Normocephalic/atraumatic, no scleral icterus, no nasal discharge, mouth moist.  CV:  Regular rate and rhythm, no loud murmur/rub.  LUNGS:  Clear to auscultation anterior/laterally.  Mildly decreased breath sounds bases.  Symmetric chest rise on inhalation noted.  ABD:  Active bowel  sounds, soft, non-tender/non-distended.  No guarding/rigidity.  EXT:  Trace LE edema.  No cyanosis.  No acute joint synovitis noted.  SKIN:  Dry to touch, no exanthems noted in the visualized areas.    Medications     dextrose 5% and 0.45% NaCl + KCl 20 mEq/L 100 mL/hr at 11/29/22 0841       - MEDICATION INSTRUCTIONS -   Does not apply See Admin Instructions     metoprolol  5 mg Intravenous Q6H     OLANZapine zydis  2.5 mg Oral BID     sodium chloride (PF)  3 mL Intracatheter Q8H     Data     Recent Labs   Lab 11/29/22  0801 11/28/22  0959   WBC 8.5 9.2   HGB 11.5* 11.3*   HCT 36.9* 36.0*   MCV 98 98    262     7-Day Micro Results     Collected Updated Procedure Result Status      11/28/2022 1113 11/28/2022 1204 Symptomatic; Unknown Influenza A/B & SARS-CoV2 (COVID-19) Virus PCR Multiplex Nasopharyngeal [[id number]]    Swab from Nasopharyngeal    Final result Component Value   Influenza A PCR Negative   Influenza B PCR Negative   RSV PCR Negative   SARS CoV2 PCR Negative   NEGATIVE: SARS-CoV-2 (COVID-19) RNA not detected, presumed negative.                Recent Labs   Lab 11/29/22  0801 11/28/22  0959   * 135*   POTASSIUM 4.2 4.1   CHLORIDE 101 99   CO2 22 24   ANIONGAP 11 12   * 119*   BUN 18.4 28.9*   CR 0.92 1.16   GFRESTIMATED 79 59*   ENEDELIA 9.0 9.5   MAG  --  2.1   PROTTOTAL  --  6.8   ALBUMIN  --  3.5   BILITOTAL  --  0.4   ALKPHOS  --  52   AST  --  21   ALT  --  14       Recent Results (from the past 24 hour(s))   CT Head w/o Contrast   Result Value    Radiologist flags Acute intracranial hemorrhage (AA)    Narrative    EXAM: CT HEAD W/O CONTRAST  LOCATION: [facility]  DATE/TIME: 11/28/2022 5:18 PM    INDICATION: ams, head bleed, on blood thinners  COMPARISON: MRI brain 04/06/2008  TECHNIQUE: Routine CT Head without IV contrast. Multiplanar reformats. Dose reduction techniques were used.    FINDINGS:  INTRACRANIAL CONTENTS: Question 1 cm focal parenchymal  hemorrhage at the medial left temporal lobe series 3 image 12. Small amount of blood products noted layering within the left occipital horn. No extraaxial collection, or mass effect.  No CT evidence   of acute infarct. Moderate presumed chronic small vessel ischemic changes. Moderate generalized volume loss. No hydrocephalus. There is intracranial atherosclerosis.     VISUALIZED ORBITS/SINUSES/MASTOIDS: Prior bilateral cataract surgery. Visualized portions of the orbits are otherwise unremarkable. No paranasal sinus mucosal disease. No middle ear or mastoid effusion.    BONES/SOFT TISSUES: No acute abnormality.      Impression    IMPRESSION:  1.  Small amount of intraventricular blood products at the left occipital horn. Suspected source includes 1 cm foraminal hematoma at the medial left temporal lobe.  2.  Cerebral volume loss and presumed chronic small vessel ischemic disease.      [Critical Result: Acute intracranial hemorrhage]    Finding was identified on 11/28/2022 5:19 PM.     1.  Dr. Grace was contacted by me on 11/28/2022 5:39 PM and verbalized understanding of the critical result.    CT Head w/o Contrast    Narrative    EXAM: CT HEAD W/O CONTRAST  LOCATION: [facility]  DATE/TIME: 11/29/2022 6:24 AM    INDICATION: Hematoma follow-up.  COMPARISON: 11/28/2022.  TECHNIQUE: Routine CT Head without IV contrast. Multiplanar reformats. Dose reduction techniques were used.    FINDINGS:  INTRACRANIAL CONTENTS: Stable small-volume intraventricular hemorrhage layering within the occipital horn of the left lateral ventricle. Stable focus of intraparenchymal hemorrhage centered within the medial aspect of the left temporal lobe measuring 8   mm. The ventricular caliber remains stable. No new hemorrhage. No midline shift or mass effect. No CT evidence of acute infarct. Moderate to severe presumed chronic small vessel ischemic changes. Mild to moderate generalized volume loss. No   hydrocephalus.      VISUALIZED ORBITS/SINUSES/MASTOIDS: Prior bilateral cataract surgery. Visualized portions of the orbits are otherwise unremarkable. Mild mucosal thickening scattered about the paranasal sinuses. No middle ear or mastoid effusion.    BONES/SOFT TISSUES: No acute osseous abnormality. Bilateral temporomandibular joint osteoarthritis.      Impression    IMPRESSION:  1.  No significant interval change.  2.  Stable small focus of intraparenchymal hemorrhage within the medial aspect of the left temporal lobe.  3.  Stable trace amount of intraventricular hemorrhage layering within the occipital horn of the left lateral ventricle.  4.  Stable ventricular caliber.

## 2022-11-29 NOTE — PLAN OF CARE
Goal Outcome Evaluation: Not progressing.    POC reviewed with patient and daughter.    Pertinent Assessments: Confused, lethargic this morning followed by restlessness and agitation this afternoon. Does not follow direction, responds to pain and touch. No clear words. PAINAD scale. Bedrest. Voids via external cath.   Major Shift Events: Ativan x1.   Treatment Plan: Neurology consult. Palliative consult. Neuro checks q 4 hours. IVF. NPO, ST to see when appropriate. Bladder scan protocol per order.

## 2022-11-29 NOTE — CONSULTS
"HOSPITAL NEUROLOGY        History of the Present Illness:    89 year old male presented to the ED 11/28/2022 with confusion and lethargy for the previous 3 days.  A small area of left temporal IPH with some IV extension was found on imaging.  He was evaluated by neurosurgery and Eliquis is being held.  Repeat scan this morning was stable.  The family told the neurosurgical service they did not want to pursue any surgical intervention.       His wife and daughter are at baseline    He was diagnosed with dementia about 4 years ago, but normally speaks coherently (except for limitations posed by his severe hearing loss).  He was at baseline during Thanksgiving.  But the next morning he seemed very tired.  He slept for most of the next 2 days.  Then, 2 days ago, he was more awake but constantly mumbling incoherently.  By yesterday, he was \"totally crazy,\" more agitated, difficult to understand except when he got up and repeated, \"I feel like I'm dying.\"  He sat down in a chair but then seemed to be sliding himself off of it.  This is what prompted his family to take him to the emergency room.      Review of systems:  Constitutional: As mentioned above.   Eyes: No blurred vision, eye congestion, ptosis, miosis, or diplopia  ENT: +hearing loss at baseline  No tinnitus, no rhinorrhea  Cardiovascular: No chest pain or palpitation  Respiratory:  +coughing in morning is baseline No shortness of breath  Gastrointestinal: No abdominal pain, diarrhea or constipation  Genitourinary: +incontinence for the last 2 years  Musculoskeletal: No muscle ache, neck pain, back pain, arthritis  Skin: No rash  Neurological: As mentioned above.  Psychiatric: No anxiety, depression   All other systems negative or as noted in the history of the present illness.     Past Medical History:   Diagnosis Date     A-fib (H)      Bradycardia     PPM 5/27/2008     Cardiac pacemaker in situ 9/17/2014 2008 PPM Medtronic Sensia, model# SEDR01, serial# " [id number]       Hearing loss      Heart murmur 2007    Overview:  Benign Echo     Hernia, abdominal      HTN (hypertension)      Hyperlipidemia with target LDL less than 130 2007    Overview:  ICD 10     Irregular heart beat     AFIB     Osteoarthritis of pelvic region 2008    Overview:  LW Modifier:  Rt, SHARON done 1/15/09 LW Onset:  5-6 years ; DJD Hip     Pacemaker      Palpitations      Primary cardiomyopathy (H) 2014     Problem list name updated by automated process. Provider to review     Prostate cancer (H) 2007    Overview:  Mekhi Score:5+5=1 Radiation Therapy x 25 + Radioactive Seed Implant     Sick sinus syndrome (H)      Syncope      Syncope         Past Surgical History:   Procedure Laterality Date     APPLY WOUND VAC Left 2020    Procedure: WOUND VAC PLACEMENT;  Surgeon: Phil Orellana MD;  Location:  OR     CYSTOSCOPY       EP PPM GENERATOR CHANGE DUAL  2/10/16     HERNIA REPAIR       IMPLANT PACEMAKER  08    Medtronic Sensia, model# SEDR01, serial# [id number]     IRRIGATION AND DEBRIDEMENT LOWER EXTREMITY, COMBINED Left 2020    Procedure: IRRIGATION AND DEBRIDEMENT LEFT LEG WOUND;  Surgeon: Phil Orellana MD;  Location:  OR     PROSTATE SURGERY        Social History     Tobacco Use     Smoking status: Former     Packs/day: 0.50     Years: 6.00     Pack years: 3.00     Types: Cigarettes     Quit date:      Years since quittin.9     Smokeless tobacco: Never   Substance Use Topics     Alcohol use: Yes     Comment: 1 beer daily     Drug use: Never        Family History   Problem Relation Age of Onset     Heart Disease Mother           Current Facility-Administered Medications:      acetaminophen (TYLENOL) tablet 650 mg, 650 mg, Oral, Q6H PRN **OR** acetaminophen (TYLENOL) Suppository 650 mg, 650 mg, Rectal, Q6H PRN, Montse Rosa PA-C     Contraindications to both pharmacological and mechanical prophylaxis (must document  contraindications for both in this order), , Does not apply, See Admin Instructions, Montse Rosa PA-C     dextrose 5% and 0.45% NaCl + KCl 20 mEq/L infusion, , Intravenous, Continuous, Montse Rosa PA-C, Last Rate: 100 mL/hr at 11/29/22 1440, New Bag at 11/29/22 1440     haloperidol lactate (HALDOL) injection 2 mg, 2 mg, Intravenous, Q6H PRN, Sukumar Grace MD, 2 mg at 11/29/22 0555     HYDROmorphone (PF) (DILAUDID) injection 0.3 mg, 0.3 mg, Intravenous, Q4H PRN, Montse Rosa PA-C     lidocaine (LMX4) cream, , Topical, Q1H PRN, Montse Rosa PA-C     lidocaine 1 % 0.1-1 mL, 0.1-1 mL, Other, Q1H PRN, Montse Rosa PA-C     LORazepam (ATIVAN) injection 0.5-1 mg, 0.5-1 mg, Intravenous, Q4H PRN, Montse Rosa PA-C     LORazepam (ATIVAN) injection 0.5-1 mg, 0.5-1 mg, Intravenous, Q4H PRN, Sukumar Grace MD, 1 mg at 11/29/22 1048     melatonin tablet 1 mg, 1 mg, Oral, At Bedtime PRN, Montse Rosa PA-C     metoprolol (LOPRESSOR) injection 2.5 mg, 2.5 mg, Intravenous, Q4H PRN, Montse Rosa PA-C     metoprolol (LOPRESSOR) injection 5 mg, 5 mg, Intravenous, Q6H, Raji Duarte, DO, 5 mg at 11/29/22 1150     OLANZapine zydis (zyPREXA) ODT half-tab 2.5 mg, 2.5 mg, Oral, BID, Aruna Palumbo APRN CNP, 2.5 mg at 11/29/22 1150     ondansetron (ZOFRAN ODT) ODT tab 4 mg, 4 mg, Oral, Q6H PRN **OR** ondansetron (ZOFRAN) injection 4 mg, 4 mg, Intravenous, Q6H PRN, Montse Rosa PA-C     prochlorperazine (COMPAZINE) injection 5 mg, 5 mg, Intravenous, Q6H PRN **OR** prochlorperazine (COMPAZINE) tablet 5 mg, 5 mg, Oral, Q6H PRN **OR** prochlorperazine (COMPAZINE) suppository 12.5 mg, 12.5 mg, Rectal, Q12H PRN, Montse Rosa PA-C     sodium chloride (PF) 0.9% PF flush 3 mL, 3 mL, Intracatheter, Q8H, Montse Rosa PA-C, 3 mL at 11/29/22 0204     sodium chloride (PF) 0.9% PF flush 3 mL, 3 mL, Intracatheter, q1 min prn, Montse Rosa  "RAUL    Current Outpatient Medications:      apixaban ANTICOAGULANT (ELIQUIS) 5 MG tablet, Take 1 tablet (5 mg) by mouth 2 times daily, Disp: 180 tablet, Rfl: 3     Diphenhydramine-APAP, sleep, (TYLENOL PM EXTRA STRENGTH PO), Take 2 capsules by mouth At Bedtime, Disp: , Rfl:      furosemide (LASIX) 20 MG tablet, Take 1 tablet (20 mg) by mouth daily, Disp: 90 tablet, Rfl: 1     lisinopril (ZESTRIL) 2.5 MG tablet, Take 1 tablet (2.5 mg) by mouth daily, Disp: 90 tablet, Rfl: 3     metoprolol succinate ER (TOPROL XL) 25 MG 24 hr tablet, Take 1 tablet (25 mg) by mouth 2 times daily, Disp: 180 tablet, Rfl: 0     Multiple Minerals-Vitamins (PROSTEON PO), Take 500 mg by mouth 2 tablets twice daily , Disp: , Rfl:      simvastatin (ZOCOR) 20 MG tablet, Take 1 tablet (20 mg) by mouth At Bedtime, Disp: 90 tablet, Rfl: 0    Allergies:  Allergies   Allergen Reactions     Penicillin G Rash     And patient had an awful smell        Physical Examination:     /81 (BP Location: Right leg)   Pulse 89   Temp 97  F (36.1  C) (Axillary)   Resp 24   Ht 1.791 m (5' 10.5\")   Wt 77.1 kg (170 lb)   SpO2 95%   BMI 24.05 kg/m      Body mass index is 24.05 kg/m .      GEN: Awake and alert but disoriented and consistently speaking incoherently   HEENT: Normocephalic / atraumatic  NECK/BACK: No meningismus. No significant paraspinal neck or shoulder musculature pain.  CV: RRR. No carotid bruits.   EXT: No cyanosis. No edema. No erythema.   SKIN: No rashes or lesions on exposed skin.     NEUROLOGICAL:   MENTAL STATUS:   Alert and disoriented, with incoherent speech.  He seems to \"answer questions\" posed to him (though incomprehensible) but he is responsive and follows some commands.      CN:   II: Visual fields intact difficult to assess because of forced eye closure. PERRLA.   III, IV, VI: Cannot assess eye movements given mental status  V: Symmetric facial sensation to light touch.   VII: Face symmetric.     MOTOR:   Cannot formally " assess motor function, but does squeeze fingers to command.  He will hold out his legs and stiffen them, but can also bend at the knee and relax too    REFLEXES:   Reflexes not elicited given constant patient movement, toes are equivocal    SENSATION:   Patient responds to touch throughout but difficult to assess this formally    CEREBELLAR:   Cannot assess    GAIT:   Deferred       Review of Diagnostics:  I personally reviewed and interpreted the followin/29/22 08:01   Sodium 134 (L)   Potassium 4.2   Chloride 101   Carbon Dioxide (CO2) 22   Urea Nitrogen 18.4   Creatinine 0.92   GFR Estimate 79   Calcium 9.0   Anion Gap 11       Complete Blood Count:    Recent Labs   Lab Test 22  0801 22  0959 20  1226 02/10/16  1104 16  1337   WBC 8.5 9.2 6.5   < > 6.2   RBC 3.77* 3.67* 3.84*   < > 4.63   HGB 11.5* 11.3* 11.7*   < > 14.8   HCT 36.9* 36.0* 38.4*   < > 44.2    262 234   < > 259   LYMPH  --  19 27.6  --  24.5    < > = values in this interval not displayed.        HgA1c: No results found for: A1C     Thyroid Stimulating Hormone:   Lab Results   Component Value Date    TSH 0.77 2022        Impression:  Mr. Rucker is a 89 year old male with dementia whose mental status declined rather abruptly 4 days ago.  He is awake and alert.  Even while he will, at times, hold out his legs stiffly, he will follow commands with his upper extremities and is responsive.  Seizures do not seem to be likely, at least at this particular moment associated with that specific movement (but seizures are not excluded in general).  He will need an EEG.      His CT head shows a very small area of hemorrhage.  Despite its small size, in a 89 year old with dementia, this may still possibly relate to confusion.  He needs an MRI to see if the bleed was caused by a relevant lesion or if an ischemic stroke has occurred.  But he has a pacemaker, family explains they do not want any invasive intervention.   Thus neurosurgery has signed off.  We discussed some possibilities such as a tumor or amyloid angiopathy that could possibly explain this bleed.  As there is not an intervention that family would want to proceed with, we discussed that transfer to [facility] would not be consistent with their goals of care (at least at this time).      Recommendations:  - EEG ordered  - For now, SBP goal < 160 until stroke service weighs in  - Hold anticoagulation for now, stroke service should be involved for formal management of ICH  - He will need an MRI, see above regarding discussion with family  - Appreciate Palliative suggests for symptomatic management of encephalopathy, I would avoid benzodiazepines if possible    Continue to provide reorientation, reassurance, and stimulation during the day with lights on, blinds open, and the television on alternating with dark, quiet environment at night.  Interruptions during the night should be limited as much as possible.      Supa Richards MD (General neurology)  pgr [phone]    1. Altered mental status, unspecified altered mental status type    2. Intraparenchymal hemorrhage of brain (H)

## 2022-11-30 NOTE — PROGRESS NOTES
[facility]    Medicine Progress Note - Hospitalist Service    Date of Admission:  11/28/2022    Summary of Stay:  Mr. Rucker is a 89 year old male with dementia whose mental status declined rather abruptly just prior to admission.  Found to have an ICH.      Assessment & Plan   ICH, occurred in the setting of apixaban:  -  Family has been considering comfort approach and had wanted to see how the night last night went.  I met with family earlier today and Palliative care met with them.  They at this time as he hasn't continued to worsen would prefer basic non-comfort stroke/hemorrhage care approach at least for a day or two to see how he does, however if he worsens they would favor full move to comfort focus.  Given this, neurology and stroke neuro consulted later today.  Neurosurgery also following and does not feel surgery indicated.  CT imaging stable this AM compared to prior.  -  Continue neuro checks  -  Check bladder scans for retention  -  EEG planned, family don't want MRI.  -  Had been on some dextrose IVF earlier for low glu but given neuro issues will try to avoid using further dextrose fluids and change to 0.45 fluids.  Continue glu monitoring, treat with intermittent glucose if drops glucose.  -  As not really taking oral, will use IV lopressor.  If BP remains high will add vasotec IV to sub for his oral ACEI.   Hydralazine PRN also available.  Goal SBP < 160.  -  Speech consulted, holding on PT/OT given confusion/agitation today    Metabolic encephalopathy exacerbated by above  Dementia:  -  Had some haldol earlier which helped to a degree.   Palliative has started zyprexa scheduled.    PAF  Cardiomyopathy  SSS s/p PPM:  -  DOAC held  - IV Toprol as above    Prostate Cancer :   S/P Combined hormonal therapy and radiotherapy in 2008.  His last hormone injection was in 2010.  Patient's PSA has remained undetectable at <0.01.          Diet: NPO for Medical/Clinical Reasons  Except for: No Exceptions    DVT Prophylaxis: Pneumatic Compression Devices  Rojas Catheter: PRESENT, indication:    Central Lines: None  Cardiac Monitoring: None  Code Status: No CPR- Do NOT Intubate      Disposition Plan       likely hospice 1-2 days, EEG pending.     The patient's care was discussed with the Patient's Family.    Xenia Swift MD  Hospitalist Service  [facility]  Securely message with the Vocera Web Console (learn more here)  Text page via [employer] Paging/Directory         Clinically Significant Risk Factors         # Hyponatremia: Lowest Na = 134 mmol/L (Ref range: 136-145) in last 2 days, will monitor as appropriate                       ______________________________________________________________________    Interval History     Lethargic.    Data reviewed today: I reviewed all medications, new labs and imaging results over the last 24 hours. I personally reviewed no images or EKG's today.    Physical Exam   Vital Signs: Temp: 98.6  F (37  C) Temp src: Axillary BP: 125/49 Pulse: 78   Resp: 20 SpO2: 98 % O2 Device: None (Room air)    Weight: 170 lbs 0 oz    Gen - lethargic.  Lungs - CTA B anteriorly.  Heart - RR,S1+S2 nml, no m/g/r.  Abd - soft, NT, ND, + BS.  Ext - no edema.  HEENT - PERRL.    Data   Recent Labs   Lab 11/30/22  0801 11/30/22  0440 11/29/22  2350 11/29/22  0801 11/28/22  0959   WBC  --   --   --  8.5 9.2   HGB  --   --   --  11.5* 11.3*   MCV  --   --   --  98 98   PLT  --   --   --  253 262   NA  --   --   --  134* 135*   POTASSIUM  --   --   --  4.2 4.1   CHLORIDE  --   --   --  101 99   CO2  --   --   --  22 24   BUN  --   --   --  18.4 28.9*   CR  --   --   --  0.92 1.16   ANIONGAP  --   --   --  11 12   ENEDELIA  --   --   --  9.0 9.5   * 116* 117* 133* 119*   ALBUMIN  --   --   --   --  3.5   PROTTOTAL  --   --   --   --  6.8   BILITOTAL  --   --   --   --  0.4   ALKPHOS  --   --   --   --  52   ALT  --   --   --   --  14   AST  --   --   --   --   21     No results found for this or any previous visit (from the past 24 hour(s)).  Medications     0.45% sodium chloride + KCl 20 mEq/L 75 mL/hr at 11/30/22 0617       - MEDICATION INSTRUCTIONS -   Does not apply See Admin Instructions     enalaprilat  1.25 mg Intravenous Q6H     metoprolol  5 mg Intravenous Q6H     OLANZapine zydis  2.5 mg Oral BID     sodium chloride (PF)  3 mL Intracatheter Q8H

## 2022-11-30 NOTE — CONSULTS
[facility]  WO Nurse Inpatient Assessment     Consulted for: Coccyx    Patient History (according to provider note(s):      Mr. Rucker is a 89 year old male with dementia whose mental status declined rather abruptly just prior to admission.  Found to have an ICH.      Areas Assessed:      Areas visualized during today's visit: Sacrum/coccyx    Pressure Injury Location: Right buttock (near coccyx)  Last photo: none  Wound type: Pressure Injury     Pressure Injury Stage: 2, present on admission   Wound history/plan of care:   Family reports wound started a couple weeks ago, has been using barrier cream to site.  Wound base: 100 % dermis, nearly healed     Palpation of the wound bed: normal      Drainage: scant     Description of drainage: serous     Measurements (length x width x depth, in cm) 0.8  x 0.3  x  0.1 cm      Tunneling N/A     Undermining N/A  Periwound skin: Intact      Color: normal and consistent with surrounding tissue      Temperature: normal   Odor: none  Pain: no grimacing or signs of discomfort  Pain intervention prior to dressing change: N/A  Treatment goal: Heal   STATUS: initial assessment  Supplies ordered: supplies stored on unit and discussed with RN       Treatment Plan:     Coccyx/buttock wound(s): Every 3 days   1. Cleanse with wound cleanser and dry  2. Apply Mepilex sacral  3. Sidelying in bed, reposition Q2hrs in bed and Q1hr in chair     Orders: Written    RECOMMEND PRIMARY TEAM ORDER: None, at this time  Education provided: importance of repositioning, plan of care and Off-loading pressure  Discussed plan of care with: Patient, Family and Nurse  WOC nurse follow-up plan: weekly  Notify WOC if wound(s) deteriorate.  Nursing to notify the Provider(s) and re-consult the WO Nurse if new skin concern.    DATA:     Current support surface: Standard  Standard gel/foam mattress (IsoFlex, Atmos air, etc)  Containment of urine/stool: Incontinence Protocol and  Indwelling catheter  BMI: Body mass index is 24.05 kg/m .   Active diet order: Orders Placed This Encounter      NPO for Medical/Clinical Reasons Except for: No Exceptions     Output: I/O last 3 completed shifts:  In: -   Out: 1550 [Urine:1550]     Labs: Recent Labs   Lab 11/29/22  0801 11/28/22  0959   ALBUMIN  --  3.5   HGB 11.5* 11.3*   WBC 8.5 9.2     Pressure injury risk assessment:   Sensory Perception: 3-->slightly limited  Moisture: 3-->occasionally moist  Activity: 1-->bedfast  Mobility: 2-->very limited  Nutrition: 2-->probably inadequate  Friction and Shear: 2-->potential problem  Everardo Score: 13    Ant Sanchez RN [facility]  Dept. Pager: [phone]  Dept. Office Number: [phone]

## 2022-11-30 NOTE — PROGRESS NOTES
"Brief Stroke note:  Considering transfer to [facility] for MRI in setting of cardiac device not compatible with [employer] device. Called [facility] MRI who indicated patient device is not compatible with their scanner under any condition. Called [facility] MRI who indicate that MRI could not be completed on an inpatient/urgent basis--would need to be outpatient MRI with prior approval by cardiology and coordination with device RN and device company representative a process that can take 1-2 weeks.     Recommendations:  -as urgent/inpatient MRI would not be available at another facility, this would not be an indication to transfer patient  -will order for coordination of outpatient MRI, per MRI department likely will be 1-2 weeks out  -continue to hold anticoagulation and antiplatelet medications; will re-evauate at stroke clinic follow up visit  -additional recommendations per full consultation note of today's date   -discussed with vascular neurology attending and [facility] Hospitalist     Follow up:  -PCP in 1-2 weeks  -[facility] stroke clinic 4-6 weeks (ordered)  -brain MRI next available, will require additional coordination given non-compatible cardiac device in place (ordered)    No further stroke workup is recommended, we will sign off. Please contact us for additional questions or concerns.      Dolores ALVARADO, CNP  Vascular Neurology  To page me or covering stroke neurology team member, click here: AMCOM   Choose \"On Call\" tab at top, then search dropdown box for \"Neurology Adult\", select location, press Enter, then look for stroke/neuro ICU/telestroke.    "

## 2022-11-30 NOTE — CONSULTS
[facility]    Stroke Consult Note    Reason for Consult:  Hemorrhage     Chief Complaint: increased confusion/lethargy       HPI  Jose Rucker is a 89 year old male with PMH of atrial fibrillation on Eliquis, HTN, HLD, dementia and prostate cancer. He presented to the ED 11/28/22 for evaluation of increasing confusion and fatigue over the past 4 days. Head CT with small left temporal intraparenchymal hemorrhage. Eliquis was held (not reversed) and neurosurgery has recommended no surgical intervention. Patient unable to have MRI with transfer to [facility] due to cardiac implant; family wishes to pursue limited intervention and has declined transfer. Repeat CT 11/28 and 11/29 without significant interval change and clinically he has been stable. Neurosurgery signed off as family clear that surgical intervention is not desired. General neurology saw patient 11/29 and recommend EEG, ongoing holding of Eliquis and involvement of stroke neurology given bleed. Patient reported to be clinically stable since admission.     Today, Genaro is accompanied by his daughter and wife. They express concern that patient remains very confused and minimally responsive over the past couple of days, despite the bleed remaining stable.  RN reports exam unchanged. Patient has started Zyprexa for delirium. General neurology has ordered EEG. Family reports today that MRI was not pursued primarily due to bed availability at receiving hospital--they reiterate that they would not want invasive intervention such as surgery but would like MRI to be completed if it can help with treatment planning or prognostication.      Evaluation Summarized  CT head 11/29: no significant interval change     CT head 11/28: 8mm hemorrhage of left temporal lobe with small volume intraventricular extension; area of surrounding edema appears somewhat atypical raising concern of underlying mass vs CAA    Impression  1. Left  "temporal hemorrhage with intraventricular extension. Stable on repeat imaging. Etiology unclear, with concern of underlying mass vs CAA vs hypertensive vs iatrogenic secondary to anticoagulation     2. Encephalopathy, underlying dementia. Degree of current encephalopathy appears our of proportion to area of small stable bleed. General neurology consulted, pursing EEG.     Recommendations  -neurochecks and vital signs Q 4 hours  -recommend transfer to [facility] or [facility] for MRI to identify etiology of bleed (patient unable to have MRI at [facility] due to cardiac device); family would like to pursue MRI to help guide further decisions regarding anticoagulation/blood thinners and to assist in prognostication   -inpatient BP goal <160 with long term outpatient BP goal <140; appropriate to restart home BP medications if patient recovers ability to safely swallow   -continue to hold Eliquis; risks vs benefits of restarting anticoagulation therapy unclear without further investigation of etiology of hemorrhage via MRI. Watchman device placement would be an alternative to long term anticoagulation if aligns with goals of care  -STAT head CT for acute change in neurological status; no further routine imaging recommended if patient remains clinically stable  -general neurology following for encephalopathy/confusion, EEG pending   -infectious/metabolic/toxic workup for causes of encephalopathy per primary team    Patient Follow-up    - final recommendation pending work-up    Thank you for this consult. We will continue to follow.     Dolores ALVARADO, CNP  Vascular Neurology  To page me or covering stroke neurology team member, click here: [facility]COM   Choose \"On Call\" tab at top, then search dropdown box for \"Neurology Adult\", select location, press Enter, then look for stroke/neuro ICU/telestroke.  _____________________________________________________    Clinically Significant Risk Factors Present on Admission                 Past " Medical History   Past Medical History:   Diagnosis Date     A-fib (H)      Bradycardia     PPM 5/27/2008     Cardiac pacemaker in situ 9/17/2014 2008 PPM Medtronic Sensia, model# SEDR01, serial# [id number]       Hearing loss      Heart murmur 5/23/2007    Overview:  Benign Echo     Hernia, abdominal      HTN (hypertension)      Hyperlipidemia with target LDL less than 130 5/23/2007    Overview:  ICD 10     Irregular heart beat     AFIB     Osteoarthritis of pelvic region 11/12/2008    Overview:  LW Modifier:  Rt, SHARON done 1/15/09 LW Onset:  5-6 years ; DJD Hip     Pacemaker      Palpitations      Primary cardiomyopathy (H) 11/4/2014     Problem list name updated by automated process. Provider to review     Prostate cancer (H) 6/26/2007    Overview:  [place] Score:5+5=1 Radiation Therapy x 25 + Radioactive Seed Implant     Sick sinus syndrome (H)      Syncope      Syncope      Past Surgical History   Past Surgical History:   Procedure Laterality Date     APPLY WOUND VAC Left 7/16/2020    Procedure: WOUND VAC PLACEMENT;  Surgeon: Phil Orellana MD;  Location:  OR     CYSTOSCOPY       EP PPM GENERATOR CHANGE DUAL  2/10/16     HERNIA REPAIR       IMPLANT PACEMAKER  5/7/08    Medtronic Sensia, model# SEDR01, serial# [id number]     IRRIGATION AND DEBRIDEMENT LOWER EXTREMITY, COMBINED Left 7/16/2020    Procedure: IRRIGATION AND DEBRIDEMENT LEFT LEG WOUND;  Surgeon: Phil Orellana MD;  Location:  OR     PROSTATE SURGERY       Medications   Home Meds  Prior to Admission medications    Medication Sig Start Date End Date Taking? Authorizing Provider   apixaban ANTICOAGULANT (ELIQUIS) 5 MG tablet Take 1 tablet (5 mg) by mouth 2 times daily 11/19/21  Yes Mode Ya MD   Diphenhydramine-APAP, sleep, (TYLENOL PM EXTRA STRENGTH PO) Take 2 capsules by mouth At Bedtime   Yes Reported, Patient   furosemide (LASIX) 20 MG tablet Take 1 tablet (20 mg) by mouth daily 6/21/22  Yes Mode Ya  MD   lisinopril (ZESTRIL) 2.5 MG tablet Take 1 tablet (2.5 mg) by mouth daily 21  Yes Mode Ya MD   metoprolol succinate ER (TOPROL XL) 25 MG 24 hr tablet Take 1 tablet (25 mg) by mouth 2 times daily 11/15/22  Yes Mode Ya MD   Multiple Minerals-Vitamins (PROSTEON PO) Take 500 mg by mouth 2 tablets twice daily    Yes Reported, Patient   simvastatin (ZOCOR) 20 MG tablet Take 1 tablet (20 mg) by mouth At Bedtime 11/15/22  Yes Mode Ya MD       Scheduled Meds    - MEDICATION INSTRUCTIONS -   Does not apply See Admin Instructions     enalaprilat  1.25 mg Intravenous Q6H     metoprolol  5 mg Intravenous Q6H     OLANZapine zydis  5 mg Oral At Bedtime     sodium chloride (PF)  3 mL Intracatheter Q8H       Infusion Meds    0.45% sodium chloride + KCl 20 mEq/L 75 mL/hr at 22 0617       PRN Meds  acetaminophen **OR** acetaminophen, glucose **OR** dextrose **OR** glucagon, haloperidol lactate, hydrALAZINE, HYDROmorphone, lidocaine 4%, lidocaine (buffered or not buffered), LORazepam, melatonin, naloxone **OR** naloxone **OR** naloxone **OR** naloxone, ondansetron **OR** ondansetron, prochlorperazine **OR** prochlorperazine **OR** prochlorperazine, sodium chloride (PF)    Allergies   Allergies   Allergen Reactions     Penicillin G Rash     And patient had an awful smell      Family History   Family History   Problem Relation Age of Onset     Heart Disease Mother      Social History   Social History     Tobacco Use     Smoking status: Former     Packs/day: 0.50     Years: 6.00     Pack years: 3.00     Types: Cigarettes     Quit date: 6     Years since quittin.9     Smokeless tobacco: Never   Substance Use Topics     Alcohol use: Yes     Comment: 1 beer daily     Drug use: Never       Review of Systems   The 10 point Review of Systems is negative other than noted in the HPI or here.        PHYSICAL EXAMINATION   Temp:  [97.8  F (36.6  C)-98.7  F (37.1   C)] 98.7  F (37.1  C)  Pulse:  [70-87] 87  Resp:  [20-26] 26  BP: (117-170)/() 138/49  SpO2:  [93 %-98 %] 93 %    General Exam  General:  patient lying in bed without any acute distress    HEENT:  normocephalic/atraumatic  Pulmonary:  no respiratory distress      Neuro Exam  Mental Status: Lethargic but opens eyes to repetitive verbal stimulation, limited verbal output which only consists of grunts and groans no formed words, unable to state name or answer questions, does not follow any commands   Cranial Nerves:eyes appear deviated to right but difficult to assess as patient unable to follow commands for eye movements--eyes do appear to cross midline spontaneously at times; PERRLA (tested by nurse), no clear visual field deficits (tested by nurse) facial sensation and strength appear intact, severe dysarthria  Motor:  no abnormal movements, does not lift arms/legs on command or maintain lift if positioned by RN--limbs fall to bed but there does appear to be some antigravity movement in all extremities   Reflexes:  unable to test (telestroke)  Sensory:  Sensation to light touch and mild noxious stimuli appears intact throughout all extremities, no micheal neglect   Coordination: no ataxia identified   Station/Gait:  unable to test due to telestroke    Dysphagia Screen  Per Nursing    Stroke Scales    NIHSS  1a. Level of Consciousness 0-->Alert, keenly responsive   1b. LOC Questions 2-->Answers neither question correctly   1c. LOC Commands 0-->Performs both tasks correctly   2.   Best Gaze 1-->Partial gaze palsy, gaze is abnormal in one or both eyes, but forced deviation or total gaze paresis is not present   3.   Visual 0-->No visual loss   4.   Facial Palsy 0-->Normal symmetrical movements   5a. Motor Arm, Left 2-->Some effort against gravity, limb cannot get to or maintain (if cued) 90 (or 45) degrees, drifts down to bed, but has some effort against gravity   5b. Motor Arm, Right 2-->Some effort against  gravity, limb cannot get to or maintain (if cued) 90 (or 45) degrees, drifts down to bed, but has some effort against gravity   6a. Motor Leg, Left 2-->Some effort against gravity, leg falls to bed by 5 secs, but has some effort against gravity   6b. Motor Leg, right 2-->Some effort against gravity, leg falls to bed by 5 secs, but has some effort against gravity   7.   Limb Ataxia 0-->Absent   8.   Sensory 0-->Normal, no sensory loss   9.   Best Language 2-->Severe aphasia, all communication is through fragmentary expression, great need for inference, questioning, and guessing by the listener. Range of information that can be exchanged is limited, listener carries burden of. . . (see row details)   10. Dysarthria 2-->Severe dysarthria, patients speech is so slurred as to be unintelligible in the absence of or out of proportion to any dysphasia, or is mute/anarthric   11. Extinction and Inattention  0-->No abnormality   Total 15 (11/30/22 1302)       Imaging  I personally reviewed all imaging; relevant findings per HPI.    Labs Data   CBC  Recent Labs   Lab 11/29/22  0801 11/28/22  0959   WBC 8.5 9.2   RBC 3.77* 3.67*   HGB 11.5* 11.3*   HCT 36.9* 36.0*    262     Basic Metabolic Panel   Recent Labs   Lab 11/30/22  1226 11/30/22  0801 11/30/22  0440 11/29/22  2350 11/29/22  0801 11/28/22  0959   NA  --   --   --   --  134* 135*   POTASSIUM  --   --   --   --  4.2 4.1   CHLORIDE  --   --   --   --  101 99   CO2  --   --   --   --  22 24   BUN  --   --   --   --  18.4 28.9*   CR  --   --   --   --  0.92 1.16   * 128* 116*   < > 133* 119*   ENEDELIA  --   --   --   --  9.0 9.5    < > = values in this interval not displayed.     Liver Panel  Recent Labs   Lab 11/28/22  0959   PROTTOTAL 6.8   ALBUMIN 3.5   BILITOTAL 0.4   ALKPHOS 52   AST 21   ALT 14     INR    Recent Labs   Lab Test 02/10/16  1104   INR 1.07      Lipid Profile    Recent Labs   Lab Test 11/19/21  0925 11/23/20  0923   CHOL 173 199   HDL 47 54    LDL 94 114*   TRIG 159* 154*     A1C  No lab results found.  Troponin    Recent Labs   Lab 11/28/22  0959   CTROPT 60*          Stroke Consult Data Data   Telestroke Service Details  (for non-emergent stroke consult with tele)  Video start time 11/30/22   1307   Video end time 11/30/22   1325   Type of service telemedicine diagnostic assessment of acute neurological changes   Reason telemedicine is appropriate patient requires assessment with a specialist for diagnosis and treatment of neurological symptoms   Mode of transmission secure interactive audio and video communication per Dominguez   Originating site (patient location) [facility]    Distant site (provider location) [facility]     I have personally spent a total of 80 minutes providing care today, time spent in reviewing medical records and reviewing tests, examining the patient and obtaining history, coordination of care, and discussion with the patient and/or family regarding diagnostic results, prognosis, symptom management, risks and benefits of management options, and development of plan of care. Greater than 50% was spent in counseling and coordination of care.

## 2022-11-30 NOTE — PLAN OF CARE
Pertinent assessments: Lethargic and confused. RADHA orientation, unable to preform neuros. Responds to voice/touch. VSS. RA.  Incontinent of urine. Pulls at lines. Sitter at bedside.   Major Shift Events: Straight cathed & then clarke placed.   Treatment Plan: Neurology and Palliative following. Neuro checks Q4. IVF. Monitor for agitation. Bedside attendant.   Bedside Nurse: Rossy Chu RN

## 2022-11-30 NOTE — PROGRESS NOTES
"   11/30/22 0900   Appointment Info   Signing Clinician's Name / Credentials (SLP) Stefany Anderson MA CCC-SLP   General Information   Onset of Illness/Injury or Date of Surgery 11/28/22   Referring Physician Raji Duarte,    Pertinent History of Current Problem Per MD: \"Mr. Rucker is a 89 year old male with dementia whose mental status declined rather abruptly just prior to admission.  Found to have an ICH.\" SLP consulted for a bedside swallow eval to assess potential candidacy to safely take PO.   General Observations lethargic, wearing mitts on hands, sitter present   Type of Evaluation   Type of Evaluation Swallow Evaluation   Oral Motor   Oral Musculature unable to assess due to poor participation/comprehension   Mucosal Quality dry   Dentition (Oral Motor)   Comment, Dentition (Oral Motor) unable to fully view dentition due to pt refusal/poor participation   Dentition (Oral Motor) natural dentition   Facial Symmetry (Oral Motor)   Comment, Facial Symmetry (Oral Motor) no obvious asymmetry   Lip Function (Oral Motor)   Lip Range of Motion (Oral Motor) unable/difficult to assess   Tongue Function (Oral Motor)   Tongue ROM (Oral Motor) unable/difficult to assess   Jaw Function (Oral Motor)   Jaw Function (Oral Motor) unable/difficult to assess   Cough/Swallow/Gag Reflex (Oral Motor)   Soft Palate/Velum (Oral Motor) unable/difficult to assess   Vocal Quality/Secretion Management (Oral Motor)   Vocal Quality (Oral Motor) wet/gurgly;hoarse   General Swallowing Observations   Past History of Dysphagia no prior hx of dysphagia listed in EMR   Comment, Secretions/Suctioning audible secretions, wet breathing/wet voice noted at rest. Pt drooling upon arrival.   Respiratory Support (General Swallowing Observations) none  (room air)   Current Diet/Method of Nutritional Intake (General Swallowing Observations, NIS) NPO   Swallowing Evaluation Clinical swallow evaluation   Clinical Swallow Evaluation   Feeding " Assistance dependent   Clinical Swallow Evaluation Textures Trialed thin liquids   Clinical Swallow Eval: Thin Liquid Texture Trial   Mode of Presentation, Thin Liquids   (H20 on swab)   Oral Phase of Swallow delayed AP movement;premature pharyngeal entry   Oral Residue left lip drooling;right lip drooling   Pharyngeal Phase of Swallow impaired;coughing/choking;no awareness of problems;reduction in laryngeal movement;wet vocal quality after swallow   Diagnostic Statement Poor bolus control/awareness with suspected premature pharyngeal entry, no pharyngeal swallow initiated to palpation, immediate wet voice and throat clears noted. At elevated risk for aspiration, no further trials served.   Esophageal Phase of Swallow   Patient reports or presents with symptoms of esophageal dysphagia No   Swallowing Recommendations   Diet Consistency Recommendations NPO;consider alternative means of nutrition  (consider alternative means if c/w GOC)   Medication Administration Recommendations, Swallowing (SLP) unable to safely swallow PO  medication at this time; recommend alternative source for nutrition/hydration/medication   Instrumental Assessment Recommendations instrumental evaluation not recommended at this time   General Therapy Interventions   Planned Therapy Interventions Dysphagia Treatment   Dysphagia treatment   (PO readiness)   Clinical Impression   Criteria for Skilled Therapeutic Interventions Met (SLP Eval) Yes, treatment indicated   SLP Diagnosis severe oropharyngeal dysphagia   Problem List (SLP) anticipate inadequate PO intake, elevated risk for aspiration   Functional Limitations Related to Problem List (SLP) unable to safely swallow per baseline ability   Risks & Benefits of therapy have been explained evaluation/treatment results reviewed;care plan/treatment goals reviewed;risks/benefits reviewed;patient;participants included   Clinical Impression Comments Clinical dysphagia examination completed per MD  order. The patient presents with severe oropharyngeal dysphagia at this time, characterized by poor bolus awareness, reduced/absent laryngeal elevation, and positive signs/symptoms of aspiration. Recommend NPO due to high aspiration risk, with consideration of alternative source for nutrition/hydration/medication if c/w family decisions regarding GOC.  Recommend frequent oral cares for hygiene and comfort. SLP will follow closely for PO readiness as indicated.   SLP Total Evaluation Time   Eval: oral/pharyngeal swallow function, clinical swallow Minutes (42962) 14   SLP Goals   Therapy Frequency (SLP Eval) 5 times/wk   SLP Predicted Duration/Target Date for Goal Attainment 12/13/22   SLP Goals Swallow   SLP: Safely tolerate diet without signs/symptoms of aspiration One P.O. texture;With assistance/supervision;With use of compensatory swallow strategies   Interventions   Interventions Quick Adds Swallowing Dysfunction   SLP Discharge Planning   SLP Plan PO readiness if c/w GOC   SLP Discharge Recommendation Long term care facility;Transitional Care Facility   SLP Rationale for DC Rec elevated risk for aspiration, currently unable to safely swallow PO diet   SLP Brief overview of current status  Recommend NPO due to high aspiration risk, with consideration of alternative source for nutrition/hydration/medication if c/w family decisions regarding GOC.  Recommend frequent oral cares for hygiene and comfort.   Total Session Time   Total Session Time (sum of timed and untimed services) 14

## 2022-11-30 NOTE — PROGRESS NOTES
[facility]  Palliative Care Progress Note  Text Page     Assessment & Plan   Recommendations:  1. Goals of Care- No CPR- Do NOT Intubate  Hospitalization goals discussed with patient, spouse Shahla and daughter Dilcia.  Goal to assess work up in the coming days and meet for family meeting on Thursday.  Decisional Capacity- Unreliable. Patient does not have an advance directive. Per  informed consent policy next of kin should be involved in all consent and decision making.  POLST none on file, recommend completion prior to discharge     2. Encephalopathy, with behavioral component  Baseline confusion due to dementia, now likely exacerbated by intracranial hemorrhage. Agitated, reaching for things, delirious and attempting to get out of bed.  - Zyprexa ODT 5 mg daily at bedtime  - haloperidol 2 mg every 6 hours prn agitation  - ativan IV/PO 0.5 - 1 mg every 4 hours prn  - consider  if medications are ineffective to maintain safety.     3. Poor PO intake  Patient is not safe for PO intake due to encephalopathy.  - NPO per primary service.    - ODT tablets, IV formulations of medications reasonable.     4. Generalized weakness  Patient with altered mental status and unable to mobilize due to lethargy and ataxic movements.  - bedrest, assess for readiness to mobilize with therapy once patient is able     5. Spiritual Care  Oriented to Spiritual Health as part of Palliative Care team. Appreciate Care of [place]  Spiritual Background: Gnosticist  Patient's  from [facility] was at the bedside on 11/28 and provided anointment per spouse/daughter's report.     5. Care Planning  Appreciate Care of Interdisciplinary team.  Disposition plan pending.     Medical Decision Making and Goals of Care:  Discussed on November 30, 2022 with JENNY Fowler CNP:   Met with Genaro, Shahla and Dilcia at the bedside.  Genaro is resting and did not interact to exam.  Reviewed  overnight and this morning's assessment.  Inquired about their understanding of the plan of care and related questions or concerns.  They reviewed their understanding of possible EEG and antiseizure medication.  They verbalized concern about his lethargy today.  Discussed possible side effect of zyprexa and reviewed plan to discontinue daytime dose.  They verbalized understanding of this plan and agreement.      Reviewed plan for family meeting tomorrow to review their preferences for plan of care and a review of Genaro's wishes and goals for his own care.  They verbalized agreement to this plan.    JENNY Fowler CNP  Pain Management and Palliative Care  [facility]  Pgr: [phone]      Time Spent on this Encounter   Total unit/floor time 25 minutes, time consisted of the following, examination of the patient, reviewing the record and completing documentation. >50% of time spent in counseling and coordination of care, Bedside Nurse Sherine and Hospitalist Jamison.  Time spend counseling with patient and family consisted of the following topics, symptom management.    Review of Systems   CONSTITUTIONAL: NEGATIVE for fever, chills  ENT/MOUTH: NEGATIVE for ear, mouth and throat problems  RESP: NEGATIVE for significant cough or SOB  CV: NEGATIVE for chest pain, palpitations or peripheral edema    Physical Exam   Temp:  [97.8  F (36.6  C)-98.7  F (37.1  C)] 98.7  F (37.1  C)  Pulse:  [70-87] 87  Resp:  [20-26] 26  BP: (103-172)/() 138/49  SpO2:  [93 %-98 %] 93 %  170 lbs 0 oz  Exam:  GEN:  Lethargic, appears comfortable, NAD.  HEENT:  Normocephalic/atraumatic, no scleral icterus, no nasal discharge, mouth moist.  RESP:  Symmetric chest rise on inhalation noted.  Normal respiratory effort.  ABD:  Rounded, soft, non-tender/non-distended.  +BS  EXT:  Edema & pulses as noted above.  CMS intact x 4.     SKIN:  Dry to touch, no exanthems noted in the visualized areas.    NEURO: Lethargic,  no focal deficits  Psych:  RADHA    Medications     0.45% sodium chloride + KCl 20 mEq/L 75 mL/hr at 11/30/22 0617       - MEDICATION INSTRUCTIONS -   Does not apply See Admin Instructions     enalaprilat  1.25 mg Intravenous Q6H     metoprolol  5 mg Intravenous Q6H     OLANZapine zydis  5 mg Oral At Bedtime     sodium chloride (PF)  3 mL Intracatheter Q8H       Data   Results for orders placed or performed during the hospital encounter of 11/28/22 (from the past 24 hour(s))   Glucose by meter   Result Value Ref Range    GLUCOSE BY METER POCT 117 (H) 70 - 99 mg/dL   Glucose by meter   Result Value Ref Range    GLUCOSE BY METER POCT 116 (H) 70 - 99 mg/dL   Glucose by meter   Result Value Ref Range    GLUCOSE BY METER POCT 128 (H) 70 - 99 mg/dL

## 2022-12-01 NOTE — PLAN OF CARE
Pertinent assessments: Pt Lethargic and confused. RADHA orientation, unable to preform neuros. VSS, on RA.  Incontinent of urine, clarke in place. No pain noted.   Major Shift Events: temp 102.2, tylenol suppository given.   Treatment Plan: Neurology and Palliative following. Neuro checks Q4. IVF. Monitor for agitation.   Bedside Nurse: Nita Phan RN

## 2022-12-01 NOTE — PROGRESS NOTES
[facility]    Medicine Progress Note - Hospitalist Service    Date of Admission:  11/28/2022    Assessment & Plan      Summary of Stay:  Mr. Rucker is a 89 year old male with dementia whose mental status declined rather abruptly just prior to admission.  Found to have an ICH.      ICH, occurred in the setting of apixaban:  -  Family has been considering comfort approach and had wanted to see how the night last night went.  I met with family earlier today and Palliative care met with them.  They at this time as he hasn't continued to worsen would prefer basic non-comfort stroke/hemorrhage care approach at least for a day or two to see how he does, however if he worsens they would favor full move to comfort focus.  Given this, neurology and stroke neuro consulted later today.  Neurosurgery also following and does not feel surgery indicated.  CT imaging stable this AM compared to prior.  -  Continue neuro checks  -  Check bladder scans for retention  -  EEG planned, family don't want MRI.  -  Had been on some dextrose IVF earlier for low glu but given neuro issues will try to avoid using further dextrose fluids and change to 0.45 fluids.  Continue glu monitoring, treat with intermittent glucose if drops glucose.  -  As not really taking oral, will use IV lopressor.  If BP remains high will add vasotec IV to sub for his oral ACEI.   Hydralazine PRN also available.  Goal SBP < 160.  -  Speech consulted, holding on PT/OT given confusion/agitation today    Metabolic encephalopathy exacerbated by above  Dementia:  -  Had some haldol earlier which helped to a degree.   Palliative has started zyprexa scheduled.    PAF  Cardiomyopathy  SSS s/p PPM:  -  DOAC held  - IV Toprol as above    Prostate Cancer :   S/P Combined hormonal therapy and radiotherapy in 2008.  His last hormone injection was in 2010.  Patient's PSA has remained undetectable at <0.01.     Fever:  - discussed with family and they agree  with further work-up, will get CXR, UA, blood work.         Diet: NPO for Medical/Clinical Reasons Except for: No Exceptions    DVT Prophylaxis: Pneumatic Compression Devices  Rojas Catheter: PRESENT, indication:    Central Lines: None  Cardiac Monitoring: None  Code Status: No CPR- Do NOT Intubate      Disposition Plan       ? Hospice 2-4 days.     The patient's care was discussed with the Patient's Family.    Xenia Swift MD  Hospitalist Service  [facility]  Securely message with the Vocera Web Console (learn more here)  Text page via [employer] Paging/Directory         Clinically Significant Risk Factors                               ______________________________________________________________________    Interval History     + fever. + lethargy.    Data reviewed today: I reviewed all medications, new labs and imaging results over the last 24 hours. I personally reviewed no images or EKG's today.    Physical Exam   Vital Signs: Temp: (!) 101.7  F (38.7  C) Temp src: Axillary BP: 115/55 Pulse: 85   Resp: 24 SpO2: 94 % O2 Device: Nasal cannula Oxygen Delivery: 2 LPM  Weight: 170 lbs 0 oz    Gen - lethargic.  Lungs - CTA B anteriorly.  Heart - RR,S1+S2 nml, no m/g/r.  Abd - soft, NT, ND, + BS.  Ext - no edema.  HEENT - PERRL.    Data   Recent Labs   Lab 12/01/22  0804 12/01/22  0431 12/01/22  0007 11/29/22  2350 11/29/22  0801 11/28/22  0959   WBC  --   --   --   --  8.5 9.2   HGB  --   --   --   --  11.5* 11.3*   MCV  --   --   --   --  98 98   PLT  --   --   --   --  253 262   NA  --   --   --   --  134* 135*   POTASSIUM  --   --   --   --  4.2 4.1   CHLORIDE  --   --   --   --  101 99   CO2  --   --   --   --  22 24   BUN  --   --   --   --  18.4 28.9*   CR  --   --   --   --  0.92 1.16   ANIONGAP  --   --   --   --  11 12   ENEDELIA  --   --   --   --  9.0 9.5   * 107* 98   < > 133* 119*   ALBUMIN  --   --   --   --   --  3.5   PROTTOTAL  --   --   --   --   --  6.8   BILITOTAL  --   --    --   --   --  0.4   ALKPHOS  --   --   --   --   --  52   ALT  --   --   --   --   --  14   AST  --   --   --   --   --  21    < > = values in this interval not displayed.     No results found for this or any previous visit (from the past 24 hour(s)).  Medications     0.45% sodium chloride + KCl 20 mEq/L 75 mL/hr at 12/01/22 0825       - MEDICATION INSTRUCTIONS -   Does not apply See Admin Instructions     enalaprilat  1.25 mg Intravenous Q6H     metoprolol  5 mg Intravenous Q6H     OLANZapine zydis  5 mg Oral At Bedtime     scopolamine  1 patch Transdermal Q72H    And     scopolamine   Transdermal Q8H     sodium chloride (PF)  3 mL Intracatheter Q8H

## 2022-12-01 NOTE — PROGRESS NOTES
[facility]  Palliative Care Progress Note  Text Page     Assessment & Plan   Recommendations:  1. Goals of Care- No CPR- Do NOT Intubate  Hospitalization goals discussed with patient, spouse Shahla and daughter Dilcia.  Goal to transition to comfort focused cares at this time due to his further decline since time of admission and his failure to manage airway or secretions.  Decisional Capacity- Unreliable. Patient does not have an advance directive. Per  informed consent policy next of kin should be involved in all consent and decision making.  POLST none on file, recommend completion prior to discharge   - comfort measures order set placed.    2. Encephalopathy, with behavioral component  Baseline confusion due to dementia, now likely exacerbated by intracranial hemorrhage. Lethargic without agitated behaviors overnight.   - comfort focused cares.  PRN anxiolytics per order set.     3. Poor PO intake  Patient is not safe for PO intake due to encephalopathy.  NPO due to concern for aspiration/dysphagia  - NPO per primary service.    - ODT tablets, IV formulations of medications reasonable.  - comfort measures.     4. Generalized weakness  Patient with altered mental status and unable to mobilize due to lethargy and ataxic movements.  - bedrest, assess for readiness to mobilize with therapy once patient is able     5. Impaired ability to clear airway of secretions  Patient with active 'rattling' on assessment, wet secretions in posterior pharynx. Nasal trumpet placed by nursing team to promote suction clearance.  - comfort cares  - secretion management medications per comfort order set  - ok to maintain nasal trumpet and suction schedule to promote comfort and manage family anxiety.     6. Spiritual Care  Oriented to Spiritual Health as part of Palliative Care team. Appreciate Care of Chaplain Stanford Apt  Spiritual Background: Worship  Patient's  from [facility] was at the  bedside on 11/28 and provided anointment per spouse/daughter's report.     7. Care Planning  Appreciate Care of Interdisciplinary team.  Disposition plan pending.     Medical Decision Making and Goals of Care:  Discussed on December 1, 2022 with JENNY Fowler CNP:   Met with patient and family at the bedside.  Genaro has his eyes open, ongoing rattling airway noises and no cough or vocalization to assessment questions.    Reviewed ongoing decline with Dilcia Gale and Miguel.  Discussed their impression of Genaro's status and Shahla stated that she does not feel that he will improve.  Discussed transition to comfort measures and reviewed what that would entail.  They verbalized understanding and agreement to transition to this plan.   Educated on comfort medications, secretion management and medication de-escalation to accommodate this plan of care.  They verbalized understanding.  Planned to follow up daily for further symptom management needs / titration of medications.  They verbalized understanding.    JENNY Fowler CNP  Pain Management and Palliative Care  [facility]  Pgr: [phone]      Time Spent on this Encounter   Total unit/floor time 35 minutes , time consisted of the following, examination of the patient, reviewing the record and completing documentation. >50% of time spent in counseling and coordination of care, Bedside Nurse Sherine and Hospitalist Jamison.  Time spend counseling with patient and family consisted of the following topics, symptom management.      Review of Systems   CONSTITUTIONAL: NEGATIVE for fever, chills  ENT/MOUTH: NEGATIVE for ear, mouth and throat problems  RESP: NEGATIVE for significant cough or SOB  CV: NEGATIVE for chest pain, palpitations or peripheral edema    Physical Exam   Temp:  [97.4  F (36.3  C)-102.2  F (39  C)] 101.7  F (38.7  C)  Pulse:  [] 85  Resp:  [16-26] 24  BP: (107-158)/(37-75) 115/55  SpO2:  [82 %-98 %] 94 %  170  lbs 0 oz  Exam:  GEN:  Lethargic, appears comfortable, NAD.  HEENT:  Normocephalic/atraumatic, no scleral icterus, no nasal discharge, mouth moist.  RESP:  Symmetric chest rise on inhalation noted.  Coarse upper airway breath sounds with copious secretions.  ABD:  Rounded, soft, non-tender/non-distended.  +BS  EXT:  Edema & pulses as noted above.  CMS intact x 4.     SKIN:  Dry to touch, no exanthems noted in the visualized areas.    NEURO: Lethargic, no focal deficits  Psych:  [facility]    Medications     0.45% sodium chloride + KCl 20 mEq/L 75 mL/hr at 12/01/22 0825       - MEDICATION INSTRUCTIONS -   Does not apply See Admin Instructions     enalaprilat  1.25 mg Intravenous Q6H     metoprolol  5 mg Intravenous Q6H     OLANZapine zydis  5 mg Oral At Bedtime     scopolamine  1 patch Transdermal Q72H    And     scopolamine   Transdermal Q8H     sodium chloride (PF)  3 mL Intracatheter Q8H       Data   Results for orders placed or performed during the hospital encounter of 11/28/22 (from the past 24 hour(s))   Glucose by meter   Result Value Ref Range    GLUCOSE BY METER POCT 113 (H) 70 - 99 mg/dL   Glucose by meter   Result Value Ref Range    GLUCOSE BY METER POCT 100 (H) 70 - 99 mg/dL   Glucose by meter   Result Value Ref Range    GLUCOSE BY METER POCT 96 70 - 99 mg/dL   Glucose by meter   Result Value Ref Range    GLUCOSE BY METER POCT 98 70 - 99 mg/dL   Glucose by meter   Result Value Ref Range    GLUCOSE BY METER POCT 107 (H) 70 - 99 mg/dL   Glucose by meter   Result Value Ref Range    GLUCOSE BY METER POCT 110 (H) 70 - 99 mg/dL

## 2022-12-01 NOTE — PROGRESS NOTES
Nasal trumpet placed in right nare for secretion management. Switched to 2L Oxymask. sats at 96%. Pt has persistent secretions.       Jamil Barrera, RT

## 2022-12-01 NOTE — PLAN OF CARE
Goal Outcome Evaluation:    Assumed cares [id number]. Pt lethargic, withdraws from pain. Does not follow commands. Non-verbal. Some restlessness, and better after IV Haldol. Having a lot of respiratory congestion, and suctioned orally; and, RT done deep suctioning twice. Prn Atropine drops given. Low urine output.     Today patient was placed on comfort cares.

## 2022-12-01 NOTE — PROGRESS NOTES
SPIRITUAL HEALTH SERVICES Progress Note  [facility]    Referral Source: Palliative Care team follow up visit.    Pt is resting more comfortably today.  Pt spouse and daughter are at the bedside and grateful for his cares. They were able to go home and rest well last night. Provided emotional/spiritual support and sang a hymn for Genaro.    Plan: Spiritual Health Services remains available for additional emotional/spiritual support.    Abdoulaye Woods MA  Staff   Pager: [phone] *[phone]

## 2022-12-02 NOTE — PROGRESS NOTES
[facility]    Medicine Progress Note - Hospitalist Service    Date of Admission:  11/28/2022    Assessment & Plan   Summary of Stay:  Mr. Rucker is a 89 year old male with dementia whose mental status declined rather abruptly just prior to admission.  Found to have an ICH.      ICH, occurred in the setting of apixaban:  -  Family has been considering comfort approach and had wanted to see how the night last night went.  I met with family earlier today and Palliative care met with them.  They at this time as he hasn't continued to worsen would prefer basic non-comfort stroke/hemorrhage care approach at least for a day or two to see how he does, however if he worsens they would favor full move to comfort focus.  Given this, neurology and stroke neuro consulted later today.  Neurosurgery also following and does not feel surgery indicated.  CT imaging stable this AM compared to prior.  -  Continue neuro checks  -  Check bladder scans for retention  -  EEG planned, family don't want MRI.  -  Had been on some dextrose IVF earlier for low glu but given neuro issues will try to avoid using further dextrose fluids and change to 0.45 fluids.  Continue glu monitoring, treat with intermittent glucose if drops glucose.  -  As not really taking oral, will use IV lopressor.  If BP remains high will add vasotec IV to sub for his oral ACEI.   Hydralazine PRN also available.  Goal SBP < 160.  -  Speech consulted, holding on PT/OT given confusion/agitation today    Metabolic encephalopathy exacerbated by above  Dementia:  -  Had some haldol earlier which helped to a degree.   Palliative has started zyprexa scheduled.    PAF  Cardiomyopathy  SSS s/p PPM:  -  DOAC held  - IV Toprol as above    Prostate Cancer :   S/P Combined hormonal therapy and radiotherapy in 2008.  His last hormone injection was in 2010.  Patient's PSA has remained undetectable at <0.01.     Fever:  - discussed with family and they agree  with further work-up, will get CXR, UA, blood work.      Given clinical deterioration, now on comfort cares.           Diet: NPO for Medical/Clinical Reasons Except for: No Exceptions    DVT Prophylaxis: Pneumatic Compression Devices  Rojas Catheter: PRESENT, indication:    Central Lines: None  Cardiac Monitoring: None  Code Status: No CPR- Do NOT Intubate      Disposition Plan           The patient's care was discussed with the Patient's Family.    Xenia Swift MD  Hospitalist Service  [facility]  Securely message with the Vocera Web Console (learn more here)  Text page via [employer] Paging/Directory         Clinically Significant Risk Factors         # Hyponatremia: Lowest Na = 132 mmol/L (Ref range: 136-145) in last 2 days, will monitor as appropriate      # Hypoalbuminemia: Lowest albumin = 3 g/dL (Ref range: 3.5-5.2) at 12/1/2022 10:11 AM, will monitor as appropriate                  ______________________________________________________________________    Interval History     + lethargic.    Data reviewed today: I reviewed all medications, new labs and imaging results over the last 24 hours. I personally reviewed no images or EKG's today.    Physical Exam   Vital Signs: Temp: 97.2  F (36.2  C) Temp src: Axillary BP: 119/54 Pulse: 88   Resp: 24 SpO2: 93 % O2 Device: Nasal cannula Oxygen Delivery: 2 LPM  Weight: 170 lbs 0 oz    Gen - lethargic, and probably obtunded.  Lungs - Coarse BS.  Heart - RR,S1+S2 nml, no m/g/r.  Abd - soft, NT, ND, + BS.  Ext - no edema.  HEENT - PERRL.    Data   Recent Labs   Lab 12/01/22  1204 12/01/22  1011 12/01/22  0804 11/29/22  2350 11/29/22  0801 11/28/22  0959   WBC  --  12.3*  --   --  8.5 9.2   HGB  --  11.6*  --   --  11.5* 11.3*   MCV  --  97  --   --  98 98   PLT  --  296  --   --  253 262   NA  --  132*  --   --  134* 135*   POTASSIUM  --  4.6  --   --  4.2 4.1   CHLORIDE  --  98  --   --  101 99   CO2  --  18*  --   --  22 24   BUN  --  16.4  --    --  18.4 28.9*   CR  --  1.00  --   --  0.92 1.16   ANIONGAP  --  16*  --   --  11 12   ENEDELIA  --  8.8  --   --  9.0 9.5   * 107* 110*   < > 133* 119*   ALBUMIN  --  3.0*  --   --   --  3.5   PROTTOTAL  --  6.1*  --   --   --  6.8   BILITOTAL  --  0.6  --   --   --  0.4   ALKPHOS  --  49  --   --   --  52   ALT  --  20  --   --   --  14   AST  --  45  --   --   --  21    < > = values in this interval not displayed.     Recent Results (from the past 24 hour(s))   XR Chest Port 1 View    Narrative    CHEST ONE VIEW PORTABLE  12/1/2022 10:41 AM     HISTORY: Fever.    COMPARISON: 11/28/2022.      Impression    IMPRESSION: No acute cardiopulmonary disease identified. Stable  cardiac silhouette. Left chest pacer again noted.    JOHN WHITLOCK MD         SYSTEM ID:  [id number]     Medications       - MEDICATION INSTRUCTIONS -   Does not apply See Admin Instructions     glycopyrrolate  0.2 mg Intravenous Q4H     OLANZapine zydis  5 mg Oral At Bedtime     scopolamine  1 patch Transdermal Q72H    And     scopolamine   Transdermal Q8H     sodium chloride (PF)  3 mL Intracatheter Q8H

## 2022-12-02 NOTE — PLAN OF CARE
Pertinent assessments: Pt Lethargic and confused, on comfort cares. [facility] orientation, unable to preform neuros.   Incontinent of urine, clarke in place. No pain noted. Suction secretions. Slept well.   Major Shift Events: none  Treatment Plan: comfort cares  Bedside Nurse: Ntia Phan RN

## 2022-12-02 NOTE — PLAN OF CARE
Patient is on comfort cares. On O2 mask for comfort, patient intermittently removes mask.  Notable secretions which is past beyond mouth.  Yankauer used but not effective. Airlife Tri-lisa FR14 suction cath used with better results. Family ok with suctioning secretions. PRN morphinex1,  ativanx1 and atropinex1. Rojas in place.

## 2022-12-02 NOTE — PROGRESS NOTES
[facility]    Palliative Care Chart Check    This patient's most recent hospitalist note and medication profile have been reviewed.  Patient transitioned to comfort focused cares yesterday.  Stable overnight and improved secretion management.  Tachypnea per nursing assessment.  Managed with opiates.    Recommendation:   It has been determined that no change is necessary to the current plan of care at this time.   The chart will be reviewed regularly and the patient will be seen if necessary.   Time Spent 15 minutes, none in direct contact with patient or family. >50% spent in chart review, documentation and care coordination with  Bedside Nurse Juarez and Hospitalist Jamison      Thank you!    JENNY Fowler CNP:  Pain Management and Palliative Care  [facility]  Pgr: [phone]

## 2022-12-02 NOTE — PLAN OF CARE
Speech Language Therapy Discharge Summary    Reason for therapy discharge:    Change in medical status. Pt transitioned to comfort cares.     Progress towards therapy goal(s). See goals on Care Plan in Epic electronic health record for goal details.  Goals not met.  Barriers to achieving goals:   limited tolerance for therapy and change in goals of care.    Therapy recommendation(s):    No further therapy is recommended. Per chart pt transitioned to comfort cares with difficulty with secretion management, lethargy. SLP to sign off but please re-consult if needed. Consider allowing PO if pt awakens and is able to participate per comfort philosophy.

## 2022-12-03 NOTE — PROGRESS NOTES
[facility]    Medicine Progress Note - Hospitalist Service    Date of Admission:  11/28/2022    Assessment & Plan   Summary of Stay:  Mr. Rucker is a 89 year old male with dementia whose mental status declined rather abruptly just prior to admission.  Found to have an ICH.      ICH, occurred in the setting of apixaban:  -  Family has been considering comfort approach and had wanted to see how the night last night went.  I met with family earlier today and Palliative care met with them.  They at this time as he hasn't continued to worsen would prefer basic non-comfort stroke/hemorrhage care approach at least for a day or two to see how he does, however if he worsens they would favor full move to comfort focus.  Given this, neurology and stroke neuro consulted later today.  Neurosurgery also following and does not feel surgery indicated.  CT imaging stable this AM compared to prior.  -  Continue neuro checks  -  Check bladder scans for retention  -  EEG planned, family don't want MRI.  -  Had been on some dextrose IVF earlier for low glu but given neuro issues will try to avoid using further dextrose fluids and change to 0.45 fluids.  Continue glu monitoring, treat with intermittent glucose if drops glucose.  -  As not really taking oral, will use IV lopressor.  If BP remains high will add vasotec IV to sub for his oral ACEI.   Hydralazine PRN also available.  Goal SBP < 160.  -  Speech consulted, holding on PT/OT given confusion/agitation today    Metabolic encephalopathy exacerbated by above  Dementia:  -  Had some haldol earlier which helped to a degree.   Palliative has started zyprexa scheduled.    PAF  Cardiomyopathy  SSS s/p PPM:  -  DOAC held  - IV Toprol as above    Prostate Cancer :   S/P Combined hormonal therapy and radiotherapy in 2008.  His last hormone injection was in 2010.  Patient's PSA has remained undetectable at <0.01.     Fever:  - discussed with family and they agree  with further work-up, will get CXR, UA, blood work.      Given clinical deterioration, now on comfort cares.             Diet: NPO for Medical/Clinical Reasons Except for: No Exceptions    DVT Prophylaxis: Pneumatic Compression Devices  Rojas Catheter: PRESENT, indication:    Central Lines: None  Cardiac Monitoring: None  Code Status: No CPR- Do NOT Intubate      Disposition Plan           The patient's care was discussed with the Bedside Nurse.    Xenia Swift MD  Hospitalist Service  [facility]  Securely message with the Vocera Web Console (learn more here)  Text page via [employer] Paging/Directory         Clinically Significant Risk Factors         # Hyponatremia: Lowest Na = 132 mmol/L (Ref range: 136-145) in last 2 days, will monitor as appropriate      # Hypoalbuminemia: Lowest albumin = 3 g/dL (Ref range: 3.5-5.2) at 12/1/2022 10:11 AM, will monitor as appropriate                  ______________________________________________________________________    Interval History     + lethargic.    Data reviewed today: I reviewed all medications, new labs and imaging results over the last 24 hours. I personally reviewed no images or EKG's today.    Physical Exam   Vital Signs:                    Weight: 170 lbs 0 oz    Gen - lethargic.  Lungs - Coarse BS.  Heart - RR,S1+S2 nml, no m/g/r.  Abd - soft, NT, ND, + BS.  Ext - no edema.  HEENT - PERRL.    Data   Recent Labs   Lab 12/01/22  1204 12/01/22  1011 12/01/22  0804 11/29/22  2350 11/29/22  0801 11/28/22  0959   WBC  --  12.3*  --   --  8.5 9.2   HGB  --  11.6*  --   --  11.5* 11.3*   MCV  --  97  --   --  98 98   PLT  --  296  --   --  253 262   NA  --  132*  --   --  134* 135*   POTASSIUM  --  4.6  --   --  4.2 4.1   CHLORIDE  --  98  --   --  101 99   CO2  --  18*  --   --  22 24   BUN  --  16.4  --   --  18.4 28.9*   CR  --  1.00  --   --  0.92 1.16   ANIONGAP  --  16*  --   --  11 12   ENEDELIA  --  8.8  --   --  9.0 9.5   * 107* 110*    < > 133* 119*   ALBUMIN  --  3.0*  --   --   --  3.5   PROTTOTAL  --  6.1*  --   --   --  6.8   BILITOTAL  --  0.6  --   --   --  0.4   ALKPHOS  --  49  --   --   --  52   ALT  --  20  --   --   --  14   AST  --  45  --   --   --  21    < > = values in this interval not displayed.     No results found for this or any previous visit (from the past 24 hour(s)).  Medications       - MEDICATION INSTRUCTIONS -   Does not apply See Admin Instructions     glycopyrrolate  0.2 mg Intravenous Q4H     OLANZapine zydis  5 mg Oral At Bedtime     scopolamine  1 patch Transdermal Q72H    And     scopolamine   Transdermal Q8H     sodium chloride (PF)  3 mL Intracatheter Q8H

## 2022-12-03 NOTE — PLAN OF CARE
Goal Outcome Evaluation:    Assumed cares [id number]. Pt obtunded, unresponsive. On comfort cares. No signs of pain or respiratory distress. No signs of increased phlegm noted. On room air. Repositioned per comfort.

## 2022-12-03 NOTE — PLAN OF CARE
Pertinent assessments: Pt on 2L nasal canula for comfort. Breathing tachypneic this morning, slowed after morphine given. Pt Lethargic, slept all of shift, on comfort cares. RAD[facility] orientation. Rojas in place. No pain noted. Secretion maintained with medications, no suction needed today. Family in room with pt.  Major Shift Events: none  Treatment Plan: comfort cares, PRN morphine given and scheduled kori  Bedside Nurse: Ann Marie Alonso RN

## 2022-12-03 NOTE — CONSULTS
Care Management Initial Consult    General Information  Assessment completed with: Spouse, dtr and son    Type of CM/SW Visit: Initial Assessment    Primary Care Provider verified and updated as needed:     Readmission within the last 30 days:        Reason for Consult: discharge planning, end of life/hospice  Advance Care Planning:            Communication Assessment  Patient's communication style: spoken language (English or Bilingual)             Cognitive  Cognitive/Neuro/Behavioral: .WDL except  Level of Consciousness: lethargic  Arousal Level: arouses to voice  Orientation: other (see comments) (RADHA)  Mood/Behavior: hypoactive (quiet, withdrawn)  Best Language: 3 - Mute  Speech: garbled    Living Environment:   People in home: spouse     Current living Arrangements: assisted living      Able to return to prior arrangements: no       Family/Social Support:  Care provided by: spouse/significant other  Provides care for: no one, unable/limited ability to care for self  Marital Status:              Description of Support System: Supportive, Involved         Current Resources:   Patient receiving home care services: No     Community Resources: None  Equipment currently used at home: walker, rolling  Supplies currently used at home:      Employment/Financial:  Employment Status:     retired     Financial Concerns:   none          Lifestyle & Psychosocial Needs:  Social Determinants of Health     Tobacco Use: Not on file   Alcohol Use: Not on file   Financial Resource Strain: Not on file   Food Insecurity: Not on file   Transportation Needs: Not on file   Physical Activity: Not on file   Stress: Not on file   Social Connections: Not on file   Intimate Partner Violence: Not on file   Depression: Not at risk     PHQ-2 Score: 0   Housing Stability: Not on file       Functional Status:  Prior to admission patient needed assistance:   Dependent ADLs:: Ambulation-walker, Bathing, Dressing, Eating, Grooming,  Incontinence, Positioning  Dependent IADLs:: Cleaning, Cooking, Laundry, Shopping, Money Management, Medication Management, Meal Preparation, Transportation, Incontinence       Mental Health Status:  Mental Health Status: No Current Concerns       Chemical Dependency Status:  Chemical Dependency Status: No Current Concerns             Values/Beliefs:  Spiritual, Cultural Beliefs, Nondenominational Practices, Values that affect care:                 Additional Information:  Met with pt's wife, dtr and son. Pt lives with his wife at [facility] neither of them receive any services. Pt's wife reports she had been assisting pt with ADL's and IADL's. Dtr reports pt had been using a walker previous to admission to hospital. Pt's family is interested in hospice services and requested SW to inquire if [facility] can accommodate care suite etc. If not they would be interested in [facility].SW provided a quick overview of hospice services.     SW will contact the referrals given by family and f/u with family later today.    Fernanda DAVID, [facility]  Inpatient Care Coordination   [facility]   [phone]    ADDENDUM: TEENA LVM for [facility] requesting a call back to inquire about a care suite.  Spoke with Cornelia from [facility] regarding the [facility] and she isn't sure bed availability and suggested SW make referral.  Spoke with someone at the [facility] who report they have has two beds available and requested TEENA to call Mariann at [phone].  Families first preference is [facility] so SW wait to hear back from them, if they can't accomodate SW will make referrals to the [facility] and [facility].     Addendum: Spoke with [facility] and was told they do not have care suites.  Spoke with pt's dtr Dilcia with update regarding the [place].  Dilcia requested that SW [facility] a [facility], TEENA lvm for [facility] [phone] requesting a call back.     PLAN: Will wait to hear back from [facility]  and f/u with family.

## 2022-12-04 NOTE — PLAN OF CARE
Goal Outcome Evaluation:    [facility] cares 0501-0206. Pt spmnolent, opens eyes, does not follow commands, does not talk. On comfort cares. Initially patient seemed restless, and prn Morphine 5mg solution given with some improvement. Patient noted to have pain while being changed/repositioned in bed. So, another 10mg Oxy given sublingual, and patient appears comfortable. No respiratory distress, but breathing at times shallow. No signs of increased phlegm noted. On room air. Mouth swabbing done. Repositioned per comfort.

## 2022-12-04 NOTE — PROGRESS NOTES
[facility]    Medicine Progress Note - Hospitalist Service    Date of Admission:  11/28/2022    Assessment & Plan   Summary of Stay:  Mr. Rucker is a 89 year old male with dementia whose mental status declined rather abruptly just prior to admission.  Found to have an ICH.      ICH, occurred in the setting of apixaban:  -  Family has been considering comfort approach and had wanted to see how the night last night went.  I met with family earlier today and Palliative care met with them.  They at this time as he hasn't continued to worsen would prefer basic non-comfort stroke/hemorrhage care approach at least for a day or two to see how he does, however if he worsens they would favor full move to comfort focus.  Given this, neurology and stroke neuro consulted later today.  Neurosurgery also following and does not feel surgery indicated.  CT imaging stable this AM compared to prior.  -  Continue neuro checks  -  Check bladder scans for retention  -  EEG planned, family don't want MRI.  -  Had been on some dextrose IVF earlier for low glu but given neuro issues will try to avoid using further dextrose fluids and change to 0.45 fluids.  Continue glu monitoring, treat with intermittent glucose if drops glucose.  -  As not really taking oral, will use IV lopressor.  If BP remains high will add vasotec IV to sub for his oral ACEI.   Hydralazine PRN also available.  Goal SBP < 160.  -  Speech consulted, holding on PT/OT given confusion/agitation today    Metabolic encephalopathy exacerbated by above  Dementia:  -  Had some haldol earlier which helped to a degree.   Palliative has started zyprexa scheduled.    PAF  Cardiomyopathy  SSS s/p PPM:  -  DOAC held  - IV Toprol as above    Prostate Cancer :   S/P Combined hormonal therapy and radiotherapy in 2008.  His last hormone injection was in 2010.  Patient's PSA has remained undetectable at <0.01.     Fever:  - discussed with family and they agree  with further work-up, will get CXR, UA, blood work.      Given clinical deterioration, now on comfort cares.               Diet: NPO for Medical/Clinical Reasons Except for: No Exceptions    DVT Prophylaxis: Pneumatic Compression Devices  Rojas Catheter: PRESENT, indication: End of Life  Central Lines: None  Cardiac Monitoring: None  Code Status: No CPR- Do NOT Intubate      Disposition Plan       likely needs placement for hospice.     The patient's care was discussed with the Bedside Nurse.    Xenia Swift MD  Hospitalist Service  [facility]  Securely message with the Vocera Web Console (learn more here)  Text page via Inte[employer] Paging/Directory         Clinically Significant Risk Factors              # Hypoalbuminemia: Lowest albumin = 3 g/dL (Ref range: 3.5-5.2) at 12/1/2022 10:11 AM, will monitor as appropriate                  ______________________________________________________________________    Interval History     + lethargic.    Data reviewed today: I reviewed all medications, new labs and imaging results over the last 24 hours. I personally reviewed no images or EKG's today.    Physical Exam   Vital Signs:                    Weight: 170 lbs 0 oz    Gen - lethargic.  Lungs - Coarse BS.  Heart - RR,S1+S2 nml, no m/g/r.  Abd - soft, NT, ND, + BS.  Ext - no edema.  HEENT - PERRL.    Data   Recent Labs   Lab 12/01/22  1204 12/01/22  1011 12/01/22  0804 11/29/22  2350 11/29/22  0801 11/28/22  0959   WBC  --  12.3*  --   --  8.5 9.2   HGB  --  11.6*  --   --  11.5* 11.3*   MCV  --  97  --   --  98 98   PLT  --  296  --   --  253 262   NA  --  132*  --   --  134* 135*   POTASSIUM  --  4.6  --   --  4.2 4.1   CHLORIDE  --  98  --   --  101 99   CO2  --  18*  --   --  22 24   BUN  --  16.4  --   --  18.4 28.9*   CR  --  1.00  --   --  0.92 1.16   ANIONGAP  --  16*  --   --  11 12   ENEDELIA  --  8.8  --   --  9.0 9.5   * 107* 110*   < > 133* 119*   ALBUMIN  --  3.0*  --   --   --  3.5    PROTTOTAL  --  6.1*  --   --   --  6.8   BILITOTAL  --  0.6  --   --   --  0.4   ALKPHOS  --  49  --   --   --  52   ALT  --  20  --   --   --  14   AST  --  45  --   --   --  21    < > = values in this interval not displayed.     No results found for this or any previous visit (from the past 24 hour(s)).  Medications       - MEDICATION INSTRUCTIONS -   Does not apply See Admin Instructions     glycopyrrolate  0.2 mg Intravenous Q4H     OLANZapine zydis  5 mg Oral At Bedtime     scopolamine  1 patch Transdermal Q72H    And     scopolamine   Transdermal Q8H     sodium chloride (PF)  3 mL Intracatheter Q8H

## 2022-12-04 NOTE — PROGRESS NOTES
Care Management Follow Up    Length of Stay (days): 6    Expected Discharge Date: 12/05/2022     Concerns to be Addressed:   discharge planning     Patient plan of care discussed at interdisciplinary rounds: Yes    Anticipated Discharge Disposition: [facility]     Anticipated Discharge Services:  Hospice     Additional Information:  Spoke with pt's dtr Dilcia regarding preference for hospice services.  Dil[facility]    TEENA called and spoke with Shahla from [facility] who reports they use [facility].  She also reported that Mariann who reviews referrals is not in on weekends, therefore SW would need to f/u on Monday.      SW made a referral to [facility] [phone], answering service for them said they will have someone call TEENA back.    Plan:  TEENA will wait to hear back from [facility] to make a referral and have SW f/u with Marainn on Monday regarding bed availability.       Fernanda DAVID, [facility]  Inpatient Care Coordination   [facility]   [phone]      Addendum:  Dilcia met with TEENA and is also requesting a referral to [facility].  TEENA made referral to [facility] with hospice at discharge     ADDENDUM: Faulk back from [place] Triage Nurse for [facility] she requested SW fax referral to [phone].  She also stated that discharge orders will need to reflect hospice at discharge and will need to be faxed to same fax number as TEENA just listed above.

## 2022-12-05 NOTE — PLAN OF CARE
For vital signs and complete assessments, please see documentation flowsheets.     Pertinent assessments: pt is unresponsive will withdraw from touch.  Noted increase in secretions when Rubinol is due.  Lorazepam 1mg given 2X this shift increased one dose to 2mg when pt became restless.  Meplex on sacrum noted little spot of drainage.  FC in place.  Pt turned once with families permission.  Wife and dtr remain at bedside.  Major Shift Events: none  Treatment Plan: comfort cares, PRN morphine given and scheduled kori  Bedside Nurse:Brittaney Rodriguez RN

## 2022-12-05 NOTE — PLAN OF CARE
Goal Outcome Evaluation:       End of Shift Summary  For vital signs and complete assessments, please see documentation flowsheets.     Pertinent assessments: basically unresponsive, but will withdraw. Taking Robinol for secretions. Did not require Ativan overnight.   Major Shift Events: none  Treatment Plan: comfort cares, PRN morphine given and scheduled robinul  Bedside Nurse:Theresa Finn RN

## 2022-12-05 NOTE — CONSULTS
NUTRITION ASSESSMENT      REASON FOR NUTRITION CONSULT:  LOS.      ASSESSMENT:  Per review of chart and discussion with team during rounds, patient is comfort cares.      FOLLOW UP:   Will not follow per policy.  Please page or consult if needs arise.      Anne Rider RDN, LD  Clinical Dietitian  3rd floor/ICU: [phone]  All other floors: [phone]  Weekend/holiday: [phone]  Office: [phone]

## 2022-12-05 NOTE — PROGRESS NOTES
Care Management Follow Up    Length of Stay (days): 7    Expected Discharge Date: 12/05/2022 - once placement is found     Concerns to be Addressed: discharge planning  Patient plan of care discussed at interdisciplinary rounds: Yes    Anticipated Discharge Disposition: Hospice     Anticipated Discharge Services:  none  Anticipated Discharge DME:  TBD    Patient/family educated on Medicare website which has current facility and service quality ratings:  no  Education Provided on the Discharge Plan:  yes  Patient/Family in Agreement with the Plan: yes    Referrals Placed by CM/SW:  Post acute facilities and hospice  Private pay costs discussed: private room/amenity fees and transportation costs    Additional Information:  Kayla spoke with Meaghan at [facility] and sent her the pt's initial referral for review P: [phone] F: [phone].    Kayla left a vm for Mariann, admissions for [facility] P: [phone], to see if they have any beds available.    Sw will continue with discharge planning and will be available as needed until discharge.      JOANN Gordillo, [facility]  Inpatient Care Coordination  [facility]  [phone]    ADDENDUM 1617:  Kayla was updated by Mariann, that they will not have a bed available until Friday at the earliest.  Kayla spoke with Melva at [facility], [phone], who said that they might have a bed available on Thursday or Friday.  [facility] is currently unavailable for admissions.  Kayla reached out to [facility] who said that they do not have a bed available until maybe next week.    Kayla provided the pt's family, wife, dtr Dilcia, and son Cornell, with a list of LTC facilities and hospice facilities.  They will review the list and update Kayla.    Kayla provided the

## 2022-12-06 NOTE — PROGRESS NOTES
Pt unresponsive, but withdraw from pain. PRN IV Morphine, given for pain. PRN  Robinol, given for secretion. Oral care given. PIV SL. Family at bedside

## 2022-12-06 NOTE — PLAN OF CARE
Pertinent assessments: Pt unresponsive, but will withdraw. On 4L NC. Ax2 with lift. Rojas in place. Incontinent of bowel. No pain noted this shift.Taking Robinul for secretions.   Major Shift Events: none  Treatment Plan: comfort cares, PRN morphine, Zyprexa, and scheduled robinul  Bedside Nurse:Nita Phan RN

## 2022-12-06 NOTE — PROGRESS NOTES
[facility]  Palliative Care Progress Note  Text Page     Assessment & Plan   Recommendations:  1. Goals of Care- No CPR- Do NOT Intubate  Hospitalization goals discussed with patient, spouse Shahla and daughter Dilcia.  Comfort measures.  Planning disposition to facility on hospice, will continue to monitor for stability for safe transfer when placement is established.  Decisional Capacity- Unreliable. Patient does not have an advance directive. Per  informed consent policy next of kin should be involved in all consent and decision making.  POLST none on file, recommend completion prior to discharge   - comfort measures order set placed.    2. Encephalopathy, with behavioral component  Baseline confusion due to dementia, now likely exacerbated by intracranial hemorrhage. Lethargic without agitated behaviors overnight.   - comfort focused cares.  PRN anxiolytics per order set.     3. Poor PO intake  Patient is not safe for PO intake due to encephalopathy.  NPO due to concern for aspiration/dysphagia  - NPO per primary service.    - ODT tablets, IV formulations of medications reasonable.  - comfort measures.     4. Generalized weakness  Patient with altered mental status and unable to mobilize due to lethargy and ataxic movements.  - bedrest, assess for readiness to mobilize with therapy once patient is able     5. Impaired ability to clear airway of secretions  Patient with active 'rattling' on assessment, wet secretions in posterior pharynx. Nasal trumpet placed by nursing team to promote suction clearance.  - comfort cares  - secretion management medications per comfort order set    6. Pain  Patient with grimace and moaning with repositioning.  PRN IV morphine provided with limited relief.  Family notes 'cycle' of increased indicators of pain.    - morphine 7.5 mg crushed tablet buccally every 4 hours scheduled   - morphine IV 1-2 mg every 1 hour prn    7. Spiritual Care  Oriented to  Spiritual Health as part of Palliative Care team. Appreciate Care of [place]  Spiritual Background: Anabaptism  [facility]'s  from [facility] was at the bedside on 11/28 and provided anointment per spouse/daughter's report.     8. Care Planning  Appreciate Care of Interdisciplinary team.  Disposition plan pending.     Medical Decision Making and Goals of Care:  Discussed on December 6, 2022 with JENNY Fowler CNP:   Met with Leeann at the bedside.  Genaro is present, sleeping in bed, mild tachypnea.  Reviewed plan of care and POLST form.  Form completed to indicate DNR and Comfort focused cares.  Shahla and Dilcia indicated discomfort with the discharge planning.  Dilcia inquired what could be done to ensure comfort if he were to need discharge to a facility.  She also inquired if discharge is ever held if a patient appears to be actively dying.  Discussed 'parallel processes' of ongoing assessments of symptoms and signs of impending death and disposition to facility.  Discussed the ability to hold a discharge if a patient's status changes and discharge from the hospital would prove more harmful than beneficial.  They verbalized understanding.  Discussed symptom management and heard their concerns about intermittent signs of pain that Genaro currently exhibits.  Offered plan to schedule SL/Buccal morphine for more consistent coverage and they agreed to this plan.    Listened to their processing and emotions regarding the uncertainties of a person's death process.  Offered supportive listening and empathy.     JENNY Fowler CNP  Pain Management and Palliative Care  [facility]  Pgr: [phone]      Time Spent on this Encounter   Total unit/floor time 35 minutes , time consisted of the following, examination of the patient, reviewing the record and completing documentation. >50% of time spent in counseling and coordination of care, Bedside Nurse Sherine  and Hospitalist Jamison.  Time spend counseling with patient and family consisted of the following topics, symptom management.      Review of Systems   CONSTITUTIONAL: NEGATIVE for fever, chills  ENT/MOUTH: NEGATIVE for ear, mouth and throat problems  RESP: NEGATIVE for significant cough or SOB  CV: NEGATIVE for chest pain, palpitations or peripheral edema    Physical Exam   Resp:  [24-26] 26  SpO2:  [87 %-90 %] 90 %  170 lbs 0 oz  Exam:  GEN:  Lethargic, brief eye opening to voice, appears comfortable, NAD.  HEENT:  Normocephalic/atraumatic, no scleral icterus, no nasal discharge, mouth moist.  RESP:  Symmetric chest rise on inhalation noted.  Tachypnea.  ABD:  Rounded, soft, non-tender/non-distended.  +BS  EXT:  Edema & pulses as noted above.  CMS intact x 4.     SKIN:  Dry to touch, no exanthems noted in the visualized areas.    NEURO: Lethargic, no focal deficits  Psych:  RADHA    Medications       - MEDICATION INSTRUCTIONS -   Does not apply See Admin Instructions     glycopyrrolate  0.2 mg Intravenous Q4H     OLANZapine zydis  5 mg Oral At Bedtime     scopolamine  1 patch Transdermal Q72H    And     scopolamine   Transdermal Q8H     sodium chloride (PF)  3 mL Intracatheter Q8H       Data   No results found for this or any previous visit (from the past 24 hour(s)).

## 2022-12-06 NOTE — PROGRESS NOTES
Care Management Follow Up    Length of Stay (days): 8    Expected Discharge Date: 12/07/2022 - once placement is found     Concerns to be Addressed: discharge planning  Patient plan of care discussed at interdisciplinary rounds: Yes    Anticipated Discharge Disposition: Hospice     Anticipated Discharge Services:  none  Anticipated Discharge DME:  Hospital bed    Patient/family educated on Medicare website which has current facility and service quality ratings:  yes  Education Provided on the Discharge Plan:  yes  Patient/Family in Agreement with the Plan: yes    Referrals Placed by CM/SW:  Post acute facilities, hospice  Private pay costs discussed: private room/amenity fees and transportation costs    Additional Information:  [facility] LTC referral is still pending.  [facility] does not have any beds until possibly the end of the week.  [facility] does not have any beds available.  Sw met with the pt's wife and dtr to provide an update.  They said additional referrals can be sent to [facility], [facility], and [facility].    Sw will continue with discharge planning and will be available as needed until discharge.  Sw addressed all of their questions and concerns.      JOANN Gordillo, [facility]  Inpatient Care Coordination  [facility]  [phone]

## 2022-12-07 NOTE — PLAN OF CARE
Goal Outcome Evaluation:  Pertinent assessments: Assumed care of pt from 0155-8415. Not responsive, comfort care. Pt's family refusing major repositioning, unable to assess buttocks wound. NPO, pain medications given as scheduled. O2 and pulse oximetry removed.    Major Shift Events: Pain medications changed from IV to PO  Treatment Plan: hospice placement, comfort cares

## 2022-12-07 NOTE — PROGRESS NOTES
Hospitalist Medicine Progress Note   [facility]       Jose Rucker is a 89 year old gentleman with history of prostate cancer, sick sinus syndrome s/p permanent pacemaker, cardiomyopathy, dementia whose mental status declined abruptly just prior to admission to [facility] 11/28/2022 Further evaluation revealing intracranial hemorrhage in the setting of apixaban therapy for paroxysmal atrial fibrillation.   Family decided for him to be hospice care with comfort focus and patient is awaiting placement in a hospice facility.   is assisting the patient's family and deciding discharge destination.        Date of Admission:  11/28/2022  Assessment & Plan     left temporal lobe intraparenchymal hemorrhage   Obtundation due to ICH   Dementia  Prostate cancer  Sick sinus syndrome  Cardiomyopathy            Plan:   Awaiting hospice placement    Diet: NPO for Medical/Clinical Reasons Except for: No Exceptions    DVT Prophylaxis: Hospice patient therefore no DVT prophylaxis  Rojas Catheter: PRESENT, indication: End of Life  Code Status: No CPR- Do NOT Intubate               The patient's care was discussed with the Patient's wife and daughter along with niece.    Michele Elliott MD  Hospitalist Service  [facility]    ______________________________________________________________________    Interval History     Symptoms   Patient looks comfortable not interacting    Review of Systems:   Above  Data reviewed today: I reviewed all medications, new labs and imaging results over the last 24 hours.     Physical Exam   Vital Signs:                    Weight: 170 lbs 0 oz      GENERAL: Patient is not  in acute distress  NECK: no Jugular Venous distention  HEART: S1 S2 regular Rate and Rhythm, there is  no murmur,   LUNGS: Respirations are  not laboured, Lungs are  clear to auscultation without Crepitations or Wheezing   ABDOMEN: Soft , there is no tenderness  ,Bowel Sounds are Positive   LOWER LIMBS: no Pedal Edema  Bilaterally   CNS: Patient is sleepy obtunded    Data   Recent Labs   Lab 12/01/22  1204 12/01/22  1011 12/01/22  0804   WBC  --  12.3*  --    HGB  --  11.6*  --    MCV  --  97  --    PLT  --  296  --    NA  --  132*  --    POTASSIUM  --  4.6  --    CHLORIDE  --  98  --    CO2  --  18*  --    BUN  --  16.4  --    CR  --  1.00  --    ANIONGAP  --  16*  --    ENEDELIA  --  8.8  --    * 107* 110*   ALBUMIN  --  3.0*  --    PROTTOTAL  --  6.1*  --    BILITOTAL  --  0.6  --    ALKPHOS  --  49  --    ALT  --  20  --    AST  --  45  --          No results found for this or any previous visit (from the past 24 hour(s)).

## 2022-12-07 NOTE — PROGRESS NOTES
SPIRITUAL HEALTH SERVICES Progress Note  [facility] Med/Surg 5    Saw pt Genaro and his family for an emotional support check-in.  Genaro was resting, and his spouse Shahla and daughter Diclia were at the bedside.      They welcomed prayer and engaged in memory sharing.  Shahla reflected on Genaro's personality and shared that he led a very active life with hunting, fishing, gardening, and baking.  Dilcia shared memories of spending time at their family cabin in the summer.      Shahla reported that they have a son Db who lives locally and sees Genaro in the evenings.      Plan: Reviewed with pt's family how they can request further  support.  This author and other chaplains remain available per amily request.    Jasmeet Rondon M.Div., [facility]  Staff   Phone [phone]

## 2022-12-07 NOTE — PLAN OF CARE
Lethargic. Scheduled med given for comfort. Repositioned/weight shifted for comfort. Family at bedside. Rojas in place.

## 2022-12-08 NOTE — PLAN OF CARE
Goal Outcome Evaluation:  Pertinent assessments: Assumed care of pt from [id number]. NPO. Comfort cares, scheduled medications given.    Major Shift Events: uneventful  Treatment Plan: waiting for placement, scheduled and PRN medications

## 2022-12-08 NOTE — PROGRESS NOTES
[facility] Nurse Inpatient Assessment     Consulted for: Coccyx    Patient now on comfort cares.  RN reports changing dressing this morning.  Per RN patients family requests not to reposition more than neccessary.  RN reports no change needed to current plan of care at this time.  Orders in place will follow peripherally in case new need arises.  Previous assessment listed below.     Patient History (according to provider note(s):      Mr. Rucker is a 89 year old male with dementia whose mental status declined rather abruptly just prior to admission.  Found to have an ICH.      Areas Assessed:      Areas visualized during today's visit: Sacrum/coccyx    Pressure Injury Location: Right buttock (near coccyx)  Last photo: none  Wound type: Pressure Injury     Pressure Injury Stage: 2, present on admission   Wound history/plan of care:   Family reports wound started a couple weeks ago, has been using barrier cream to site.  Wound base: 100 % dermis, nearly healed     Palpation of the wound bed: normal      Drainage: scant     Description of drainage: serous     Measurements (length x width x depth, in cm) 0.8  x 0.3  x  0.1 cm      Tunneling N/A     Undermining N/A  Periwound skin: Intact      Color: normal and consistent with surrounding tissue      Temperature: normal   Odor: none  Pain: no grimacing or signs of discomfort  Pain intervention prior to dressing change: N/A  Treatment goal: Heal   STATUS: initial assessment  Supplies ordered: supplies stored on unit and discussed with RN       Treatment Plan:     Coccyx/buttock wound(s): Every 3 days   1. Cleanse with wound cleanser and dry  2. Apply Mepilex sacral  3. Sidelying in bed, reposition Q2hrs in bed and Q1hr in chair     Orders: Written    RECOMMEND PRIMARY TEAM ORDER: None, at this time  Education provided: importance of repositioning, plan of care and Off-loading pressure  Discussed plan of care with: Patient, Family and  Nurse  WOC nurse follow-up plan: weekly  Notify WOC if wound(s) deteriorate.  Nursing to notify the Provider(s) and re-consult the WOC Nurse if new skin concern.    DATA:     Current support surface: Standard  Standard gel/foam mattress (IsoFlex, Atmos air, etc)  Containment of urine/stool: Incontinence Protocol and Indwelling catheter  BMI: Body mass index is 24.05 kg/m .   Active diet order: Orders Placed This Encounter      NPO for Medical/Clinical Reasons Except for: No Exceptions     Output: I/O last 3 completed shifts:  In: -   Out: 250 [Urine:250]     Labs: No lab results found in last 7 days.    Invalid input(s): GLUCOMBO  Pressure injury risk assessment:   Sensory Perception: 2-->very limited  Moisture: 3-->occasionally moist  Activity: 1-->bedfast  Mobility: 1-->completely immobile  Nutrition: 1-->very poor  Friction and Shear: 1-->problem  Everardo Score: 9    Ant Sanchez RN CWOCN  Dept. Pager: [phone]  Dept. Office Number: [phone]

## 2022-12-08 NOTE — PLAN OF CARE
Goal Outcome Evaluation:  End of Shift Summary  For vital signs and complete assessments, please see documentation flowsheets.     Pertinent assessments: Minimally responsive. Comfort cares maintained. Weight shifting with assist. Mouth care provided.     Major Shift Events: uneventful  Treatment Plan: waiting for placement, scheduled and PRN medications  Bedside Nurse: Theresa Finn RN

## 2022-12-08 NOTE — PROGRESS NOTES
Hospitalist Medicine Progress Note   [facility]       Jose Rucker is a 89 year old gentleman with history of prostate cancer, sick sinus syndrome s/p permanent pacemaker, cardiomyopathy, dementia whose mental status declined abruptly just prior to admission to [facility] 11/28/2022 Further evaluation revealing intracranial hemorrhage in the setting of apixaban therapy for paroxysmal atrial fibrillation.   Family decided for him to be hospice care with comfort focus and patient is awaiting placement in a hospice facility.   is assisting the patient's family and deciding discharge destination.        Date of Admission:  11/28/2022  Assessment & Plan     left temporal lobe intraparenchymal hemorrhage   Obtundation due to ICH   Dementia  Prostate cancer  Sick sinus syndrome  Cardiomyopathy            Plan:   Awaiting hospice placement  Medication changed to oral    Diet: NPO for Medical/Clinical Reasons Except for: No Exceptions    DVT Prophylaxis: Hospice patient therefore no DVT prophylaxis  Rojas Catheter: PRESENT, indication: End of Life  Code Status: No CPR- Do NOT Intubate               The patient's care was discussed with the Patient's wife and daughter along with niece.    Michele Elliott MD  Hospitalist Service  [facility]    ______________________________________________________________________    Interval History     Symptoms   Patient looks comfortable not interacting    Review of Systems:   Above  Data reviewed today: I reviewed all medications, new labs and imaging results over the last 24 hours.     Physical Exam   Vital Signs:                    Weight: 170 lbs 0 oz      GENERAL: Patient is not  in acute distress but looks tachypneic  NECK: no Jugular Venous distention  HEART: S1 S2 regular Rate and Rhythm, there is  no murmur,   LUNGS: Respirations are  not laboured but they are tachypneic, Lungs are  clear to auscultation  without Crepitations or Wheezing   ABDOMEN: Soft , there is no tenderness ,Bowel Sounds are Positive   LOWER LIMBS: no Pedal Edema  Bilaterally   CNS: Patient is sleepy obtunded    Data   No lab results found in last 7 days.      No results found for this or any previous visit (from the past 24 hour(s)).

## 2022-12-08 NOTE — PROGRESS NOTES
[facility]  Palliative Care Progress Note  Text Page     Assessment & Plan   Recommendations:  1. Goals of Care- No CPR- Do NOT Intubate  Hospitalization goals discussed Discussed symptoms and medications, offered support to family   Decisional Capacity- Unreliable. Patient does not have an advance directive. Per  informed consent policy next of kin should be involved in all consent and decision making.  POLST none on file, recommend completion prior to discharge   - comfort measures order set placed.     2. Encephalopathy, with behavioral component  Baseline confusion due to dementia, now likely exacerbated by intracranial hemorrhage. Lethargic without agitated behaviors overnight.   - comfort focused cares.  PRN anxiolytics per order set.     3. Poor PO intake  Patient is not safe for PO intake due to encephalopathy.  NPO due to concern for aspiration/dysphagia  - NPO per primary service.    - ODT tablets, IV formulations of medications reasonable.  - comfort measures.     4. Generalized weakness  Patient with altered mental status and unable to mobilize due to lethargy and ataxic movements.  - bedrest, assess for readiness to mobilize with therapy once patient is able     5. Impaired ability to clear airway of secretions  Patient with active 'rattling' on assessment, wet secretions in posterior pharynx. Nasal trumpet placed by nursing team to promote suction clearance.  - comfort cares  - secretion management medications per comfort order set     6. Pain  Patient with grimace and moaning with repositioning.  PRN IV morphine provided with limited relief.  Family notes 'cycle' of increased indicators of pain.    - morphine increased to 10 mg every 4 hours due to grimcing with turning and cares  - morphine IV 1-2 mg every 1 hour prn     7. Spiritual Care  Oriented to Spiritual Health as part of Palliative Care team. Appreciate Care of [place]  Spiritual  Background: [employer]  Patient's  from [facility] was at the bedside on 11/28 and provided anointment per spouse/daughter's report.     8. Care Planning  Appreciate Care of Interdisciplinary team.  Disposition plan pending.    Medical Decision Making and Goals of Care:  Discussed on December 8, 2022 with Zainab Khan NP: Met with patients wife and daughter at the bedside. Discussed and reviewed the last 24 hours. Discussed and reviewed symptoms and medications. They are worried that he is uncomfortable with turning and cares and have asked to have him not turned. We discussed keeping him turned side to side to prevent muscle stiffness and skin breakdown. They verbalized understanding. medication changes made for comfort    Zainab Khan NP  Pain Management and Palliative Care  [facility]  Pgr: [phone]    Time Spent on this Encounter   Total unit/floor time 27 minutes, time consisted of the following, examination of the patient, reviewing the record and completing documentation. >50% of time spent in counseling and coordination of care, Bedside Nurse Ceci and Hospitalist Dr stern.  Time spend counseling with family consisted of the following topics, care planning for discharge and symptom management.         Review of Systems   unresponsive    Physical Exam      170 lbs 0 oz  Exam:  GENERAL APPEARANCE:  minimally responsive  RESP:  no respiratory distress  CV:  Palpation and auscultation of heart done , regular rate and rhythm, no murmur, rub, or gallop  ABDOMEN:  normal bowel sounds, soft, nontender, no hepatosplenomegaly or other masses  PSYCH:  minimally responsive    Medications       - MEDICATION INSTRUCTIONS -   Does not apply See Admin Instructions     glycopyrrolate  0.2 mg Intravenous Q4H     morphine  7.5 mg Oral Q4H     OLANZapine zydis  5 mg Oral At Bedtime     scopolamine  1 patch Transdermal Q72H    And     scopolamine   Transdermal Q8H     sodium chloride (PF)   3 mL Intracatheter Q8H       Data   No results found for this or any previous visit (from the past 24 hour(s)).

## 2022-12-09 NOTE — PLAN OF CARE
Pertinent assessments: Minimally responsive. Comfort cares maintained. FC Weight shifting with assist. Mouth care provided. Palliative in to see pt/family po med of MS switched to sl liq Robinul stopped and atropine sched.      Major Shift Events: uneventful  Treatment Plan: waiting for placement, scheduled and PRN medications  Bedside Nurse: Brittaney Rodriguez RN

## 2022-12-09 NOTE — DEATH PRONOUNCEMENT
MD DEATH PRONOUNCEMENT    Called to pronounce Jose Rucker dead.    Physical Exam: Unresponsive to noxious stimuli, Spontaneous respirations absent, Breath sounds absent, Carotid pulse absent, Heart sounds absent, Pupillary light reflex absent and Corneal blink reflex absent    Patient was pronounced dead at 01:00AM, 2022.    No data filed       Principal Problem:    Intraparenchymal hemorrhage of brain (H)  Active Problems:    Altered mental status, unspecified altered mental status type       Infectious disease present?: NO    Communicable disease present? (examples: HIV, chicken pox, TB, Ebola, CJD) :  NO    Multi-drug resistant organism present? (example: MRSA): NO    Please consider an autopsy if any of the following exist:  NO Unexpected or unexplained death during or following any dental, medical, or surgical diagnostic treatment procedures.   NO Death of mother at or up to seven days after delivery.     NO All  and pediatric deaths.     NO Death where the cause is sufficiently obscure to delay completion of the death certificate.   NO Deaths in which autopsy would confirm a suspected illness/condition that would affect surviving family members or recipients of transplanted organs.     The following deaths must be reported to the 's Office:  NO A death that may be due entirely or in part to any factors other than natural disease (recent surgery, recent trauma, suspected abuse/neglect).   NO A death that may be an accident, suicide, or homicide.     NO Any sudden, unexpected death in which there is no prior history of significant heart disease or any other condition associated with sudden death.   NO A death under suspicious, unusual, or unexpected circumstances.    NO Any death which is apparently due to natural causes but in which the  does not have a personal physician familiar with the patient s medical history, social, or environmental situation or the  circumstances of the terminal event.   NO Any death apparently due to Sudden Infant Death Syndrome.     NO Deaths that occur during, in association with, or as consequences of a diagnostic, therapeutic, or anesthetic procedure.   NO Any death in which a fracture of a major bone has occurred within the past (6) six months.   NO A death of persons note seen by their physician within 120 days of demise.     NO Any death in which the  was an inmate of a public institution or was in the custody of Law Enforcement personnel.   YES  All unexpected deaths of children   unknown Solid organ donors   NO Unidentified bodies   unknown Deaths of persons whose bodies are to be cremated or otherwise disposed of so that the bodies will later be unavailable for examination;   NO Deaths unattended by a physician outside of a licensed healthcare facility or licensed residential hospice program   NO Deaths occurring within 24 hours of arrival to a health care facility if death is unexpected.    NO Deaths associated with the decedent s employment.   NO Deaths attributed to acts of terrorism.   NO Any death in which there is uncertainty as to whether it is a medical examiner s care should be discussed with the medical investigator.        Body disposition: Defer to family

## 2022-12-20 ENCOUNTER — DOCUMENTATION ONLY (OUTPATIENT)
Dept: CARDIOLOGY | Facility: CLINIC | Age: 87
End: 2022-12-20

## 2024-12-06 NOTE — DISCHARGE SUMMARY
[facility]  Hospitalist Discharge Summary      Date of Admission:  2022  Date and Time of Death:  2022  01:00 AM  Discharging Provider: Michele Elliott MD  Discharge Service: Hospitalist Service    Discharge Diagnoses   left temporal lobe intraparenchymal hemorrhage   Obtundation due to ICH   Dementia  Prostate cancer  Sick sinus syndrome  Cardiomyopathy  Pressure injury Stage 2  Right Buttock          Hospital Course   Jose Rucker is a 89 year old gentleman with history of prostate cancer, sick sinus syndrome s/p permanent pacemaker, cardiomyopathy, dementia whose mental status declined abruptly just prior to admission to [facility] 2022 Further evaluation revealing intracranial hemorrhage in the medial left temporal  Lobe in the setting of apixaban therapy for paroxysmal atrial fibrillation.   Family decided for him to be hospice care with comfort focus. This patient was awaiting placement in a hospice facility. He was comfortable and passed away () at 0100 hrs of 2022    Consultations This Hospital Stay   CARE MANAGEMENT / SOCIAL WORK IP CONSULT  SPIRITUAL HEALTH SERVICES IP CONSULT  PALLIATIVE CARE ADULT IP CONSULT  NEUROLOGY IP CONSULT  SPEECH LANGUAGE PATH ADULT IP CONSULT  NEUROLOGY IP STROKE CONSULT  WOUND OSTOMY CONTINENCE NURSE  IP CONSULT    Code Status   DNR    Time Spent on this Encounter   I, Michele Elliott MD, personally saw the patient today and spent less than or equal to 30 minutes discharging this patient.       Michele Elliott MD  [facility] MEDICAL SURGICAL  [address] [place] [address]  Phone: [phone]  Fax: [phone]  ______________________________________________________________________     Primary Care Physician   Physician No Ref-Primary      CT scan of the head without contrast done 2022 showed  1. Small volume acute intraventricular extension of hemorrhage  layering in the  posterior left atrium. This is accompanied by an 8 mm  focus of acute intraparenchymal hemorrhage in the medial left temporal  lobe without adjacent surrounding hypoattenuating vasogenic edema. As  the cause of this medial left temporal lobe intraparenchymal  hemorrhage is indeterminate, and the paucity of surrounding vasogenic  edema is somewhat atypical, contrast enhanced brain MRI is recommended  to evaluate for a possible underlying cause, to include an underlying  vascular or mass lesion, as well as the possibility of cerebral  amyloid angiopathy.  2.  Moderate to severe burden chronic small vessel ischemic changes  with a moderate diffuse parenchymal volume loss.    CT scan of the head done on 11/28/2022 without contrast showed  1.  Small amount of intraventricular blood products at the left occipital horn. Suspected source includes 1 cm foraminal hematoma at the medial left temporal lobe.  2.  Cerebral volume loss and presumed chronic small vessel ischemic disease.    Michele Elliott MD       No

## (undated) DEVICE — CANISTER WOUND VAC W/GEL 500ML M8275063/5

## (undated) DEVICE — GLOVE PROTEXIS W/NEU-THERA 7.5  2D73TE75

## (undated) DEVICE — SOL WOUND IRRIG PRONTOSAN 350 ML BOTTLE 400441

## (undated) DEVICE — DRAPE EXTREMITY W/ARMBOARD 29405

## (undated) DEVICE — PACK MINOR SBA15MIFSE

## (undated) DEVICE — SOL WATER IRRIG 1000ML BOTTLE 2F7114

## (undated) DEVICE — Device

## (undated) DEVICE — CLEANSER WOUND DEBRIDEMENT VASHE 250ML 00313

## (undated) DEVICE — ESU GROUND PAD UNIVERSAL W/O CORD

## (undated) DEVICE — DRSG ACTICOAT 7 4X5" 20141

## (undated) DEVICE — BLADE KNIFE SURG 10 371110

## (undated) DEVICE — LINEN TOWEL PACK X5 5464

## (undated) DEVICE — SOL NACL 0.9% INJ 250ML BAG 2B1322Q

## (undated) DEVICE — MANIFOLD NEPTUNE 4 PORT 700-20

## (undated) DEVICE — DRSG WOUND VAC GRANUFOAM SILVER SM

## (undated) DEVICE — SOL NACL 0.9% IRRIG 1000ML BOTTLE 2F7124

## (undated) DEVICE — PREP SKIN SCRUB TRAY 4461A

## (undated) DEVICE — SUCTION TIP YANKAUER W/O VENT K86

## (undated) DEVICE — SYR BULB IRRIG 50ML LATEX FREE 0035280

## (undated) RX ORDER — FENTANYL CITRATE 50 UG/ML
INJECTION, SOLUTION INTRAMUSCULAR; INTRAVENOUS
Status: DISPENSED
Start: 2020-07-16

## (undated) RX ORDER — PROPOFOL 10 MG/ML
INJECTION, EMULSION INTRAVENOUS
Status: DISPENSED
Start: 2020-07-16

## (undated) RX ORDER — CEFAZOLIN SODIUM 2 G/100ML
INJECTION, SOLUTION INTRAVENOUS
Status: DISPENSED
Start: 2020-07-16

## (undated) RX ORDER — ONDANSETRON 2 MG/ML
INJECTION INTRAMUSCULAR; INTRAVENOUS
Status: DISPENSED
Start: 2020-07-16